# Patient Record
Sex: FEMALE | Race: WHITE | NOT HISPANIC OR LATINO | Employment: UNEMPLOYED
[De-identification: names, ages, dates, MRNs, and addresses within clinical notes are randomized per-mention and may not be internally consistent; named-entity substitution may affect disease eponyms.]

---

## 2017-07-01 ENCOUNTER — HEALTH MAINTENANCE LETTER (OUTPATIENT)
Age: 57
End: 2017-07-01

## 2017-10-06 DIAGNOSIS — J45.909 ASTHMA: Primary | ICD-10-CM

## 2017-12-01 NOTE — TELEPHONE ENCOUNTER
APPT INFO    Date /Time: 12/04/17   Reason for Appt: Asthma   Ref Provider/Clinic: Self   Are there internal records? If yes, list: No   Patient Contact (Y/N) & Call Details: Yes  New issue; hasn't been seen anywhere for this before.  Hasn't been seen by any doctor for over a year.   Action: New Issue; no related records to be gathered.  No chest imaging or PFT has been done before; never been seen by pulm specialist.     OUTSIDE RECORDS CHECKLIST     CLINIC NAME COMMENTS REC (x) IMG (x)

## 2017-12-04 ENCOUNTER — HOSPITAL ENCOUNTER (OUTPATIENT)
Dept: CARDIOLOGY | Facility: CLINIC | Age: 57
Discharge: HOME | End: 2017-12-04
Attending: INTERNAL MEDICINE | Admitting: INTERNAL MEDICINE
Payer: MEDICARE

## 2017-12-04 ENCOUNTER — PRE VISIT (OUTPATIENT)
Dept: PULMONOLOGY | Facility: CLINIC | Age: 57
End: 2017-12-04

## 2017-12-04 ENCOUNTER — OFFICE VISIT (OUTPATIENT)
Dept: PULMONOLOGY | Facility: CLINIC | Age: 57
End: 2017-12-04
Attending: INTERNAL MEDICINE
Payer: MEDICARE

## 2017-12-04 VITALS
DIASTOLIC BLOOD PRESSURE: 81 MMHG | SYSTOLIC BLOOD PRESSURE: 111 MMHG | BODY MASS INDEX: 27.31 KG/M2 | OXYGEN SATURATION: 94 % | HEIGHT: 64 IN | RESPIRATION RATE: 18 BRPM | WEIGHT: 160 LBS | HEART RATE: 97 BPM

## 2017-12-04 DIAGNOSIS — J45.909 ASTHMA: Primary | ICD-10-CM

## 2017-12-04 DIAGNOSIS — R06.09 DYSPNEA ON EXERTION: ICD-10-CM

## 2017-12-04 DIAGNOSIS — R06.09 DYSPNEA ON EXERTION: Primary | ICD-10-CM

## 2017-12-04 LAB
BASOPHILS # BLD AUTO: 0.1 10E9/L (ref 0–0.2)
BASOPHILS NFR BLD AUTO: 0.7 %
DIFFERENTIAL METHOD BLD: NORMAL
EOSINOPHIL # BLD AUTO: 0.1 10E9/L (ref 0–0.7)
EOSINOPHIL NFR BLD AUTO: 0.7 %
ERYTHROCYTE [DISTWIDTH] IN BLOOD BY AUTOMATED COUNT: 13.1 % (ref 10–15)
HCT VFR BLD AUTO: 42.5 % (ref 35–47)
HGB BLD-MCNC: 13.8 G/DL (ref 11.7–15.7)
IMM GRANULOCYTES # BLD: 0 10E9/L (ref 0–0.4)
IMM GRANULOCYTES NFR BLD: 0.2 %
LYMPHOCYTES # BLD AUTO: 3.8 10E9/L (ref 0.8–5.3)
LYMPHOCYTES NFR BLD AUTO: 43.8 %
MCH RBC QN AUTO: 32.2 PG (ref 26.5–33)
MCHC RBC AUTO-ENTMCNC: 32.5 G/DL (ref 31.5–36.5)
MCV RBC AUTO: 99 FL (ref 78–100)
MONOCYTES # BLD AUTO: 0.6 10E9/L (ref 0–1.3)
MONOCYTES NFR BLD AUTO: 6.5 %
NEUTROPHILS # BLD AUTO: 4.1 10E9/L (ref 1.6–8.3)
NEUTROPHILS NFR BLD AUTO: 48.1 %
NRBC # BLD AUTO: 0 10*3/UL
NRBC BLD AUTO-RTO: 0 /100
PLATELET # BLD AUTO: 267 10E9/L (ref 150–450)
RBC # BLD AUTO: 4.28 10E12/L (ref 3.8–5.2)
WBC # BLD AUTO: 8.6 10E9/L (ref 4–11)

## 2017-12-04 PROCEDURE — 99212 OFFICE O/P EST SF 10 MIN: CPT | Mod: ZF

## 2017-12-04 PROCEDURE — 93005 ELECTROCARDIOGRAM TRACING: CPT

## 2017-12-04 PROCEDURE — 93306 TTE W/DOPPLER COMPLETE: CPT

## 2017-12-04 PROCEDURE — 82785 ASSAY OF IGE: CPT | Performed by: INTERNAL MEDICINE

## 2017-12-04 PROCEDURE — 36415 COLL VENOUS BLD VENIPUNCTURE: CPT | Performed by: INTERNAL MEDICINE

## 2017-12-04 PROCEDURE — 85025 COMPLETE CBC W/AUTO DIFF WBC: CPT | Performed by: INTERNAL MEDICINE

## 2017-12-04 PROCEDURE — 93306 TTE W/DOPPLER COMPLETE: CPT | Mod: 26 | Performed by: INTERNAL MEDICINE

## 2017-12-04 RX ORDER — DESONIDE 0.5 MG/G
CREAM TOPICAL 2 TIMES DAILY
COMMUNITY

## 2017-12-04 ASSESSMENT — PAIN SCALES - GENERAL: PAINLEVEL: NO PAIN (0)

## 2017-12-04 NOTE — LETTER
12/4/2017       RE: Dalton Kumar  [address]     [place] [address]     Dear Colleague,    Thank you for referring your patient, Dalton Kumar, to the [facility] at [facility]. Please see a copy of my visit note below.    CHIEF COMPLAINT:  Shortness of breath.      HISTORY OF PRESENT ILLNESS:  The patient is a 57-year-old woman here for evaluation of shortness of breath.  She says it has been present or worse for about the last 2 years.  She says that she notices it most prominently when she picks up her cat or when she is going to the [facility].  She says that her shortness of breath is noted as a sensation of breathing hard.  She says that symptoms occur occasionally, but very consistently with the above-mentioned activities.  She says that she feels tired, and that her breathing is somewhat difficult, but she would not describe it as wheezing or tight.  She says that whenever she has the sensation, she tries to stay calm and relax, and the sensation goes away after a few minutes.  She says that she is not having any problems waking up at night short of breath.  She does have heartburn, but does not necessarily have chest pain.  She does not have any problems with dry eyes or dry mouth.  She does say that her eyes have been watering more frequently, especially in the winter and she attributes the watering to allergies.  She does not have any problems with joint issues. She has noted ankle swelling in her left ankle.  She says she tripped a few years ago, and has had increased in size in that left ankle since then.  She has also noticed a lump on the medial portion of her right gastroc.  She says that this has been present for about 2 months.  It is somewhat tender when she presses on it.  She has not had any injury to that leg.  She has not had any problem with cough, although she does have problems with a dry  nose.  No muscle weakness.  She has no chest pain or palpitations.  She has never had a heart evaluation.  She recently changed her primary care to the [facility] with [facility].  She had an evaluation last approximately 1 year ago.  She says that although she has been complaining of shortness of breath, this is the first time she has seen a specialist for it.  She does have a previous diagnosis of asthma from a previous primary care provider.  She has albuterol inhaler that she uses occasionally.  She has never tried using her albuterol inhaler for these current episodes of shortness of breath. She says that she used to use the albuterol inhaler before going for a 1 mile walk around her block, but hasn't done that type of walking for several years. She did walk a half a mile to [employer] this weekend.      PAST MEDICAL HISTORY:   1.  Hypothyroidism from previous lithium.   2.  Retinitis pigmentosa causing blindness.   3.  Bipolar disorder.   4.  Chronic kidney disease III from lithium toxicity.   5.  Hyperlipidemia.   6.  Hypertension.   7.  History of subdural hematoma.      FAMILY HISTORY:  Paternal aunt had emphysema, but she was a smoker.  Father  of an MI at age 41.      SOCIAL HISTORY:  She is  and has 4 daughters.  She has approximately a 15-pack-year smoking history, quit in .  She reports significant life stressors including a daughter who is going through a divorce, her ex- is in [employer], and a grandson with behavioral issues.      REVIEW OF SYSTEMS:  A full 14-point review of systems was done and is negative, except as noted above and in the HPI.      PHYSICAL EXAMINATION:   VITAL SIGNS:  Blood pressure 111/81, pulse is 97, respiratory rate is 18, SpO2 is 94% on room air.  BMI is 27.46.   GENERAL APPEARANCE:  Appears stated age, no distress.   EYES:  No conjunctival injection.  Left upward gaze deviation.   HEENT:  Nasal mucosa is mildly erythematous, otherwise within normal  limits.   MOUTH:  Oral mucosa is moist.  No posterior oropharyngeal erythema or exudate.   NECK:  Supple with no masses.   LYMPHATICS:  No cervical or supraclavicular lymphadenopathy.   RESPIRATORY:  Good air movement bilaterally.  No wheezes, rales or rhonchi.   CARDIOVASCULAR:  Regular rate and rhythm, normal S1, S2, no murmurs, rubs or gallops.  JVP not appreciated with the patient sitting upright at 90 degrees, and no lower extremity edema is noted.   MUSCULOSKELETAL:  No clubbing or cyanosis.   SKIN:  No rash on limited exam.   NEUROLOGIC:  Mentation appears intact, and speech is fluent.   PSYCHIATRIC:  Affect appears appropriate.      RESULTS:  Pulmonary function testing from today shows normal spirometry, lung volumes and DLCO.      Chest x-ray per my evaluation shows no acute cardiopulmonary abnormality.      LABORATORY:  Ordered today include CBC showing a hemoglobin of 13.      ASSESSMENT AND PLAN:  The patient is a 57-year-old woman here for intermittent shortness of breath.     1.  Intermittent shortness of breath.  The patient reports an intermittent history of shortness of breath.  She does carry a diagnosis of asthma; however, her symptoms are not clearly consistent with asthma or wheeze.  Symptoms are most prominent when bending over to  her cat, although they do occur sporadically.  At this point, I do not see any abnormalities on her pulmonary function testing that would clearly explain her shortness of breath.  Her chest x-ray is normal by my review, and her pulmonary function testing is normal.  I would consider cardiac causes of shortness of breath, and to that end I did request an EKG and an echocardiogram.  I would be looking for any structural heart disease or issues with elevated right-sided pressures that could contribute to shortness of breath.  We talked a little bit about alternative etiologies for shortness of breath, including neuromuscular disease.  She does not have anything  on my review of systems that is suggestive of neuromuscular disease or muscular weakness.  She does have a history of hypothyroidism, but has been well controlled as of late, and I think this is unlikely to be contributing.  She has had anemia in the past, however CBC today shows a normal hemoglobin.  Therefore, I think this is less likely to be contributing to her symptoms.  We did discuss her current workup, as well as what has been done in the past.  I think that she can be reasonably reassured that if no acute abnormalities are found, that there is no concerning cause for her shortness of breath.  I would, therefore, consider obesity and deconditioning as a potential cause for her issues with shortness of breath, and I would recommend a daily regimen of walking 5-10 minutes to start with a goal of increasing length/distance of walking.   I encouraged her to get back into a more active lifestyle as a potential way to improve her breathing.  I will have her come back and see me on an as-needed basis in the future.         JOSÉ NEGRON MD             D: 2017 16:01   T: 2017 19:13   MT: SILVIA      Name:     JUAN ALBERTO ALEJANDRA   MRN:      [id number]        Account:      [id number]   :      1960           Service Date: 2017      Document: [id number]

## 2017-12-04 NOTE — MR AVS SNAPSHOT
After Visit Summary   12/4/2017    Dalton Kumar    MRN: [id number]           Patient Information     Date Of Birth          1960        Visit Information        Provider Department      12/4/2017 2:00 PM Ame Mendoza MD [facility]        Today's Diagnoses     Dyspnea on exertion    -  1      Care Instructions    It was nice to meet you today.    Your lung function testing is normal today. If you have an inhaler, you can consider trying to use it with shortness of breath to see if it helps.     I would like to have an evaluation of your heart (EKG and Echo) to make sure your heart is working well.    I think it would be reasonable to check a couple of labs today as well.    If all of your testing is normal, then I would suspect that your breathing issues are related to deconditioning (also known as lack of exercise).    Ame Mendoza           Follow-ups after your visit        Follow-up notes from your care team     Return if symptoms worsen or fail to improve.      Your next 10 appointments already scheduled     Dec 04, 2017  3:15 PM CST   Lab with [facility] ([facility])    [address] [address] [phone]            Dec 04, 2017  3:30 PM CST   Ech Complete with KAYE   [facility] [place],  [facility] ([facility] [place])    [address] [address] [phone]           1. Please bring or wear a comfortable two-piece outfit. 2. You may eat, drink and take your normal medicines. 3. For any questions that cannot be answered, please contact the ordering physician              Future tests that were ordered for you today     Open Future Orders        Priority Expected Expires Ordered    CBC with platelets differential Routine 12/4/2017 1/3/2018 12/4/2017    Echocardiogram Complete Routine  12/4/2018  "12/4/2017    EKG 12-lead complete with read (future) Routine  12/4/2018 12/4/2017    IgE Routine 12/4/2017 12/4/2018 12/4/2017            Who to contact     If you have questions or need follow up information about today's clinic visit or your schedule please contact [facility] directly at [phone].  Normal or non-critical lab and imaging results will be communicated to you by [employer], letter or phone within 4 business days after the clinic has received the results. If you do not hear from us within 7 days, please contact the clinic through [employer] or phone. If you have a critical or abnormal lab result, we will notify you by phone as soon as possible.  Submit refill requests through [employer] or call your pharmacy and they will forward the refill request to us. Please allow 3 business days for your refill to be completed.          Additional Information About Your Visit        [employer] Information     [employer] gives you secure access to your electronic health record. If you see a primary care provider, you can also send messages to your care team and make appointments. If you have questions, please call your primary care clinic.  If you do not have a primary care provider, please call [phone] and they will assist you.        Care EveryWhere ID     This is your Care EveryWhere ID. This could be used by other organizations to access your [place] medical records  [id number]        Your Vitals Were     Pulse Respirations Height Pulse Oximetry BMI (Body Mass Index)       97 18 1.626 m (5' 4\") 94% 27.46 kg/m2        Blood Pressure from Last 3 Encounters:   12/04/17 111/81   09/21/15 111/76   03/20/08 122/80    Weight from Last 3 Encounters:   12/04/17 72.6 kg (160 lb)   09/21/15 79.3 kg (174 lb 12.8 oz)   03/20/08 66.2 kg (146 lb)                 Today's Medication Changes          These changes are accurate as of: 12/4/17  3:01 PM.  If you have any questions, ask your nurse or " doctor.               Stop taking these medicines if you haven't already. Please contact your care team if you have questions.     oxybutynin 5 MG tablet   Commonly known as:  DITROPAN   Stopped by:  Ame Mendoza MD                    Primary Care Provider Office Phone # Fax #    Roya Holland [phone]       [facility] [address] [address]        Equal Access to Services     [facility] : Hadii aad ku hadasho Soomaali, waaxda luqadaha, qaybta kaalmada adeegyada, waxay idiin hayaan adeeg kharash la'marcelo . So [facility] [phone].    ATENCIÓN: Si mary espjanina, tiene a villela disposición servicios gratuitos de asistencia lingüística. [facility] [phone].    We comply with applicable federal civil rights laws and [place] laws. We do not discriminate on the basis of race, color, national origin, age, disability, sex, sexual orientation, or gender identity.            Thank you!     Thank you for choosing [facility]  for your care. Our goal is always to provide you with excellent care. Hearing back from our patients is one way we can continue to improve our services. Please take a few minutes to complete the written survey that you may receive in the mail after your visit with us. Thank you!             Your Updated Medication List - Protect others around you: Learn how to safely use, store and throw away your medicines at www.disposemymeds.org.          This list is accurate as of: 12/4/17  3:01 PM.  Always use your most recent med list.                   Brand Name Dispense Instructions for use Diagnosis    acetaminophen 500 MG tablet    TYLENOL     Take 1,000 mg by mouth        aspirin 81 MG tablet      Take 81 mg by mouth        atorvastatin 20 MG tablet    LIPITOR     Take 20 mg by mouth        BENADRYL PO      Take 25 mg by mouth daily as needed        buPROPion 150 MG 24 hr tablet    WELLBUTRIN XL     Take 150 mg by  mouth        cholecalciferol 1000 UNIT tablet    vitamin D3     TAKE 1 TABLET BY MOUTH DAILY        DEPAKOTE PO           desonide 0.05 % cream    DESOWEN     Apply topically 2 times daily        eucerin cream           furosemide 20 MG tablet    LASIX     Take 20 mg by mouth        INVEGA 6 MG 24 hr tablet   Generic drug:  paliperidone      Take 6 mg by mouth        levothyroxine 100 MCG tablet    SYNTHROID/LEVOTHROID     1 TABLET DAILY        pimecrolimus 1 % cream    ELIDEL          PROAIR  (90 BASE) MCG/ACT Inhaler   Generic drug:  albuterol      INHALE 2 PUFFS BY MOUTH EVERY 6 HOURS AS NEEDED FOR WHEEZING        topiramate 100 MG tablet    TOPAMAX     Take 100 mg by mouth        zolpidem 5 MG tablet    AMBIEN     Take 5 mg by mouth

## 2017-12-04 NOTE — PATIENT INSTRUCTIONS
It was nice to meet you today.    Your lung function testing is normal today. If you have an inhaler, you can consider trying to use it with shortness of breath to see if it helps.     I would like to have an evaluation of your heart (EKG and Echo) to make sure your heart is working well.    I think it would be reasonable to check a couple of labs today as well.    If all of your testing is normal, then I would suspect that your breathing issues are related to deconditioning (also known as lack of exercise).    Ame Mendoza

## 2017-12-04 NOTE — NURSING NOTE
Chief Complaint   Patient presents with     Consult     New consult on Dalton and her Asthma     Ihsan Staton CMA at 2:00 PM on 12/4/2017

## 2017-12-05 LAB — INTERPRETATION ECG - MUSE: NORMAL

## 2017-12-05 NOTE — PROGRESS NOTES
CHIEF COMPLAINT:  Shortness of breath.      HISTORY OF PRESENT ILLNESS:  The patient is a 57-year-old woman here for evaluation of shortness of breath.  She says it has been present or worse for about the last 2 years.  She says that she notices it most prominently when she picks up her cat or when she is going to the [employer] [place].  She says that her shortness of breath is noted as a sensation of breathing hard.  She says that symptoms occur occasionally, but very consistently with the above-mentioned activities.  She says that she feels tired, and that her breathing is somewhat difficult, but she would not describe it as wheezing or tight.  She says that whenever she has the sensation, she tries to stay calm and relax, and the sensation goes away after a few minutes.  She says that she is not having any problems waking up at night short of breath.  She does have heartburn, but does not necessarily have chest pain.  She does not have any problems with dry eyes or dry mouth.  She does say that her eyes have been watering more frequently, especially in the winter and she attributes the watering to allergies.  She does not have any problems with joint issues. She has noted ankle swelling in her left ankle.  She says she tripped a few years ago, and has had increased in size in that left ankle since then.  She has also noticed a lump on the medial portion of her right gastroc.  She says that this has been present for about 2 months.  It is somewhat tender when she presses on it.  She has not had any injury to that leg.  She has not had any problem with cough, although she does have problems with a dry nose.  No muscle weakness.  She has no chest pain or palpitations.  She has never had a heart evaluation.  She recently changed her primary care to the [facility] with [facility].  She had an evaluation last approximately 1 year ago.  She says that although she has been complaining of shortness of breath, this  is the first time she has seen a specialist for it.  She does have a previous diagnosis of asthma from a previous primary care provider.  She has albuterol inhaler that she uses occasionally.  She has never tried using her albuterol inhaler for these current episodes of shortness of breath. She says that she used to use the albuterol inhaler before going for a 1 mile walk around her block, but hasn't done that type of walking for several years. She did walk a half a mile to [employer] this weekend.      PAST MEDICAL HISTORY:   1.  Hypothyroidism from previous lithium.   2.  Retinitis pigmentosa causing blindness.   3.  Bipolar disorder.   4.  Chronic kidney disease III from lithium toxicity.   5.  Hyperlipidemia.   6.  Hypertension.   7.  History of subdural hematoma.      FAMILY HISTORY:  Paternal aunt had emphysema, but she was a smoker.  Father  of an MI at age 41.      SOCIAL HISTORY:  She is  and has 4 daughters.  She has approximately a 15-pack-year smoking history, quit in .  She reports significant life stressors including a daughter who is going through a divorce, her ex- is in [employer], and a grandson with behavioral issues.      REVIEW OF SYSTEMS:  A full 14-point review of systems was done and is negative, except as noted above and in the HPI.      PHYSICAL EXAMINATION:   VITAL SIGNS:  Blood pressure 111/81, pulse is 97, respiratory rate is 18, SpO2 is 94% on room air.  BMI is 27.46.   GENERAL APPEARANCE:  Appears stated age, no distress.   EYES:  No conjunctival injection.  Left upward gaze deviation.   HEENT:  Nasal mucosa is mildly erythematous, otherwise within normal limits.   MOUTH:  Oral mucosa is moist.  No posterior oropharyngeal erythema or exudate.   NECK:  Supple with no masses.   LYMPHATICS:  No cervical or supraclavicular lymphadenopathy.   RESPIRATORY:  Good air movement bilaterally.  No wheezes, rales or rhonchi.   CARDIOVASCULAR:  Regular rate and rhythm, normal S1,  S2, no murmurs, rubs or gallops.  JVP not appreciated with the patient sitting upright at 90 degrees, and no lower extremity edema is noted.   MUSCULOSKELETAL:  No clubbing or cyanosis.   SKIN:  No rash on limited exam.   NEUROLOGIC:  Mentation appears intact, and speech is fluent.   PSYCHIATRIC:  Affect appears appropriate.      RESULTS:  Pulmonary function testing from today shows normal spirometry, lung volumes and DLCO.      Chest x-ray per my evaluation shows no acute cardiopulmonary abnormality.      LABORATORY:  Ordered today include CBC showing a hemoglobin of 13.      ASSESSMENT AND PLAN:  The patient is a 57-year-old woman here for intermittent shortness of breath.     1.  Intermittent shortness of breath.  The patient reports an intermittent history of shortness of breath.  She does carry a diagnosis of asthma; however, her symptoms are not clearly consistent with asthma or wheeze.  Symptoms are most prominent when bending over to  her cat, although they do occur sporadically.  At this point, I do not see any abnormalities on her pulmonary function testing that would clearly explain her shortness of breath.  Her chest x-ray is normal by my review, and her pulmonary function testing is normal.  I would consider cardiac causes of shortness of breath, and to that end I did request an EKG and an echocardiogram.  I would be looking for any structural heart disease or issues with elevated right-sided pressures that could contribute to shortness of breath.  We talked a little bit about alternative etiologies for shortness of breath, including neuromuscular disease.  She does not have anything on my review of systems that is suggestive of neuromuscular disease or muscular weakness.  She does have a history of hypothyroidism, but has been well controlled as of late, and I think this is unlikely to be contributing.  She has had anemia in the past, however CBC today shows a normal hemoglobin.  Therefore, I  think this is less likely to be contributing to her symptoms.  We did discuss her current workup, as well as what has been done in the past.  I think that she can be reasonably reassured that if no acute abnormalities are found, that there is no concerning cause for her shortness of breath.  I would, therefore, consider obesity and deconditioning as a potential cause for her issues with shortness of breath, and I would recommend a daily regimen of walking 5-10 minutes to start with a goal of increasing length/distance of walking.   I encouraged her to get back into a more active lifestyle as a potential way to improve her breathing.  I will have her come back and see me on an as-needed basis in the future.         JOSÉ NEGRON MD             D: 2017 16:01   T: 2017 19:13   MT: SILVIA      Name:     JUAN ALBERTO ALEJANDRA   MRN:      [id number]        Account:      [id number]   :      1960           Service Date: 2017      Document: [id number]

## 2017-12-06 LAB — IGE SERPL-ACNC: 72 KIU/L (ref 0–114)

## 2017-12-12 LAB
DLCOUNC-%PRED-PRE: 80 %
DLCOUNC-PRE: 17.92 ML/MIN/MMHG
DLCOUNC-PRED: 22.16 ML/MIN/MMHG
ERV-%PRED-PRE: 29 %
ERV-PRE: 0.23 L
ERV-PRED: 0.78 L
EXPTIME-PRE: 6.74 SEC
FEF2575-%PRED-PRE: 79 %
FEF2575-PRE: 1.97 L/SEC
FEF2575-PRED: 2.49 L/SEC
FEFMAX-%PRED-PRE: 65 %
FEFMAX-PRE: 4.25 L/SEC
FEFMAX-PRED: 6.53 L/SEC
FEV1-%PRED-PRE: 78 %
FEV1-PRE: 2.07 L
FEV1FEV6-PRE: 79 %
FEV1FEV6-PRED: 81 %
FEV1FVC-PRE: 79 %
FEV1FVC-PRED: 80 %
FEV1SVC-PRE: 79 %
FEV1SVC-PRED: 78 %
FIFMAX-PRE: 4.6 L/SEC
FRCPLETH-%PRED-PRE: 94 %
FRCPLETH-PRE: 2.54 L
FRCPLETH-PRED: 2.7 L
FVC-%PRED-PRE: 79 %
FVC-PRE: 2.62 L
FVC-PRED: 3.31 L
IC-%PRED-PRE: 86 %
IC-PRE: 2.24 L
IC-PRED: 2.59 L
RVPLETH-%PRED-PRE: 117 %
RVPLETH-PRE: 2.16 L
RVPLETH-PRED: 1.83 L
TLCPLETH-%PRED-PRE: 96 %
TLCPLETH-PRE: 4.78 L
TLCPLETH-PRED: 4.94 L
VA-%PRED-PRE: 76 %
VA-PRE: 3.87 L
VC-%PRED-PRE: 77 %
VC-PRE: 2.63 L
VC-PRED: 3.37 L

## 2017-12-27 ENCOUNTER — TRANSFERRED RECORDS (OUTPATIENT)
Dept: HEALTH INFORMATION MANAGEMENT | Facility: CLINIC | Age: 57
End: 2017-12-27

## 2017-12-27 LAB
HPV ABSTRACT: NORMAL
PAP-ABSTRACT: NORMAL

## 2018-02-26 ENCOUNTER — OFFICE VISIT (OUTPATIENT)
Dept: OPHTHALMOLOGY | Facility: CLINIC | Age: 58
End: 2018-02-26
Attending: OPHTHALMOLOGY
Payer: MEDICARE

## 2018-02-26 DIAGNOSIS — H35.52 PIGMENTARY RETINAL DYSTROPHY: Primary | ICD-10-CM

## 2018-02-26 DIAGNOSIS — H54.3 BLIND IN BOTH EYES: ICD-10-CM

## 2018-02-26 PROCEDURE — G0463 HOSPITAL OUTPT CLINIC VISIT: HCPCS | Performed by: OPHTHALMOLOGY

## 2018-02-26 PROCEDURE — 92134 CPTRZ OPH DX IMG PST SGM RTA: CPT | Mod: ZF | Performed by: OPHTHALMOLOGY

## 2018-02-26 PROCEDURE — 92250 FUNDUS PHOTOGRAPHY W/I&R: CPT | Mod: ZF | Performed by: OPHTHALMOLOGY

## 2018-02-26 ASSESSMENT — CONF VISUAL FIELD
OS_SUPERIOR_NASAL_RESTRICTION: 1
OD_INFERIOR_NASAL_RESTRICTION: 3
OS_INFERIOR_NASAL_RESTRICTION: 3
OD_SUPERIOR_NASAL_RESTRICTION: 1
OS_SUPERIOR_TEMPORAL_RESTRICTION: 1
OD_SUPERIOR_TEMPORAL_RESTRICTION: 1
OS_INFERIOR_TEMPORAL_RESTRICTION: 3
OD_INFERIOR_TEMPORAL_RESTRICTION: 3

## 2018-02-26 ASSESSMENT — SLIT LAMP EXAM - LIDS
COMMENTS: NORMAL
COMMENTS: NORMAL

## 2018-02-26 ASSESSMENT — VISUAL ACUITY
OD_SC: HM
METHOD: SNELLEN - LINEAR
OS_SC: HM

## 2018-02-26 ASSESSMENT — TONOMETRY
IOP_METHOD: ICARE
OD_IOP_MMHG: 11
OS_IOP_MMHG: 13

## 2018-02-26 ASSESSMENT — EXTERNAL EXAM - LEFT EYE: OS_EXAM: NORMAL

## 2018-02-26 ASSESSMENT — CUP TO DISC RATIO
OD_RATIO: 0.3
OS_RATIO: 0.3

## 2018-02-26 ASSESSMENT — EXTERNAL EXAM - RIGHT EYE: OD_EXAM: NORMAL

## 2018-02-26 NOTE — NURSING NOTE
Chief Complaints and History of Present Illnesses   Patient presents with     Retinal Evaluation     possible Stargardt's disease     HPI    Affected eye(s):  Both   Symptoms:     Tearing (Comment: BE)         Do you have eye pain now?:  No      Comments:  Pt states vision has gotten worse from last visit since 2010  [place] get a [place] in [place] more when tired  Kira Mcdermott February 26, 2018 1:48 PM  I have reviewed all information that was taken. Brenda MARIA 2:02 PM February 26, 2018

## 2018-02-26 NOTE — PROGRESS NOTES
I have confirmed the patient's and reviewed Past Medical History, Past Surgical History, Social History, Family History, Problem List, Medication List and agree with Tech note.      CC: Enrollment in My Ret[facility] Tr[facility]    HPI  Patient is a 57 year old female last seen in 2010 by Dr. Levin. 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 Reports that she had genetic testing through [facility] through [facility] which was negative. Reports that over the past 4 year has had worsening vision in both eyes. Denies eye pain, redness, new flashes/floaters.      Assessment and Plan:    1. Retinal dystrophy, bilateral:   - Previously diagnosed with Stargardt's and appears similar to early onset Stargardt's   - Negative genetic testing in the past with [facility]   - Patient would like to be enrolled in My Retina Tracker for further genetic testing   - Enroll in My Retina Tracker through [facility] [employer]   - Referral to genetics clinic for genetic testing    Return to clinic in 6 months for results review.  patient gave permission to share photos with colleagues abroad.      Donald Sandra MD  Ophthalmology, PGY-3    ATTESTATION:  I have seen and examined the patient with Dr. Sandra and agree with the findings in this note, as well as the interpretations of the diagnostic tests.     Navjot Bowman MD PhD.  Professor & Chair

## 2018-02-26 NOTE — MR AVS SNAPSHOT
After Visit Summary   2/26/2018    Dalton Kumar    MRN: [id number]           Patient Information     Date Of Birth          1960        Visit Information        Provider Department      2/26/2018 1:30 PM Navjot [facility]        Today's Diagnoses     Pigmentary retinal dystrophy    -  1    Blind in both eyes           Follow-ups after your visit        Follow-up notes from your care team     Return in about 6 months (around 8/26/2018) for retinal dystrophy.      Who to contact     Please call your clinic at [phone] to:    Ask questions about your health    Make or cancel appointments    Discuss your medicines    Learn about your test results    Speak to your doctor            Additional Information About Your Visit        Elo[employer] Information     MEDEM gives you secure access to your electronic health record. If you see a primary care provider, you can also send messages to your care team and make appointments. If you have questions, please call your primary care clinic.  If you do not have a primary care provider, please call [phone] and they will assist you.      MEDEM is an electronic gateway that provides easy, online access to your medical records. With MEDEM, you can request a clinic appointment, read your test results, renew a prescription or communicate with your care team.     To access your existing account, please contact your [facility] or call [phone] for assistance.        Care EveryWhere ID     This is your Care EveryWhere ID. This could be used by other organizations to access your [place] medical records  [id number]         Blood Pressure from Last 3 Encounters:   12/04/17 111/81   09/21/15 111/76   03/20/08 122/80    Weight from Last 3 Encounters:   12/04/17 72.6 kg (160 lb)   09/21/15 79.3 kg (174 lb 12.8 oz)   03/20/08 66.2 kg (146 lb)              We Performed the Following     Fundus  Autofluorescence Image (FAF) OU (both eyes)     Fundus Photos OU (both eyes)     OCT Retina Spectralis OU (both eyes)        Primary Care Provider Office Phone # Fax #    Roya Holland [phone]       [facility] [address] [facility] [address]        Equal Access to Services     JARVIS YOUNG : Hadii aad ku hadasho Soomaali, waaxda luqadaha, qaybta kaalmada adeegyada, neeru zuniga haykelvinn adenancy foleyshaynajosemanuel osorio. So [facility] [phone].    ATENCIÓN: Si habla español, tiene a villela disposición servicios gratuitos de asistencia lingüística. [facility] [phone].    We comply with applicable federal civil rights laws and [place] laws. We do not discriminate on the basis of race, color, national origin, age, disability, sex, sexual orientation, or gender identity.            Thank you!     Thank you for choosing [facility]  for your care. Our goal is always to provide you with excellent care. Hearing back from our patients is one way we can continue to improve our services. Please take a few minutes to complete the written survey that you may receive in the mail after your visit with us. Thank you!             Your Updated Medication List - Protect others around you: Learn how to safely use, store and throw away your medicines at www.disposemymeds.org.          This list is accurate as of 2/26/18  4:56 PM.  Always use your most recent med list.                   Brand Name Dispense Instructions for use Diagnosis    acetaminophen 500 MG tablet    TYLENOL     Take 1,000 mg by mouth        aspirin 81 MG tablet      Take 81 mg by mouth        atorvastatin 20 MG tablet    LIPITOR     Take 20 mg by mouth        BENADRYL PO      Take 25 mg by mouth daily as needed        buPROPion 150 MG 24 hr tablet    WELLBUTRIN XL     Take 150 mg by mouth        cholecalciferol 1000 UNIT tablet    vitamin D3     TAKE 1 TABLET BY MOUTH DAILY        DEPAKOTE PO           desonide 0.05 % cream    DESOWEN      Apply topically 2 times daily        eucerin cream           furosemide 20 MG tablet    LASIX     Take 20 mg by mouth        INVEGA 6 MG 24 hr tablet   Generic drug:  paliperidone      Take 6 mg by mouth        levothyroxine 100 MCG tablet    SYNTHROID/LEVOTHROID     1 TABLET DAILY        pimecrolimus 1 % cream    ELIDEL          PROAIR  (90 BASE) MCG/ACT Inhaler   Generic drug:  albuterol      INHALE 2 PUFFS BY MOUTH EVERY 6 HOURS AS NEEDED FOR WHEEZING        topiramate 100 MG tablet    TOPAMAX     Take 100 mg by mouth        zolpidem 5 MG tablet    AMBIEN     Take 5 mg by mouth

## 2018-06-03 ENCOUNTER — OFFICE VISIT (OUTPATIENT)
Dept: URGENT CARE | Facility: URGENT CARE | Age: 58
End: 2018-06-03
Payer: MEDICARE

## 2018-06-03 VITALS
HEART RATE: 97 BPM | OXYGEN SATURATION: 96 % | DIASTOLIC BLOOD PRESSURE: 88 MMHG | TEMPERATURE: 97.1 F | WEIGHT: 171 LBS | BODY MASS INDEX: 29.35 KG/M2 | SYSTOLIC BLOOD PRESSURE: 128 MMHG

## 2018-06-03 DIAGNOSIS — R19.7 DIARRHEA, UNSPECIFIED TYPE: Primary | ICD-10-CM

## 2018-06-03 PROCEDURE — 99213 OFFICE O/P EST LOW 20 MIN: CPT | Performed by: PHYSICIAN ASSISTANT

## 2018-06-03 NOTE — PROGRESS NOTES
"SUBJECTIVE:   Dalton Kumar is a 57 year old female presenting for evaluation of   Chief Complaint   Patient presents with     Urgent Care     Pt in clinic to have eval for diarrhea for 6-8 weeks.     Diarrhea     She has had 3 episodes of nonbloody diarrhea daily for the last 6-8 weeks. She describes diarrhea as \"solid kind of.\" It is loose, however. Her episodes occur in the morning. No black stools. No abdominal pain, nausea, vomiting. She is eating well. She has tried several different diets, probiotics, stopping diary, and nothing seems to help. She does drink a little bit of caffeine in the morning. She has not been on antibiotics recently. No recent travel. No known contacts with diarrheal illness. She had been taking Zantac in the beginning of the course of the diarrhea for heartburn. But, then the diarrhea started and she has since stopped. The heartburn is gone.  No fever. No dysuria, hematuria. No cough, runny nose. No rashes.    [facility]    ROS  See HPI otherwise negative    PMH:  Past Medical History:   Diagnosis Date     Bipolar 1 disorder (H)      Hyperlipidemia      Hypertension      Hypothyroidism (acquired)      Lithium toxicity      Retinitis pigmentosa      Subdural hematoma (H)      Patient Active Problem List    Diagnosis Date Noted     Fatigue 08/14/2014     Priority: Medium     Shortness of breath 08/14/2014     Priority: Medium     Adenomatous polyp of colon 06/12/2014     Priority: Medium     Adjustment disorder with anxiety 02/13/2014     Priority: Medium     Blind in both eyes 07/22/2013     Priority: Medium     Eczema 10/11/2012     Priority: Medium     Weight gain 09/24/2012     Priority: Medium     Overview:   Weight gain due to Depakote and risperidone       Overactive bladder 07/24/2012     Priority: Medium     SDH (subdural hematoma) (H) 06/15/2012     Priority: Medium     Hyperlipidemia with target LDL less than " 100 08/15/2011     Priority: Medium     Overview:   Formatting of this note may be different from the original.  Pt tolerating 20 mg Atorvastatin  LDL, CALC. (mg/dl)   Date Value   7/8/2013 186*     ICD 10       Left leg pain 05/31/2011     Priority: Medium     Visual hallucinations 05/20/2011     Priority: Medium     Overview:   Visual hallucinations, Selvin Bonnet syndrome       Abnormal Pap smear of cervix 04/01/2010     Priority: Medium     Overview:   LSIL 11/12/09 >colposcopy>3/23/10 no dysplasia  2011 ASCUS HPV negative  2013 NILM Negative HPV  2016 LSIL, hpv negative 56 y.o.  PLAN: .cotest 12/2017    ; Pap test history       Hypothyroidism 11/12/2009     Priority: Medium     Overview:   Hypothyroidism On Replacement       Benign hypertensive kidney disease with chronic kidney disease stage I through stage IV, or unspecified(403.10) 12/19/2008     Priority: Medium     Bipolar affective disorder, depressed, in full remission (H) 12/19/2008     Priority: Medium     Overview:    is Rick Horan [phone]       Chronic kidney disease, stage III (moderate) 12/19/2008     Priority: Medium     Overview:   Chronic Kidney Disease, Stage III (Moderate) from Lithium Nephropathy and HTN Renal Disease       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     Priority: Medium     Bipolar I disorder (H) 07/30/2008     Priority: Medium     Overview:   Bipolar I Dis  NOS       Pigmentary retinal dystrophy 07/30/2008     Priority: Medium     Overview:   LW Modifier:  blind  ; Retinitis Pigmentosa           Current medications:  Current Outpatient Prescriptions   Medication Sig Dispense Refill     acetaminophen (TYLENOL) 500 MG tablet Take 1,000 mg by mouth       albuterol (ALBUTEROL) 108 (90 BASE) MCG/ACT inhaler INHALE 2 PUFFS BY MOUTH EVERY 6 HOURS AS NEEDED FOR WHEEZING       aspirin 81 MG tablet Take 81 mg by mouth       atorvastatin (LIPITOR) 20 MG tablet Take 20 mg by mouth       buPROPion (WELLBUTRIN XL) 150 MG 24 hr  tablet Take 150 mg by mouth       cholecalciferol (VITAMIN D3) 1000 UNIT tablet TAKE 1 TABLET BY MOUTH DAILY       desonide (DESOWEN) 0.05 % cream Apply topically 2 times daily       DiphenhydrAMINE HCl (BENADRYL PO) Take 25 mg by mouth daily as needed       Divalproex Sodium (DEPAKOTE PO)        furosemide (LASIX) 20 MG tablet Take 20 mg by mouth       LEVOTHYROXINE SODIUM 100 MCG OR TABS 1 TABLET DAILY       paliperidone (INVEGA) 6 MG 24 hr tablet Take 6 mg by mouth       pimecrolimus (ELIDEL) 1 % cream        Skin Protectants, Misc. (EUCERIN) cream        topiramate (TOPAMAX) 100 MG tablet Take 100 mg by mouth       zolpidem (AMBIEN) 5 MG tablet Take 5 mg by mouth         Surgical History:  No past surgical history on file.    Family history:  Family History   Problem Relation Age of Onset     Coronary Artery Disease Early Onset Father      Emphysema Paternal Aunt      Glaucoma No family hx of      Macular Degeneration No family hx of          Social History:  Social History   Substance Use Topics     Smoking status: Former Smoker     Packs/day: 0.50     Years: 7.00     Quit date: 12/4/2001     Smokeless tobacco: Never Used     Alcohol use Not on file         OBJECTIVE  /88  Pulse 97  Temp 97.1  F (36.2  C) (Tympanic)  Wt 171 lb (77.6 kg)  SpO2 96%  BMI 29.35 kg/m2    Physical Exam  General: alert, appears well, NAD. Afebrile.  Skin: no jaundice  Respiratory: No distress. Equal inspiration to bilateral bases. No crackles wheeze, rhonchi, rales.   Cardiovascular: RRR. No murmurs, clicks, gallups, or rub.   Gastrointestinal: Abdomen soft, nontender, BS present throughout. No masses, organomegaly.  Neurologic: Follows commands. Gait: walks with cane  Psychiatric: mentation appears normal and affect normal/bright           Labs:  No results found for this or any previous visit (from the past 24 hour(s)).          ASSESSMENT:      ICD-10-CM    1. Diarrhea, unspecified type R19.7 Enteric Bacteria and Virus  Panel by ANTHONY Stool        Medical Decision Making:    Patient presents to  with chronic diarrhea x 6-8 weeks. Nonbloody, no reports of abdominal pain. Appears well hydrated and is taking plenty of PO intake. Nothing to suggest acute cause today with this duration, lack of progression/worsening.    Differential Diagnosis:  -infectious- stool cultures pending  -parasitic- less likely, did not test for O&P today but could be done in future if no other clear etiology  -microscopic colitis or more overt colitis- less likely without acute ill appearance, bloody stools, or other red flags on history.  -thyroid dysfunction- possible. Defer further testing to patient's PCP.    Serious Comorbid Conditions: CKD stage III        PLAN:  Await stool cultures  Continue probiotic  Push electrolyte-containing fluids    Follow up with your Primary Care Provider within 1-2 weeks for further evaluation.    Return precautions provided below in patient instructions.  Patient understood and agreed to plan. Patient was appropriate for discharge.      There are no Patient Instructions on file for this visit.          Phoebe Ryan PA-C  06/03/18 4:26 PM

## 2018-06-03 NOTE — MR AVS SNAPSHOT
After Visit Summary   6/3/2018    Dalton Kumar    MRN: [id number]           Patient Information     Date Of Birth          1960        Visit Information        Provider Department      6/3/2018 3:10 PM Phoebe Ryan PA-C [facility]        Today's Diagnoses     Diarrhea, unspecified type    -  1       Follow-ups after your visit        Follow-up notes from your care team     Return in about 1 week (around 6/10/2018).      Future tests that were ordered for you today     Open Future Orders        Priority Expected Expires Ordered    Enteric Bacteria and Virus Panel by ANTHONY Stool Routine  7/3/2018 6/3/2018            Who to contact     If you have questions or need follow up information about today's clinic visit or your schedule please contact [facility] directly at [phone].  Normal or non-critical lab and imaging results will be communicated to you by MyChart, letter or phone within 4 business days after the clinic has received the results. If you do not hear from us within 7 days, please contact the clinic through [employer] or phone. If you have a critical or abnormal lab result, we will notify you by phone as soon as possible.  Submit refill requests through [employer] or call your pharmacy and they will forward the refill request to us. Please allow 3 business days for your refill to be completed.          Additional Information About Your Visit        MyChart Information     [employer] gives you secure access to your electronic health record. If you see a primary care provider, you can also send messages to your care team and make appointments. If you have questions, please call your primary care clinic.  If you do not have a primary care provider, please call [phone] and they will assist you.        Care EveryWhere ID     This is your Care EveryWhere ID. This could be used by other organizations to access your [facility]  records  [id number]        Your Vitals Were     Pulse Temperature Pulse Oximetry BMI (Body Mass Index)          97 97.1  F (36.2  C) (Tympanic) 96% 29.35 kg/m2         Blood Pressure from Last 3 Encounters:   06/03/18 128/88   12/04/17 111/81   09/21/15 111/76    Weight from Last 3 Encounters:   06/03/18 171 lb (77.6 kg)   12/04/17 160 lb (72.6 kg)   09/21/15 174 lb 12.8 oz (79.3 kg)               Primary Care Provider Office Phone # Fax #    Roya Holland [phone]       [place] [id number] [facility] [address]        Equal Access to Services     JARVIS YOUNG : Sheri martin Sokennedy, waaxda luqadaha, qaybta kaalmada adenancyyakarson, neeru osorio. So [facility] [phone].    ATENCIÓN: Si habla español, tiene a villela disposición servicios gratuitos de asistencia lingüística. [facility] [phone].    We comply with applicable federal civil rights laws and [place] laws. We do not discriminate on the basis of race, color, national origin, age, disability, sex, sexual orientation, or gender identity.            Thank you!     Thank you for choosing [facility]  for your care. Our goal is always to provide you with excellent care. Hearing back from our patients is one way we can continue to improve our services. Please take a few minutes to complete the written survey that you may receive in the mail after your visit with us. Thank you!             Your Updated Medication List - Protect others around you: Learn how to safely use, store and throw away your medicines at www.disposemymeds.org.          This list is accurate as of 6/3/18  4:27 PM.  Always use your most recent med list.                   Brand Name Dispense Instructions for use Diagnosis    acetaminophen 500 MG tablet    TYLENOL     Take 1,000 mg by mouth        aspirin 81 MG tablet      Take 81 mg by mouth        atorvastatin 20 MG tablet    LIPITOR     Take 20 mg  by mouth        BENADRYL PO      Take 25 mg by mouth daily as needed        buPROPion 150 MG 24 hr tablet    WELLBUTRIN XL     Take 150 mg by mouth        cholecalciferol 1000 UNIT tablet    vitamin D3     TAKE 1 TABLET BY MOUTH DAILY        DEPAKOTE PO           desonide 0.05 % cream    DESOWEN     Apply topically 2 times daily        eucerin cream           furosemide 20 MG tablet    LASIX     Take 20 mg by mouth        INVEGA 6 MG 24 hr tablet   Generic drug:  paliperidone      Take 6 mg by mouth        levothyroxine 100 MCG tablet    SYNTHROID/LEVOTHROID     1 TABLET DAILY        pimecrolimus 1 % cream    ELIDEL          PROAIR  (90 Base) MCG/ACT Inhaler   Generic drug:  albuterol      INHALE 2 PUFFS BY MOUTH EVERY 6 HOURS AS NEEDED FOR WHEEZING        topiramate 100 MG tablet    TOPAMAX     Take 100 mg by mouth        zolpidem 5 MG tablet    AMBIEN     Take 5 mg by mouth

## 2018-06-05 DIAGNOSIS — R19.7 DIARRHEA, UNSPECIFIED TYPE: ICD-10-CM

## 2018-06-05 PROCEDURE — 87506 IADNA-DNA/RNA PROBE TQ 6-11: CPT | Performed by: PHYSICIAN ASSISTANT

## 2018-06-07 ENCOUNTER — TELEPHONE (OUTPATIENT)
Dept: GASTROENTEROLOGY | Facility: CLINIC | Age: 58
End: 2018-06-07

## 2018-06-07 NOTE — TELEPHONE ENCOUNTER
[facility] Call Center    Phone Message    May a detailed message be left on voicemail: yes    Reason for Call: Symptoms or Concerns     If patient has red-flag symptoms, warm transfer to triage line    Current symptom or concern: Diarrhea - Per pt, getting worse would like to be seen in GI as self referral. Stated all records are within the [facility] system, has not gone anywhere else. Please review records and follow up with patient. Informed patient self-referral process can be lengthy.    Symptoms have been present for:  A few weeks    Has patient previously been seen for this? Yes    By PCP    Are there any new or worsening symptoms? Yes: Diarrhea      Action Taken: Message routed to:  [facility]

## 2018-06-08 NOTE — TELEPHONE ENCOUNTER
Returned patient call. Reviewed chart- patient seen in urgent care on 6/3/18 for diarrhea. Stools studies completed and recommended patient follow up with PCP in 1-2 weeks.     Reviewed with patient and states she is scheduled to see PCP already.     No further GI review needed at this time.

## 2018-10-01 NOTE — TELEPHONE ENCOUNTER
FUTURE VISIT INFORMATION      FUTURE VISIT INFORMATION:    Date: 10-9-18    Time:     Location:   REFERRAL INFORMATION:    Referring provider:  self    Referring providers clinic:      Reason for visit/diagnosis  hearing loss     RECORDS REQUESTED FROM:       Clinic name Comments Records Status Imaging Status   All records internal                                       RECORDS STATUS

## 2018-10-02 DIAGNOSIS — H91.90 HEARING LOSS: Primary | ICD-10-CM

## 2018-10-09 ENCOUNTER — PRE VISIT (OUTPATIENT)
Dept: OTOLARYNGOLOGY | Facility: CLINIC | Age: 58
End: 2018-10-09

## 2019-05-22 ENCOUNTER — TELEPHONE (OUTPATIENT)
Dept: OPHTHALMOLOGY | Facility: CLINIC | Age: 59
End: 2019-05-22

## 2019-05-22 NOTE — TELEPHONE ENCOUNTER
Note to facilitator to assist in review of retina tracker inquiries  Ihsan Quiroz RN 3:23 PM 05/22/19        [facility]    Phone Message    May a detailed message be left on voicemail: yes    Reason for Call: Other: per pts friend krishna, has questions for care team about my retina tracker program that Dr.Van brito is enrolled in, please call pts friend krishna thanks!!     Action Taken: Message routed to:  [facility] eye

## 2019-06-10 ENCOUNTER — OFFICE VISIT (OUTPATIENT)
Dept: OPHTHALMOLOGY | Facility: CLINIC | Age: 59
End: 2019-06-10
Attending: OPHTHALMOLOGY
Payer: MEDICARE

## 2019-06-10 ENCOUNTER — TRANSFERRED RECORDS (OUTPATIENT)
Dept: HEALTH INFORMATION MANAGEMENT | Facility: CLINIC | Age: 59
End: 2019-06-10

## 2019-06-10 ENCOUNTER — MEDICAL CORRESPONDENCE (OUTPATIENT)
Dept: HEALTH INFORMATION MANAGEMENT | Facility: CLINIC | Age: 59
End: 2019-06-10

## 2019-06-10 DIAGNOSIS — H35.53 STARGARDT'S DISEASE: Primary | ICD-10-CM

## 2019-06-10 PROCEDURE — G0463 HOSPITAL OUTPT CLINIC VISIT: HCPCS | Mod: ZF

## 2019-06-10 ASSESSMENT — CONF VISUAL FIELD
OD_SUPERIOR_TEMPORAL_RESTRICTION: 1
OS_INFERIOR_NASAL_RESTRICTION: 1
OD_SUPERIOR_NASAL_RESTRICTION: 1
OS_SUPERIOR_TEMPORAL_RESTRICTION: 1
OD_INFERIOR_NASAL_RESTRICTION: 1
OS_SUPERIOR_NASAL_RESTRICTION: 1
OS_INFERIOR_TEMPORAL_RESTRICTION: 1
OD_INFERIOR_TEMPORAL_RESTRICTION: 1

## 2019-06-10 ASSESSMENT — VISUAL ACUITY
METHOD: SNELLEN - LINEAR
OS_SC: HM
OD_SC: HM

## 2019-06-10 ASSESSMENT — TONOMETRY
OS_IOP_MMHG: 15
OD_IOP_MMHG: 17
IOP_METHOD: TONOPEN

## 2019-06-10 NOTE — NURSING NOTE
Chief Complaints and History of Present Illnesses   Patient presents with     Retinal Dystrophy Hereditary Follow Up     Chief Complaint(s) and History of Present Illness(es)     Retinal Dystrophy Hereditary Follow Up     Laterality: both eyes    Associated symptoms: Negative for eye pain    Onset: 1 year ago    Associated symptoms: Negative for eye pain, flashes, floaters and photophobia    Pain scale: 0/10              Comments     Pt here for f/u retinal dystrophy - pt reports the vision and comfort of eyes about the same    Hedy VALENCIA 1:12 PM Franci 10, 2019

## 2019-06-10 NOTE — PROGRESS NOTES
I have confirmed the patient's and reviewed Past Medical History, Past Surgical History, Social History, Family History, Problem List, Medication List and agree with Tech note.    CC: blurry vision both eyes     HPI: pt last seen 2 years ago, wants to proceed with genetic testing.    Assessment/plan:   1.  Patient is registered with [facility].  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  Proceed with B retinal tracker       RTC as needed     Navjot Bowman MD PhD.  Professor & Chair

## 2019-09-20 ENCOUNTER — TRANSFERRED RECORDS (OUTPATIENT)
Dept: HEALTH INFORMATION MANAGEMENT | Facility: CLINIC | Age: 59
End: 2019-09-20

## 2019-09-30 ENCOUNTER — TRANSFERRED RECORDS (OUTPATIENT)
Dept: HEALTH INFORMATION MANAGEMENT | Facility: CLINIC | Age: 59
End: 2019-09-30

## 2019-10-03 ENCOUNTER — HEALTH MAINTENANCE LETTER (OUTPATIENT)
Age: 59
End: 2019-10-03

## 2020-02-08 ENCOUNTER — HEALTH MAINTENANCE LETTER (OUTPATIENT)
Age: 60
End: 2020-02-08

## 2020-11-07 ENCOUNTER — HEALTH MAINTENANCE LETTER (OUTPATIENT)
Age: 60
End: 2020-11-07

## 2021-03-21 ENCOUNTER — HEALTH MAINTENANCE LETTER (OUTPATIENT)
Age: 61
End: 2021-03-21

## 2021-05-07 ENCOUNTER — OFFICE VISIT (OUTPATIENT)
Dept: URGENT CARE | Facility: URGENT CARE | Age: 61
End: 2021-05-07
Payer: MEDICARE

## 2021-05-07 VITALS
DIASTOLIC BLOOD PRESSURE: 81 MMHG | HEART RATE: 90 BPM | OXYGEN SATURATION: 99 % | SYSTOLIC BLOOD PRESSURE: 137 MMHG | BODY MASS INDEX: 29.35 KG/M2 | WEIGHT: 171 LBS | TEMPERATURE: 97.9 F

## 2021-05-07 DIAGNOSIS — R60.0 BILATERAL LOWER EXTREMITY EDEMA: Primary | ICD-10-CM

## 2021-05-07 LAB
BASOPHILS # BLD AUTO: 0 10E9/L (ref 0–0.2)
BASOPHILS NFR BLD AUTO: 0.4 %
DIFFERENTIAL METHOD BLD: NORMAL
EOSINOPHIL # BLD AUTO: 0.1 10E9/L (ref 0–0.7)
EOSINOPHIL NFR BLD AUTO: 0.6 %
ERYTHROCYTE [DISTWIDTH] IN BLOOD BY AUTOMATED COUNT: 13.3 % (ref 10–15)
HCT VFR BLD AUTO: 40.6 % (ref 35–47)
HGB BLD-MCNC: 13.3 G/DL (ref 11.7–15.7)
LYMPHOCYTES # BLD AUTO: 4.7 10E9/L (ref 0.8–5.3)
LYMPHOCYTES NFR BLD AUTO: 48.2 %
MCH RBC QN AUTO: 31.2 PG (ref 26.5–33)
MCHC RBC AUTO-ENTMCNC: 32.8 G/DL (ref 31.5–36.5)
MCV RBC AUTO: 95 FL (ref 78–100)
MONOCYTES # BLD AUTO: 0.6 10E9/L (ref 0–1.3)
MONOCYTES NFR BLD AUTO: 6 %
NEUTROPHILS # BLD AUTO: 4.4 10E9/L (ref 1.6–8.3)
NEUTROPHILS NFR BLD AUTO: 44.8 %
PLATELET # BLD AUTO: 283 10E9/L (ref 150–450)
RBC # BLD AUTO: 4.26 10E12/L (ref 3.8–5.2)
WBC # BLD AUTO: 9.7 10E9/L (ref 4–11)

## 2021-05-07 PROCEDURE — 99203 OFFICE O/P NEW LOW 30 MIN: CPT | Performed by: NURSE PRACTITIONER

## 2021-05-07 PROCEDURE — 36415 COLL VENOUS BLD VENIPUNCTURE: CPT | Performed by: NURSE PRACTITIONER

## 2021-05-07 PROCEDURE — 80053 COMPREHEN METABOLIC PANEL: CPT | Performed by: NURSE PRACTITIONER

## 2021-05-07 PROCEDURE — 85025 COMPLETE CBC W/AUTO DIFF WBC: CPT | Performed by: NURSE PRACTITIONER

## 2021-05-07 RX ORDER — DIPHENHYDRAMINE HCL 25 MG
50 CAPSULE ORAL
COMMUNITY
Start: 2021-03-30 | End: 2024-07-09

## 2021-05-07 RX ORDER — HYDROCHLOROTHIAZIDE 12.5 MG/1
12.5 CAPSULE ORAL
COMMUNITY
End: 2021-08-06

## 2021-05-07 RX ORDER — ATORVASTATIN CALCIUM 80 MG/1
80 TABLET, FILM COATED ORAL DAILY
COMMUNITY
Start: 2021-03-08 | End: 2021-08-06

## 2021-05-07 RX ORDER — BUPROPION HYDROCHLORIDE 150 MG/1
150 TABLET ORAL
COMMUNITY
Start: 2021-03-30

## 2021-05-07 RX ORDER — LEVOTHYROXINE SODIUM 125 UG/1
TABLET ORAL
COMMUNITY
Start: 2021-04-27 | End: 2022-03-24

## 2021-05-07 RX ORDER — DIVALPROEX SODIUM 500 MG/1
500 TABLET, DELAYED RELEASE ORAL 2 TIMES DAILY
COMMUNITY
Start: 2021-03-30

## 2021-05-07 ASSESSMENT — ENCOUNTER SYMPTOMS
FEVER: 0
PARESTHESIAS: 0
APPETITE CHANGE: 0
PALPITATIONS: 0
FATIGUE: 0
ABDOMINAL PAIN: 0
NAUSEA: 0
CHEST TIGHTNESS: 0
SHORTNESS OF BREATH: 0
COLOR CHANGE: 0
WHEEZING: 0
DIAPHORESIS: 0
CHILLS: 0
NUMBNESS: 0
SLEEP DISTURBANCE: 0
COUGH: 0
VOMITING: 0
ACTIVITY CHANGE: 0
ARTHRALGIAS: 0
MYALGIAS: 0
WEAKNESS: 0

## 2021-05-07 NOTE — PATIENT INSTRUCTIONS
Discussed multiple etiologies of lower extremity edema including CKD, salt, sedentary lifestyle, CHF  CBC and CMP pending, will call for abnormals in 1-2 days  Rest, walk, elevate, compression stalkings  Low likelihood of DVTs based off well's criteria  Needs primary care follow-up / establish for chronic condition management  ER if severe calf pain, shortness of breath, bloody sputum    Patient Education     Leg Swelling in Both Legs    Swelling of the feet, ankles, and legs is called edema. It is caused by excess fluid that has collected in the tissues. Extra fluid in the body settles in the lowest part because of gravity. This is why the legs and feet are most affected.  Some of the causes for edema include:    Disease of the heart such as congestive heart failure    Standing or sitting for long periods of time    Infection of the feet or legs    Blood pooling in the veins of your legs (venous insufficiency) when the veins have less elasticity    Dilated veins in your lower leg (varicose veins)    Stockings or other clothing that is tight on your legs. This will cause blood to pool in your legs because the clothing limits blood flow.    Some medicines. These include some hormones such as birth control pills, some blood pressure medicines such as calcium channel blockers, steroids, and some antidepressants such as MAO inhibitors and tricyclics.    Menstrual periods that cause you to retain fluids    Many types of kidney disease    Liver failure or cirrhosis    Pregnancy. Some swelling is normal, but a sudden increase in leg swelling or weight gain can be a sign of a dangerous complication of pregnancy called eclampsia.    Poor nutrition    Thyroid disease  Treatment will depend on what is causing the swelling in your legs. Your healthcare provider may prescribe water pills (diuretics) to get rid of the extra fluid.  Home care  Follow these guidelines when caring for yourself at home:    Don't wear clothing such as  stockings that are tight on your legs.    Keep your legs up while lying or sitting.    If infection, injury, or recent surgery is causing the swelling, stay off your legs as much as possible until symptoms get better.    If your healthcare provider says that your leg swelling is caused by venous insufficiency or varicose veins, don't sit or  one place for long periods of time. Take breaks and walk about every few hours. Brisk walking is a good exercise. It helps circulate the blood that has collected in your leg. Talk with your provider about using support stockings to stop daytime leg swelling.    If your provider says that heart disease is causing your leg swelling, follow a low-salt diet to stop extra fluid from staying in your body. You may also need medicine.  Follow-up care  Follow up with your healthcare provider, or as advised.  When to seek medical advice  Call your healthcare provider right away if any of these occur:    Swelling in both legs or ankles that gets worse    Swelling of the abdomen    Redness, warmth, or swelling in one leg    Fever of 100.4 F (38 C) or higher , or as directed by your healthcare provider    Yellow color to your skin or eyes    Rapid, unexplained weight gain    Having to sleep upright or use an increased number of pillow     Call 911  This is the fastest and safest way to get to the emergency department. The paramedics can also start treatment on the way to the hospital, if needed.  Call 911, or seekmedical attention right away if    You have new shortness of breath or chest pain    Worsening shortness of breath or chest pain?  [employer] last reviewed this educational content on 11/1/2019 2000-2021 [employer]. All rights reserved. This information is not intended as a substitute for professional medical care. Always follow your healthcare professional's instructions.

## 2021-05-07 NOTE — PROGRESS NOTES
Chief Complaint   Patient presents with     Urgent Care     Ankle Problem     c/o ankle swollen , no injury      SUBJECTIVE:  Dalton Kumar is a 60 year old female who presents to the clinic today with her daughter for bilateral lower extremity edema for weeks. She has had this before and was on lasix years ago. Has known CKD. Needs a new primary care provider since it is hard for her to get into hers. Got the [employer] COVID-19 vaccine last month. Denies recent medication changes, shortness of breath, chest pain, palpitations, coughing, redness, warmth, prior DVT.    Past Medical History:   Diagnosis Date     Bipolar 1 disorder (H)      Hyperlipidemia      Hypertension      Hypothyroidism (acquired)      Lithium toxicity      Retinitis pigmentosa      Subdural hematoma (H)      atorvastatin (LIPITOR) 80 MG tablet, Take 80 mg by mouth daily  buPROPion (WELLBUTRIN XL) 150 MG 24 hr tablet, Take 150 mg by mouth  diphenhydrAMINE (BENADRYL) 25 MG capsule, Take 50 mg by mouth  divalproex sodium delayed-release (DEPAKOTE) 500 MG DR tablet, Take 500 mg by mouth 2 times daily  hydrochlorothiazide (MICROZIDE) 12.5 MG capsule, Take 12.5 mg by mouth  levothyroxine (SYNTHROID/LEVOTHROID) 125 MCG tablet,   acetaminophen (TYLENOL) 500 MG tablet, Take 1,000 mg by mouth  albuterol (ALBUTEROL) 108 (90 BASE) MCG/ACT inhaler, INHALE 2 PUFFS BY MOUTH EVERY 6 HOURS AS NEEDED FOR WHEEZING  aspirin 81 MG tablet, Take 81 mg by mouth  atorvastatin (LIPITOR) 20 MG tablet, Take 20 mg by mouth  buPROPion (WELLBUTRIN XL) 150 MG 24 hr tablet, Take 150 mg by mouth  cholecalciferol (VITAMIN D3) 1000 UNIT tablet, TAKE 1 TABLET BY MOUTH DAILY  desonide (DESOWEN) 0.05 % cream, Apply topically 2 times daily  DiphenhydrAMINE HCl (BENADRYL PO), Take 25 mg by mouth daily as needed  Divalproex Sodium (DEPAKOTE PO),   furosemide (LASIX) 20 MG tablet, Take 20 mg by mouth  LEVOTHYROXINE SODIUM 100 MCG OR TABS, 1 TABLET DAILY  paliperidone  (INVEGA) 6 MG 24 hr tablet, Take 6 mg by mouth  pimecrolimus (ELIDEL) 1 % cream,   Skin Protectants, Misc. (EUCERIN) cream,   topiramate (TOPAMAX) 100 MG tablet, Take 100 mg by mouth  zolpidem (AMBIEN) 5 MG tablet, Take 5 mg by mouth    No current facility-administered medications on file prior to visit.     Social History     Tobacco Use     Smoking status: Former Smoker     Packs/day: 0.50     Years: 7.00     Pack years: 3.50     Quit date: 2001     Years since quittin.4     Smokeless tobacco: Never Used   Substance Use Topics     Alcohol use: Not on file     Allergies   Allergen Reactions     Fumaric Acid Itching     Haloperidol Swelling     dystonia possibly     Hydromorphone Unknown     Morphine Unknown     No Clinical Screening - See Comments Itching     Oxycodone-Acetaminophen Unknown     Penicillins Other (See Comments) and Unknown     Strawberry Hives and Unknown     Review of Systems   Constitutional: Negative for activity change, appetite change, chills, diaphoresis, fatigue and fever.   Respiratory: Negative for cough, chest tightness, shortness of breath and wheezing.    Cardiovascular: Negative for chest pain, palpitations and peripheral edema.   Gastrointestinal: Negative for abdominal pain, nausea and vomiting.   Musculoskeletal: Negative for arthralgias and myalgias.        Lower extremity edema.   Skin: Negative for color change and rash.   Neurological: Negative for weakness, numbness and paresthesias.   Psychiatric/Behavioral: Negative for sleep disturbance.     EXAM:   /81   Pulse 90   Temp 97.9  F (36.6  C) (Tympanic)   Wt 77.6 kg (171 lb)   SpO2 99%   BMI 29.35 kg/m      Physical Exam  Vitals signs reviewed.   Constitutional:       General: She is not in acute distress.     Appearance: Normal appearance. She is not ill-appearing, toxic-appearing or diaphoretic.   HENT:      Head: Normocephalic and atraumatic.   Cardiovascular:      Rate and Rhythm: Normal rate.    Pulmonary:      Effort: Pulmonary effort is normal.   Musculoskeletal: Normal range of motion.         General: Swelling present. No tenderness or signs of injury.      Right lower leg: Edema present.      Left lower leg: Edema present.      Comments: Mild lower extremity nonpitting nontender edema, no varicosities, erythema, warmth.   Skin:     General: Skin is warm and dry.      Coloration: Skin is not pale.      Findings: No bruising, erythema or rash.   Neurological:      General: No focal deficit present.      Mental Status: She is alert and oriented to person, place, and time.   Psychiatric:         Mood and Affect: Mood normal.         Behavior: Behavior normal.       ASSESSMENT:    ICD-10-CM    1. Bilateral lower extremity edema  R60.0 CBC with platelets and differential     Comprehensive metabolic panel (BMP + Alb, Alk Phos, ALT, AST, Total. Bili, TP)     PLAN:  Patient Instructions   Discussed multiple etiologies of lower extremity edema including CKD, salt, sedentary lifestyle, CHF  CBC and CMP pending, will call for abnormals in 1-2 days  Rest, walk, elevate, compression stalkings  Low likelihood of DVTs based off well's criteria  Needs primary care follow-up / establish for chronic condition management  ER if severe calf pain, shortness of breath, bloody sputum    Patient Education     Leg Swelling in Both Legs    Swelling of the feet, ankles, and legs is called edema. It is caused by excess fluid that has collected in the tissues. Extra fluid in the body settles in the lowest part because of gravity. This is why the legs and feet are most affected.  Some of the causes for edema include:    Disease of the heart such as congestive heart failure    Standing or sitting for long periods of time    Infection of the feet or legs    Blood pooling in the veins of your legs (venous insufficiency) when the veins have less elasticity    Dilated veins in your lower leg (varicose veins)    Stockings or other  clothing that is tight on your legs. This will cause blood to pool in your legs because the clothing limits blood flow.    Some medicines. These include some hormones such as birth control pills, some blood pressure medicines such as calcium channel blockers, steroids, and some antidepressants such as MAO inhibitors and tricyclics.    Menstrual periods that cause you to retain fluids    Many types of kidney disease    Liver failure or cirrhosis    Pregnancy. Some swelling is normal, but a sudden increase in leg swelling or weight gain can be a sign of a dangerous complication of pregnancy called eclampsia.    Poor nutrition    Thyroid disease  Treatment will depend on what is causing the swelling in your legs. Your healthcare provider may prescribe water pills (diuretics) to get rid of the extra fluid.  Home care  Follow these guidelines when caring for yourself at home:    Don't wear clothing such as stockings that are tight on your legs.    Keep your legs up while lying or sitting.    If infection, injury, or recent surgery is causing the swelling, stay off your legs as much as possible until symptoms get better.    If your healthcare provider says that your leg swelling is caused by venous insufficiency or varicose veins, don't sit or  one place for long periods of time. Take breaks and walk about every few hours. Brisk walking is a good exercise. It helps circulate the blood that has collected in your leg. Talk with your provider about using support stockings to stop daytime leg swelling.    If your provider says that heart disease is causing your leg swelling, follow a low-salt diet to stop extra fluid from staying in your body. You may also need medicine.  Follow-up care  Follow up with your healthcare provider, or as advised.  When to seek medical advice  Call your healthcare provider right away if any of these occur:    Swelling in both legs or ankles that gets worse    Swelling of the  abdomen    Redness, warmth, or swelling in one leg    Fever of 100.4 F (38 C) or higher , or as directed by your healthcare provider    Yellow color to your skin or eyes    Rapid, unexplained weight gain    Having to sleep upright or use an increased number of pillow     Call 911  This is the fastest and safest way to get to the emergency department. The paramedics can also start treatment on the way to the hospital, if needed.  Call 911, or seekmedical attention right away if    You have new shortness of breath or chest pain    Worsening shortness of breath or chest pain?  [employer] last reviewed this educational content on 11/1/2019 2000-2021 [employer]. All rights reserved. This information is not intended as a substitute for professional medical care. Always follow your healthcare professional's instructions.             Follow up with primary care provider with any problems, questions or concerns or if symptoms worsen or fail to improve. Patient agreed to plan and verbalized understanding.    Lata Snyder, CHRIS-BC  [facility]

## 2021-05-08 LAB
ALBUMIN SERPL-MCNC: 4.3 G/DL (ref 3.4–5)
ALP SERPL-CCNC: 65 U/L (ref 40–150)
ALT SERPL W P-5'-P-CCNC: 38 U/L (ref 0–50)
ANION GAP SERPL CALCULATED.3IONS-SCNC: 11 MMOL/L (ref 3–14)
AST SERPL W P-5'-P-CCNC: 23 U/L (ref 0–45)
BILIRUB SERPL-MCNC: 0.2 MG/DL (ref 0.2–1.3)
BUN SERPL-MCNC: 27 MG/DL (ref 7–30)
CALCIUM SERPL-MCNC: 9.6 MG/DL (ref 8.5–10.1)
CHLORIDE SERPL-SCNC: 107 MMOL/L (ref 94–109)
CO2 SERPL-SCNC: 17 MMOL/L (ref 20–32)
CREAT SERPL-MCNC: 0.98 MG/DL (ref 0.52–1.04)
GFR SERPL CREATININE-BSD FRML MDRD: 63 ML/MIN/{1.73_M2}
GLUCOSE SERPL-MCNC: 106 MG/DL (ref 70–99)
POTASSIUM SERPL-SCNC: 4.2 MMOL/L (ref 3.4–5.3)
PROT SERPL-MCNC: 8.1 G/DL (ref 6.8–8.8)
SODIUM SERPL-SCNC: 136 MMOL/L (ref 133–144)

## 2021-05-10 ENCOUNTER — TELEPHONE (OUTPATIENT)
Dept: FAMILY MEDICINE | Facility: CLINIC | Age: 61
End: 2021-05-10

## 2021-05-10 NOTE — TELEPHONE ENCOUNTER
Providers-Please review and advise.    Call received from patient:  1. What are lab results from 5/7/21?  2. Is Urgent Care provider going to prescribe Lasix?    Pharmacy confirmed with patient.    5/7/21 lab results reviewed with patient.    Thank you!  ALIN CarreraN, RN  [facility]

## 2021-05-11 NOTE — TELEPHONE ENCOUNTER
Pt was called and message was left requesting return call to clinic for lab results.  Anita Hinson MA        Pt returned call and lab results were given.  Pt was advised to f/u with her PCP for Lasix request.  Pt understands and will call clinic with any questions or concerns.  Anita Hinson MA

## 2021-05-21 ENCOUNTER — OFFICE VISIT (OUTPATIENT)
Dept: FAMILY MEDICINE | Facility: CLINIC | Age: 61
End: 2021-05-21
Payer: MEDICARE

## 2021-05-21 VITALS
BODY MASS INDEX: 29.52 KG/M2 | HEART RATE: 73 BPM | WEIGHT: 172 LBS | OXYGEN SATURATION: 98 % | RESPIRATION RATE: 16 BRPM | TEMPERATURE: 97.2 F | DIASTOLIC BLOOD PRESSURE: 74 MMHG | SYSTOLIC BLOOD PRESSURE: 126 MMHG

## 2021-05-21 DIAGNOSIS — E78.5 HYPERLIPIDEMIA WITH TARGET LDL LESS THAN 100: ICD-10-CM

## 2021-05-21 DIAGNOSIS — M25.472 ANKLE EDEMA, BILATERAL: ICD-10-CM

## 2021-05-21 DIAGNOSIS — M25.471 ANKLE EDEMA, BILATERAL: ICD-10-CM

## 2021-05-21 DIAGNOSIS — F31.9 BIPOLAR I DISORDER (H): ICD-10-CM

## 2021-05-21 DIAGNOSIS — I12.9 BENIGN HYPERTENSIVE KIDNEY DISEASE WITH CHRONIC KIDNEY DISEASE STAGE I THROUGH STAGE IV, OR UNSPECIFIED(403.10): ICD-10-CM

## 2021-05-21 DIAGNOSIS — R63.5 WEIGHT GAIN: ICD-10-CM

## 2021-05-21 DIAGNOSIS — H35.53 STARGARDT'S DISEASE: ICD-10-CM

## 2021-05-21 DIAGNOSIS — E03.9 HYPOTHYROIDISM, UNSPECIFIED TYPE: Primary | ICD-10-CM

## 2021-05-21 DIAGNOSIS — Z79.899 OTHER LONG TERM (CURRENT) DRUG THERAPY: ICD-10-CM

## 2021-05-21 PROCEDURE — 99214 OFFICE O/P EST MOD 30 MIN: CPT | Performed by: STUDENT IN AN ORGANIZED HEALTH CARE EDUCATION/TRAINING PROGRAM

## 2021-05-21 NOTE — PATIENT INSTRUCTIONS
Schedule lab only.    Return for physical exam.    Work on weight loss -- walk more, eat healthier (fat free yogurt), fruits and vegetables, cut back on carbohydrates.     Monitor leg swelling. Recheck at physical. Use compression socks throughout the day. Work on weight loss. Return right away for any new cough, shortness of breath, chest pain.

## 2021-05-21 NOTE — PROGRESS NOTES
Assessment & Plan     Hypothyroidism, unspecified type  Due for labs. She will return for lab only visit while fasting (needs to come in on a Saturday so she can have a ride from her daughter) and subsequently a physical. It is possible that her peripheral edema could be myxedema from thyroid disease.   - TSH with free T4 reflex    Hyperlipidemia with target LDL less than 100  Due for lipids. Last check showed elevated TGs >600. Return for fasting. She may require TG lowering medication if still persistent. Advised increasing physical activity. She is taking Lipitor.  - Lipid panel reflex to direct LDL Fasting    Weight gain  Due for A1c. Advised activity, monitoring diet. Watch sodium and processed foods.   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     Bipolar I disorder (H)  Due for Depakote levels. Plan to forward on to her psychiatrist for review. Mental health is well controlled.   - Valproic acid    Ankle edema  DDx includes cardiac disease HTN, CHF, hepatic disease, malnutrition, malabsorption, EVERARDO , renal disease , chronic venous insufficiency (obesity), venous obstruction (tumor, lymphatic obstruction), lipedema, lymphedema. CBC and CMP were unremarkable. She does not have symptoms consistent with cardiac disease, sleep apnea. No history of pelvic surgery or malignancy symptoms. Exam findings not consistent with DVT given symmetric distribution, Wells score 0. Of note, she has previously been prescribed hydrochlorothiazide for edema. Will check TSH and monitor symptoms with weight loss. Advised increased walking, use of compression socks in the meantime.     35 minutes spent on the date of the encounter doing chart review,  history and exam, documentation and further activities per the note     Michelle Carpenter MD  [facility]    Karen Hoffman is a 60 year old who presents for the following health issues  accompanied by her daughter:    HPI     Here to establish care. She was previously being seen at [facility].     Recently seen in  for bilateral ankle edema. Tells me she isn't sure if they are actually swollen or just puffy from gaining weight. Noted this about a week ago. Denies calf or ankle pain. Notes 10 pound weight gain in past couple of months she attributes to eating more due to COVID pandemic. Work up in  included normal CBC, CMP. Advised follow up with her PCP. Per chart review, she was placed on hydrochlorothiazide in the past for ankle swelling. Appears this is not a recent medication. She has not had chest pain, palpitations, orthopnea, exercise intolerance, significant shortness of breath, headaches, snoring, daytime fatigue, night time awakening, history of pelvic surgeries. She has chronic mild shortness of breath with activities for past 5 years due to deconditioning, per patient. No recent medication changes. She bought compression socks in early May and has been wearing these about 6 hours a day. Hasn't really noted a difference. She walks a bit but not regularly.     Chronic issues:  Intermittent asthma - well controlled. Uses albuterol as needed. Triggers include exercise, dust, hot weather.   She isn't sure why she takes a baby aspirin but was told to do so in the past.   HLD -  Taking Lipitor. Also taking fish oil. Last labs June 2020 and high. Watches what she eats. Trying to minimize salt but working on this. Doesn't cook. She gets meals delivered. Her daughter (who is present today) also helps out with food prep. Eating low fat dairy. Minimizing sugar.   Bipolar disorder - goes to psychiatry at [facility]. She has bipolar and it is well controlled.  Seeing her psychiatrist twice per year. She doesn't have a therapist and denies needing one. On wellbutrin, Depakote, palpieridone, topiramate. She has labs checked once per year through her PCP.   Insomnia - She takes Ambien and benadryl for sleep. These are prescribed by her psychiatrist.   Hypothyroidism - On Synthroid. Last TSH was at goal June 2020.  Stargadts disease - She is legally blind. Lives independently with assistance from her daughter.         Objective    /74 (BP Location: Left arm, Patient Position: Sitting, Cuff Size: Adult Regular)   Pulse 73   Temp 97.2  F (36.2  C) (Tympanic)   Resp 16   Wt 78 kg (172 lb)   SpO2 98%   BMI 29.52 kg/m    Body mass index is 29.52 kg/m .  Physical Exam   GENERAL: healthy, alert and no distress  RESP: lungs clear to auscultation - no rales, rhonchi or wheezes  CV: regular rate and rhythm, normal S1 S2, no S3 or S4, no murmur, click or rub, no peripheral edema and peripheral pulses strong  ABDOMEN: soft, nontender, no hepatosplenomegaly, no masses and bowel sounds normal  MS: Trace non pitting edema bilateral ankles, non tender, no asymmetry  SKIN: no suspicious lesions or rashes  NEURO: Normal strength and tone, mentation intact and speech normal  PSYCH: mentation appears normal, affect normal/bright    Recent normal CMP, CBC.  Last TSH normal 6/2020.   Lipids also done at that time and noted for TGs 683.   5/2020: A1c 5.6. Depakote was low.

## 2021-05-29 ENCOUNTER — RECORDS - HEALTHEAST (OUTPATIENT)
Dept: ADMINISTRATIVE | Facility: CLINIC | Age: 61
End: 2021-05-29

## 2021-05-29 DIAGNOSIS — Z79.899 OTHER LONG TERM (CURRENT) DRUG THERAPY: ICD-10-CM

## 2021-05-29 DIAGNOSIS — E78.5 HYPERLIPIDEMIA WITH TARGET LDL LESS THAN 100: ICD-10-CM

## 2021-05-29 DIAGNOSIS — R63.5 WEIGHT GAIN: ICD-10-CM

## 2021-05-29 DIAGNOSIS — F31.9 BIPOLAR I DISORDER (H): ICD-10-CM

## 2021-05-29 DIAGNOSIS — E03.9 HYPOTHYROIDISM, UNSPECIFIED TYPE: ICD-10-CM

## 2021-05-29 LAB
CHOLEST SERPL-MCNC: 271 MG/DL
HBA1C MFR BLD: 6 % (ref 0–5.6)
HDLC SERPL-MCNC: 18 MG/DL
LDLC SERPL CALC-MCNC: ABNORMAL MG/DL
NONHDLC SERPL-MCNC: 253 MG/DL
TRIGL SERPL-MCNC: 691 MG/DL
TSH SERPL DL<=0.005 MIU/L-ACNC: 9.52 MU/L (ref 0.4–4)
VALPROATE SERPL-MCNC: 38 MG/L (ref 50–100)

## 2021-05-29 PROCEDURE — 80061 LIPID PANEL: CPT | Performed by: STUDENT IN AN ORGANIZED HEALTH CARE EDUCATION/TRAINING PROGRAM

## 2021-05-29 PROCEDURE — 84439 ASSAY OF FREE THYROXINE: CPT | Performed by: STUDENT IN AN ORGANIZED HEALTH CARE EDUCATION/TRAINING PROGRAM

## 2021-05-29 PROCEDURE — 83721 ASSAY OF BLOOD LIPOPROTEIN: CPT | Mod: 59 | Performed by: STUDENT IN AN ORGANIZED HEALTH CARE EDUCATION/TRAINING PROGRAM

## 2021-05-29 PROCEDURE — 83036 HEMOGLOBIN GLYCOSYLATED A1C: CPT | Performed by: STUDENT IN AN ORGANIZED HEALTH CARE EDUCATION/TRAINING PROGRAM

## 2021-05-29 PROCEDURE — 36415 COLL VENOUS BLD VENIPUNCTURE: CPT | Performed by: STUDENT IN AN ORGANIZED HEALTH CARE EDUCATION/TRAINING PROGRAM

## 2021-05-29 PROCEDURE — 84443 ASSAY THYROID STIM HORMONE: CPT | Performed by: STUDENT IN AN ORGANIZED HEALTH CARE EDUCATION/TRAINING PROGRAM

## 2021-05-29 PROCEDURE — 80164 ASSAY DIPROPYLACETIC ACD TOT: CPT | Performed by: STUDENT IN AN ORGANIZED HEALTH CARE EDUCATION/TRAINING PROGRAM

## 2021-05-29 NOTE — RESULT ENCOUNTER NOTE
Felipe Hoffman,    Your diabetes test is elevated--you have prediabetes. Please work on nutrition, exercise, maintaining healthy weight. Please schedule phone visit if you have questions.    Michelle Carpenter MD  [facility]  [phone]

## 2021-05-30 LAB
LDLC SERPL DIRECT ASSAY-MCNC: 144 MG/DL
T4 FREE SERPL-MCNC: 1.01 NG/DL (ref 0.76–1.46)

## 2021-05-31 NOTE — RESULT ENCOUNTER NOTE
Please confirm patient received my message and help her schedule follow up.    Felipe Hoffman,    Your cholesterol levels are very high. Please schedule a virtual visit with me to discuss your labs further and recommended treatment. We can do this over video, phone, or in person.    Michelle Carpenter MD  [facility]  [phone]

## 2021-06-01 ENCOUNTER — TELEPHONE (OUTPATIENT)
Dept: FAMILY MEDICINE | Facility: CLINIC | Age: 61
End: 2021-06-01

## 2021-06-01 ENCOUNTER — RECORDS - HEALTHEAST (OUTPATIENT)
Dept: ADMINISTRATIVE | Facility: CLINIC | Age: 61
End: 2021-06-01

## 2021-06-01 NOTE — TELEPHONE ENCOUNTER
----- Message from Monica Kam RN sent at 6/1/2021  7:29 AM CDT -----  This was accidentally sent to the care coordination [facility]   Monica Kam RN, Care Coordinator   [facility]   E-mail [email or web address]   [phone]      ----- Message -----  From: Michelle Carpenter MD  Sent: 5/31/2021   3:13 PM CDT  To:  Care Coordinator [place]    Please confirm patient received my message and help her schedule follow up.    Felipe Hoffman,    Your cholesterol levels are very high. Please schedule a virtual visit with me to discuss your labs further and recommended treatment. We can do this over video, phone, or in person.    Michelle Carpenter MD  [facility]  [phone]

## 2021-06-01 NOTE — TELEPHONE ENCOUNTER
Left message to call back and ask to speak with an available triage nurse.  ALIN CarreraN, RN  [facility]

## 2021-06-01 NOTE — TELEPHONE ENCOUNTER
Patient informed and telephone appointment scheduled for 6/10/21.  Patient also has an appointment for her physical 6/25/21.  Rahel Vigil RN  [facility]

## 2021-06-02 ENCOUNTER — RECORDS - HEALTHEAST (OUTPATIENT)
Dept: ADMINISTRATIVE | Facility: CLINIC | Age: 61
End: 2021-06-02

## 2021-06-10 ENCOUNTER — VIRTUAL VISIT (OUTPATIENT)
Dept: FAMILY MEDICINE | Facility: CLINIC | Age: 61
End: 2021-06-10
Payer: MEDICARE

## 2021-06-10 DIAGNOSIS — R63.5 WEIGHT GAIN: ICD-10-CM

## 2021-06-10 DIAGNOSIS — E78.1 HYPERTRIGLYCERIDEMIA: ICD-10-CM

## 2021-06-10 DIAGNOSIS — M54.50 ACUTE LEFT-SIDED LOW BACK PAIN WITHOUT SCIATICA: ICD-10-CM

## 2021-06-10 DIAGNOSIS — E03.9 HYPOTHYROIDISM, UNSPECIFIED TYPE: Primary | ICD-10-CM

## 2021-06-10 DIAGNOSIS — R73.03 PREDIABETES: ICD-10-CM

## 2021-06-10 DIAGNOSIS — E03.9 HYPOTHYROIDISM, UNSPECIFIED TYPE: ICD-10-CM

## 2021-06-10 PROCEDURE — 99442 PR PHYSICIAN TELEPHONE EVALUATION 11-20 MIN: CPT | Mod: 95 | Performed by: STUDENT IN AN ORGANIZED HEALTH CARE EDUCATION/TRAINING PROGRAM

## 2021-06-10 RX ORDER — LEVOTHYROXINE SODIUM 137 UG/1
137 TABLET ORAL DAILY
Qty: 60 TABLET | Refills: 0 | Status: SHIPPED | OUTPATIENT
Start: 2021-06-10 | End: 2022-03-24

## 2021-06-10 RX ORDER — FENOFIBRATE 48 MG/1
48 TABLET, COATED ORAL DAILY
Qty: 60 TABLET | Refills: 0 | Status: SHIPPED | OUTPATIENT
Start: 2021-06-10 | End: 2021-08-06

## 2021-06-10 NOTE — PATIENT INSTRUCTIONS
Switch to the new dose of levothyroxine, which is a slightly higher dose. We will recheck your thyroid studies in 6-8 weeks.     Start fenofibrate. Continue your statin medication. We will recheck your cholesterol fasting in 6-8 weeks. We will also check your liver function and blood count.     Stop fenofibrate if you develop any new abdominal, nausea and/or vomiting.     We can do an annual exam at the same time as your labs.    Please let us know if you have questions.    For your back pain, try Chantell exercises at home. Please be seen immediately for any new fever, leg weakness, loss of bowel or bladder control, or severe pain. Schedule a clinic appointment if you are failing to improve with heat, stretches, warm baths.

## 2021-06-10 NOTE — PROGRESS NOTES
Dalton is a 60 year old who is being evaluated via a billable telephone visit.      What phone number would you like to be contacted at? [phone]  How would you like to obtain your AVS? Mail a copy    Assessment & Plan     Hypothyroidism, unspecified type  Will increase dose of Synthroid slightly due to cholesterol elevation and plan to recheck TSH in 6 weeks.   - levothyroxine (SYNTHROID/LEVOTHROID) 137 MCG tablet  Dispense: 60 tablet; Refill: 0  - TSH with free T4 reflex    Hypertriglyceridemia  Already on Lipitor 80 mg daily, fish oil 1 gram daily. Her TGs are >500. She is working on lifestyle changes. Declines dietician referral at this time. Will start fenofibrate and check labs in 6 weeks--CMP, lipids, CBC, thyroid studies. If she cannot tolerate Tricor,  We could increase dose of fish oil to 4 g daily.   - fenofibrate (TRICOR) 48 MG tablet  Dispense: 60 tablet; Refill: 0  - Comprehensive metabolic panel (BMP + Alb, Alk Phos, ALT, AST, Total. Bili, TP)  - Lipid panel reflex to direct LDL Fasting  - CBC with platelets    Acute left-sided low back pain wit,hout sciatica  Symptoms are consistent with musclar strain. No red flag symptoms. Conservative cares discussed and reasons to be seen were reviewed.     Prediabetes  A1c 6% Plan to recheck in 3-6 months.     Weight gain  Contributing to above issues.       Michelle Carpenter MD  [facility]    Patient Instructions   Switch to the new dose of levothyroxine, which is a slightly higher dose. We will recheck your thyroid studies in 6-8 weeks.     Start fenofibrate. Continue your statin medication. We will recheck your cholesterol fasting in 6-8 weeks. We will also check your liver function and blood count.     Stop fenofibrate if you develop any new abdominal, nausea and/or vomiting.     We can do an annual exam at the same time as your labs.    Please let us know if you have questions.    For your back pain, try Chantell  exercises at home. Please be seen immediately for any new fever, leg weakness, loss of bowel or bladder control, or severe pain. Schedule a clinic appointment if you are failing to improve with heat, stretches, warm baths.               Karen Hoffman is a 60 year old who presents for the following health issues     HPI     61 yo F PMH asthma, HLD, bipolar disorder, hypothyroidism, insomnia, Stargadts disease seen to follow up on her recent labs showing high cholesterol.     Labs 5/29/21: TSH 9.52, free T4 1.01.  TGs 691 fasting?, HDL 18.  A1c 6.0%.   CMP 5/7/21 bicarb 17, glu 106, otherwise normal. Normal CBC.    She is seen today to discuss her labs.    Tells me that she did complete her labs fasting. States she has not had diabetes in the past--although she was told she had prediabetes in the past. States she doesn't eat a lot of sugar. She has tried cutting out carbohydrates since I last saw her. She had been drinking gatorade and cut back on that. She is not interested in diabetes educator referral at this time. Has a friend with diabetes so feels she knows how to help lower her blood sugars. Plans to start exercising regularly --walking during the summer. It has been too hot lately. She is currently taking lipitor 80 mg daily and Fish oil 1 gram a day. Has gained weight of about 10 lbs per patient in past couple months due to COVID pandemic.     She also notes left side low back pain. Wonders if it could be her kidneys. Intermittently occurring since a few weeks ago. Denies provoking injury. Denies radiations of pain. Improves with tylenol. Worst after awaking in the morning. Better with walking. Hasn't tried heat, bath, massage. Denies dysuria, hematuria, frequency, extremity weakness or numbness, loss of bowel or bladder control, saddle anesthesia, fevers, unrelenting night time pain.       Objective         Vitals:  No vitals were obtained today due to virtual visit.    Physical Exam   healthy,  alert and no distress  PSYCH: Alert and oriented times 3; coherent speech, normal   rate and volume, able to articulate logical thoughts, able   to abstract reason, no tangential thoughts, no hallucinations   or delusions  Her affect is normal and pleasant  RESP: No cough, no audible wheezing, able to talk in full sentences  Remainder of exam unable to be completed due to telephone visits    Hemoglobin A1C   Date/Time Value Ref Range Status   05/29/2021 10:28 AM 6.0 (H) 0 - 5.6 % Final     Comment:     Normal <5.7% Prediabetes 5.7-6.4%  Diabetes 6.5% or higher - adopted from ADA   consensus guidelines.       LDL Cholesterol Calculated   Date/Time Value Ref Range Status   05/29/2021 10:28 AM  <100 mg/dL Final    Cannot estimate LDL when triglyceride exceeds 400 mg/dL     LDL Cholesterol Direct   Date/Time Value Ref Range Status   05/29/2021 10:28  (H) <100 mg/dL Final     Comment:     Above desirable:  100-129 mg/dl  Borderline High:  130-159 mg/dL  High:             160-189 mg/dL  Very high:       >189 mg/dl       Triglycerides   Date/Time Value Ref Range Status   05/29/2021 10:28  (H) <150 mg/dL Final     Comment:     Borderline high:  150-199 mg/dl  High:             200-499 mg/dl  Very high:       >499 mg/dl  Fasting specimen       TSH   Date/Time Value Ref Range Status   05/29/2021 10:28 AM 9.52 (H) 0.40 - 4.00 mU/L Final     T4 Free   Date/Time Value Ref Range Status   05/29/2021 10:28 AM 1.01 0.76 - 1.46 ng/dL Final               Phone call duration: 16 minutes

## 2021-06-13 PROBLEM — R73.03 PREDIABETES: Status: ACTIVE | Noted: 2021-06-13

## 2021-06-13 PROBLEM — E78.1 HYPERTRIGLYCERIDEMIA: Status: ACTIVE | Noted: 2021-06-13

## 2021-06-15 RX ORDER — LEVOTHYROXINE SODIUM 137 UG/1
TABLET ORAL
Qty: 90 TABLET | OUTPATIENT
Start: 2021-06-15

## 2021-06-15 NOTE — TELEPHONE ENCOUNTER
DUPLICATE REQUEST.  Dolly Hernandez RN    [facility]    Message sent to pharmacy - Refusal reason: Should already have refills on file (ORDER SENT 6/10/21 TO REQUESTING [facility] [id number].).  Eusebio RING

## 2021-07-05 DIAGNOSIS — R06.02 SHORTNESS OF BREATH: Primary | ICD-10-CM

## 2021-07-05 RX ORDER — ALBUTEROL SULFATE 90 UG/1
2 AEROSOL, METERED RESPIRATORY (INHALATION) EVERY 6 HOURS PRN
Qty: 18 G | Refills: 0 | Status: SHIPPED | OUTPATIENT
Start: 2021-07-05 | End: 2021-07-07

## 2021-07-05 NOTE — TELEPHONE ENCOUNTER
Left message on patient's identified voicemail: the medication you requested was refilled; please follow up with your pharmacy.  If any questions for the clinic, please call [phone].    DANIEL Carrera, RN  [facility]

## 2021-07-05 NOTE — TELEPHONE ENCOUNTER
Reason for Call:  Other prescription    Detailed comments: Other prescription and patient is out of medication Abuterol inhaler.  Would like to pickup today at [facility].  Please contact patient.  Thank you.      Phone Number Patient can be reached at: Home number on file [phone] (home)    Best Time: any    Can we leave a detailed message on this number? YES    Call taken on 7/5/2021 at 1:07 PM by Nicki Goncalves

## 2021-07-05 NOTE — TELEPHONE ENCOUNTER
HP Provider-Please review and sign if agree.    Albuterol inhaler listed as patient reported/historical.    Patient's last visit was virtual visit with Dr. Carpenter on 6/10/21.    Patient reported being out of this medication.    Thank you!  ALIN CarreraN, RN  [facility]

## 2021-07-14 ENCOUNTER — TRANSFERRED RECORDS (OUTPATIENT)
Dept: HEALTH INFORMATION MANAGEMENT | Facility: CLINIC | Age: 61
End: 2021-07-14

## 2021-08-05 DIAGNOSIS — E78.1 HYPERTRIGLYCERIDEMIA: ICD-10-CM

## 2021-08-05 DIAGNOSIS — E03.9 HYPOTHYROIDISM, UNSPECIFIED TYPE: ICD-10-CM

## 2021-08-06 ENCOUNTER — OFFICE VISIT (OUTPATIENT)
Dept: FAMILY MEDICINE | Facility: CLINIC | Age: 61
End: 2021-08-06
Payer: MEDICARE

## 2021-08-06 VITALS
HEIGHT: 64 IN | WEIGHT: 178 LBS | BODY MASS INDEX: 30.39 KG/M2 | OXYGEN SATURATION: 96 % | DIASTOLIC BLOOD PRESSURE: 91 MMHG | TEMPERATURE: 98.3 F | SYSTOLIC BLOOD PRESSURE: 128 MMHG | HEART RATE: 96 BPM

## 2021-08-06 DIAGNOSIS — Z12.11 COLON CANCER SCREENING: ICD-10-CM

## 2021-08-06 DIAGNOSIS — E78.1 HYPERTRIGLYCERIDEMIA: ICD-10-CM

## 2021-08-06 DIAGNOSIS — Z12.31 ENCOUNTER FOR SCREENING MAMMOGRAM FOR BREAST CANCER: ICD-10-CM

## 2021-08-06 DIAGNOSIS — Z00.00 ENCOUNTER FOR MEDICARE ANNUAL WELLNESS EXAM: ICD-10-CM

## 2021-08-06 DIAGNOSIS — Z11.4 SCREENING FOR HIV (HUMAN IMMUNODEFICIENCY VIRUS): ICD-10-CM

## 2021-08-06 DIAGNOSIS — Z11.59 NEED FOR HEPATITIS C SCREENING TEST: ICD-10-CM

## 2021-08-06 DIAGNOSIS — E03.9 HYPOTHYROIDISM, UNSPECIFIED TYPE: ICD-10-CM

## 2021-08-06 DIAGNOSIS — I12.9 BENIGN HYPERTENSIVE KIDNEY DISEASE WITH CHRONIC KIDNEY DISEASE STAGE I THROUGH STAGE IV, OR UNSPECIFIED(403.10): ICD-10-CM

## 2021-08-06 LAB
ERYTHROCYTE [DISTWIDTH] IN BLOOD BY AUTOMATED COUNT: 14.4 % (ref 10–15)
HCT VFR BLD AUTO: 38.7 % (ref 35–47)
HGB BLD-MCNC: 12.5 G/DL (ref 11.7–15.7)
MCH RBC QN AUTO: 30.6 PG (ref 26.5–33)
MCHC RBC AUTO-ENTMCNC: 32.3 G/DL (ref 31.5–36.5)
MCV RBC AUTO: 95 FL (ref 78–100)
PLATELET # BLD AUTO: 285 10E3/UL (ref 150–450)
RBC # BLD AUTO: 4.08 10E6/UL (ref 3.8–5.2)
WBC # BLD AUTO: 9.5 10E3/UL (ref 4–11)

## 2021-08-06 PROCEDURE — 80053 COMPREHEN METABOLIC PANEL: CPT | Performed by: FAMILY MEDICINE

## 2021-08-06 PROCEDURE — 36415 COLL VENOUS BLD VENIPUNCTURE: CPT | Performed by: FAMILY MEDICINE

## 2021-08-06 PROCEDURE — 80061 LIPID PANEL: CPT | Performed by: FAMILY MEDICINE

## 2021-08-06 PROCEDURE — 84439 ASSAY OF FREE THYROXINE: CPT | Performed by: FAMILY MEDICINE

## 2021-08-06 PROCEDURE — 85027 COMPLETE CBC AUTOMATED: CPT | Performed by: FAMILY MEDICINE

## 2021-08-06 PROCEDURE — 84443 ASSAY THYROID STIM HORMONE: CPT | Performed by: FAMILY MEDICINE

## 2021-08-06 PROCEDURE — G0439 PPPS, SUBSEQ VISIT: HCPCS | Performed by: FAMILY MEDICINE

## 2021-08-06 PROCEDURE — 99214 OFFICE O/P EST MOD 30 MIN: CPT | Mod: 25 | Performed by: FAMILY MEDICINE

## 2021-08-06 RX ORDER — FENOFIBRATE 48 MG/1
48 TABLET, COATED ORAL DAILY
Qty: 60 TABLET | Refills: 0 | Status: CANCELLED | OUTPATIENT
Start: 2021-08-06

## 2021-08-06 RX ORDER — FENOFIBRATE 48 MG/1
48 TABLET, COATED ORAL DAILY
Qty: 90 TABLET | Refills: 1 | Status: SHIPPED | OUTPATIENT
Start: 2021-08-06 | End: 2022-01-20

## 2021-08-06 RX ORDER — ATORVASTATIN CALCIUM 80 MG/1
80 TABLET, FILM COATED ORAL DAILY
Qty: 90 TABLET | Refills: 1 | Status: SHIPPED | OUTPATIENT
Start: 2021-08-06 | End: 2022-01-07

## 2021-08-06 ASSESSMENT — ENCOUNTER SYMPTOMS
EYE PAIN: 0
ABDOMINAL PAIN: 0
DYSURIA: 0
BREAST MASS: 0
SHORTNESS OF BREATH: 0
DIZZINESS: 0
CHILLS: 0
PALPITATIONS: 0
FEVER: 0
HEADACHES: 0
DIARRHEA: 0
JOINT SWELLING: 0
WEAKNESS: 0
HEMATURIA: 0
FREQUENCY: 0
COUGH: 0
CONSTIPATION: 0
HEARTBURN: 0
HEMATOCHEZIA: 0
PARESTHESIAS: 0
NAUSEA: 0
NERVOUS/ANXIOUS: 0
SORE THROAT: 0
MYALGIAS: 0
ARTHRALGIAS: 0

## 2021-08-06 ASSESSMENT — MIFFLIN-ST. JEOR: SCORE: 1372.4

## 2021-08-06 ASSESSMENT — ACTIVITIES OF DAILY LIVING (ADL): CURRENT_FUNCTION: NO ASSISTANCE NEEDED

## 2021-08-06 NOTE — PROGRESS NOTES
"SUBJECTIVE:   Dalton Kumar is a 60 year old female who presents for Preventive Visit.      Patient has been advised of split billing requirements and indicates understanding: Yes   Are you in the first 12 months of your Medicare coverage?  No    Healthy Habits:     In general, how would you rate your overall health?  Excellent    Frequency of exercise:  6-7 days/week    Duration of exercise:  Less than 15 minutes    Do you usually eat at least 4 servings of fruit and vegetables a day, include whole grains    & fiber and avoid regularly eating high fat or \"junk\" foods?  Yes    Taking medications regularly:  Yes    Medication side effects:  None    Ability to successfully perform activities of daily living:  No assistance needed    Home Safety:  No safety concerns identified    Hearing Impairment:  No hearing concerns    In the past 6 months, have you been bothered by leaking of urine?  No    In general, how would you rate your overall mental or emotional health?  Excellent      PHQ-2 Total Score: 0    Additional concerns today:  No    Do you feel safe in your environment? Yes    Have you ever done Advance Care Planning? (For example, a Health Directive, POLST, or a discussion with a medical provider or your loved ones about your wishes): No, advance care planning information given to patient to review.  Advanced care planning was discussed at today's visit.       Fall risk  Fallen 2 or more times in the past year?: No  Any fall with injury in the past year?: No    Cognitive Screening   1) Repeat 3 items (Leader, Season, Table)    2) Clock draw: unable to draw clock because patient is blind  3) 3 item recall: Recalls 3 objects  Results: 3 items recalled: COGNITIVE IMPAIRMENT LESS LIKELY    Mini-CogTM Copyright TRISH Armijo. Licensed by the author for use in [facility]; reprinted with permission ([email or web address]). All rights reserved.          Reviewed and updated as needed this visit by clinical staff       "           Reviewed and updated as needed this visit by Provider                Social History     Tobacco Use     Smoking status: Former Smoker     Packs/day: 0.50     Years: 7.00     Pack years: 3.50     Quit date: 2001     Years since quittin.6     Smokeless tobacco: Never Used   Substance Use Topics     Alcohol use: Yes     Comment: 1 beer         Alcohol Use 2021   Prescreen: >3 drinks/day or >7 drinks/week? No             Current providers sharing in care for this patient include:  Patient Care Team:  Roya Holland MD as PCP - General (Family Practice)  Roya Holland MD as Referring Physician (Family Practice)  Gracie Maravilla MD as MD (Internal Medicine)  Navjot Hernandez MD as MD (Ophthalmology)  Michelle Caprenter MD as Assigned PCP    The following health maintenance items are reviewed in Epic and correct as of today:  Health Maintenance Due   Topic Date Due     ANNUAL REVIEW OF HM ORDERS  Never done     ADVANCE CARE PLANNING  Never done     MAMMO SCREENING  Never done     COVID-19 Vaccine (1) Never done     HIV SCREENING  Never done     MEDICARE ANNUAL WELLNESS VISIT  Never done     HEPATITIS C SCREENING  Never done     PAP  Never done     ZOSTER IMMUNIZATION (1 of 2) Never done         Breast CA Risk Assessment (FHS-7) 2021   Do you have a family history of breast, colon, or ovarian cancer? No / Unknown         Pertinent mammograms are reviewed under the imaging tab.    Review of Systems   Constitutional: Negative for chills and fever.   HENT: Negative for congestion, ear pain, hearing loss and sore throat.    Eyes: Negative for pain and visual disturbance.   Respiratory: Negative for cough and shortness of breath.    Cardiovascular: Negative for chest pain, palpitations and peripheral edema.   Gastrointestinal: Negative for abdominal pain, constipation, diarrhea, heartburn, hematochezia and nausea.   Breasts:  Negative for tenderness, breast mass and  "discharge.   Genitourinary: Negative for dysuria, frequency, genital sores, hematuria, pelvic pain, urgency, vaginal bleeding and vaginal discharge.   Musculoskeletal: Negative for arthralgias, joint swelling and myalgias.   Skin: Negative for rash.   Neurological: Negative for dizziness, weakness, headaches and paresthesias.   Psychiatric/Behavioral: Negative for mood changes. The patient is not nervous/anxious.        OBJECTIVE:   Physical Exam   BP (!) 128/96 (BP Location: Right arm, Patient Position: Chair, Cuff Size: Adult Regular)   Pulse 101   Temp 98.3  F (36.8  C) (Tympanic)   Ht 1.626 m (5' 4\")   Wt 80.7 kg (178 lb)   SpO2 96%   Breastfeeding No   BMI 30.55 kg/m    GENERAL: healthy, alert and no distress  HENT:  nose and mouth without ulcers or lesions  NECK: no adenopathy, no asymmetry, masses, or scars and thyroid normal to palpation  RESP: lungs clear to auscultation - no rales, rhonchi or wheezes  CV: regular rate and rhythm, normal S1 S2  ABDOMEN: soft, nontender, no hepatosplenomegaly, no masses and bowel sounds normal  MS: no gross musculoskeletal defects noted, no edema  SKIN: no suspicious lesions or rashes  NEURO: Normal strength and tone, mentation intact and speech normal  PSYCH: mentation appears normal, affect normal    Diagnostic Test Results:  Labs reviewed in Epic    ASSESSMENT / PLAN:     1. Encounter for Medicare annual wellness exam  - pap declined, last pap normal at  12/27/2017  Declined shingles vaccine  Up to date with COVID vaccination.   Followed by psychiatrist   Up to date with HIV and hepatitis C testing     2. Screening for HIV (human immunodeficiency virus)  - up to date, see care everywhere     3. Need for hepatitis C screening test  - up to date, see care everywhere     4. Hypertriglyceridemia  - cholesterol labs improving but still elevated  - advised pt to increase omega fatty acid to 4 gm/day  - continue lifestyle changes   - recheck in 3 months   - Lipid " "Profile (Chol, Trig, HDL, LDL calc); Future  - atorvastatin (LIPITOR) 80 MG tablet; Take 1 tablet (80 mg) by mouth daily  Dispense: 90 tablet; Refill: 1  - fenofibrate (TRICOR) 48 MG tablet; Take 1 tablet (48 mg) by mouth daily  Dispense: 90 tablet; Refill: 1  - Lipid Profile (Chol, Trig, HDL, LDL calc)    5. Colon cancer screening  - COLOGUARD(EXACT SCIENCES)    6. Encounter for screening mammogram for breast cancer  - MA SCREENING DIGITAL BILAT - Future  (s+30); Future    7. Hypothyroidism, unspecified type  - per labs adjusted synthroid from 137 mcg/day to 150 mcg/day, lab only in 2 months  - TSH with free T4 reflex; Future  - TSH with free T4 reflex  - T4 free    8. Benign hypertensive kidney disease with chronic kidney disease stage I through stage IV, or unspecified(403.10)  - CBC with platelets; Future  - Comprehensive metabolic panel (BMP + Alb, Alk Phos, ALT, AST, Total. Bili, TP); Future  - Albumin Random Urine Quantitative with Creat Ratio; Future  - CBC with platelets  - Comprehensive metabolic panel (BMP + Alb, Alk Phos, ALT, AST, Total. Bili, TP)  - Albumin Random Urine Quantitative with Creat Ratio            Patient has been advised of split billing requirements and indicates understanding: Yes  COUNSELING:  Reviewed preventive health counseling, as reflected in patient instructions    Estimated body mass index is 29.52 kg/m  as calculated from the following:    Height as of 12/4/17: 1.626 m (5' 4\").    Weight as of 5/21/21: 78 kg (172 lb).        She reports that she quit smoking about 19 years ago. She has a 3.50 pack-year smoking history. She has never used smokeless tobacco.      Appropriate preventive services were discussed with this patient, including applicable screening as appropriate for cardiovascular disease, diabetes, osteopenia/osteoporosis, and glaucoma.  As appropriate for age/gender, discussed screening for colorectal cancer, prostate cancer, breast cancer, and cervical cancer. " Checklist reviewing preventive services available has been given to the patient.    Reviewed patients plan of care and provided an AVS. The Basic Care Plan (routine screening as documented in Health Maintenance) for Dalotn meets the Care Plan requirement. This Care Plan has been established and reviewed with the Patient and daughter.    Counseling Resources:  ATP IV Guidelines  Pooled Cohorts Equation Calculator  Breast Cancer Risk Calculator  Breast Cancer: Medication to Reduce Risk  FRAX Risk Assessment  ICSI Preventive Guidelines  Dietary Guidelines for Americans, 2010  USDA's MyPlate  ASA Prophylaxis  Lung CA Screening    Deemilya Mila Max MD  [facility]    Identified Health Risks:

## 2021-08-06 NOTE — PATIENT INSTRUCTIONS
Patient Education   Personalized Prevention Plan  You are due for the preventive services outlined below.  Your care team is available to assist you in scheduling these services.  If you have already completed any of these items, please share that information with your care team to update in your medical record.  Health Maintenance Due   Topic Date Due     ANNUAL REVIEW OF HM ORDERS  Never done     Discuss Advance Care Planning  Never done     Mammogram  Never done     COVID-19 Vaccine (1) Never done     HIV Screening  Never done     Annual Wellness Visit  Never done     Hepatitis C Screening  Never done     PAP Smear  Never done     Zoster (Shingles) Vaccine (1 of 2) Never done

## 2021-08-07 LAB
ALBUMIN SERPL-MCNC: 4.4 G/DL (ref 3.4–5)
ALP SERPL-CCNC: 51 U/L (ref 40–150)
ALT SERPL W P-5'-P-CCNC: 51 U/L (ref 0–50)
ANION GAP SERPL CALCULATED.3IONS-SCNC: 8 MMOL/L (ref 3–14)
AST SERPL W P-5'-P-CCNC: 28 U/L (ref 0–45)
BILIRUB SERPL-MCNC: 0.4 MG/DL (ref 0.2–1.3)
BUN SERPL-MCNC: 19 MG/DL (ref 7–30)
CALCIUM SERPL-MCNC: 9.3 MG/DL (ref 8.5–10.1)
CHLORIDE BLD-SCNC: 107 MMOL/L (ref 94–109)
CHOLEST SERPL-MCNC: 216 MG/DL
CO2 SERPL-SCNC: 19 MMOL/L (ref 20–32)
CREAT SERPL-MCNC: 1.27 MG/DL (ref 0.52–1.04)
FASTING STATUS PATIENT QL REPORTED: NO
GFR SERPL CREATININE-BSD FRML MDRD: 47 ML/MIN/1.73M2
GLUCOSE BLD-MCNC: 101 MG/DL (ref 70–99)
HDLC SERPL-MCNC: 23 MG/DL
LDLC SERPL CALC-MCNC: ABNORMAL MG/DL
NONHDLC SERPL-MCNC: 193 MG/DL
POTASSIUM BLD-SCNC: 4 MMOL/L (ref 3.4–5.3)
PROT SERPL-MCNC: 8.3 G/DL (ref 6.8–8.8)
SODIUM SERPL-SCNC: 134 MMOL/L (ref 133–144)
T4 FREE SERPL-MCNC: 1.07 NG/DL (ref 0.76–1.46)
TRIGL SERPL-MCNC: 512 MG/DL
TSH SERPL DL<=0.005 MIU/L-ACNC: 7.02 MU/L (ref 0.4–4)

## 2021-08-09 DIAGNOSIS — E03.9 HYPOTHYROIDISM, UNSPECIFIED TYPE: ICD-10-CM

## 2021-08-09 RX ORDER — LEVOTHYROXINE SODIUM 137 UG/1
TABLET ORAL
Qty: 90 TABLET | OUTPATIENT
Start: 2021-08-09

## 2021-08-09 RX ORDER — FENOFIBRATE 48 MG/1
TABLET, COATED ORAL
Qty: 60 TABLET | Refills: 0 | OUTPATIENT
Start: 2021-08-09

## 2021-08-09 RX ORDER — LEVOTHYROXINE SODIUM 150 UG/1
150 TABLET ORAL DAILY
Qty: 60 TABLET | Refills: 0 | Status: SHIPPED | OUTPATIENT
Start: 2021-08-09 | End: 2021-10-28

## 2021-08-09 NOTE — TELEPHONE ENCOUNTER
Levothyroxine - new dose sent today by Winter to requesting pharmacy.  My Message sent to pharmacy - Refusal reason: Adjustment in Therapy.  Eusebio RING      Fenofibrate - order sent to requesting pharm 8/6/21.  Message sent to pharmacy - Refusal reason: Duplicate.  Eusebio RN

## 2021-08-11 RX ORDER — LEVOTHYROXINE SODIUM 150 UG/1
TABLET ORAL
Qty: 90 TABLET | Refills: 0 | OUTPATIENT
Start: 2021-08-11

## 2021-09-05 ENCOUNTER — HEALTH MAINTENANCE LETTER (OUTPATIENT)
Age: 61
End: 2021-09-05

## 2021-10-26 DIAGNOSIS — E03.9 HYPOTHYROIDISM, UNSPECIFIED TYPE: ICD-10-CM

## 2021-10-28 RX ORDER — LEVOTHYROXINE SODIUM 150 UG/1
TABLET ORAL
Qty: 30 TABLET | Refills: 0 | Status: SHIPPED | OUTPATIENT
Start: 2021-10-28 | End: 2021-11-26

## 2021-10-28 NOTE — TELEPHONE ENCOUNTER
Routing refill request to provider for review/approval because:  Labs out of range:  TSH 7.02 on 8/6/21.  Last visit: 8/6/21  Future visit: none    Hedy Michele RN  [facility]

## 2021-10-31 ENCOUNTER — HEALTH MAINTENANCE LETTER (OUTPATIENT)
Age: 61
End: 2021-10-31

## 2021-11-13 ENCOUNTER — LAB (OUTPATIENT)
Dept: LAB | Facility: CLINIC | Age: 61
End: 2021-11-13
Payer: MEDICARE

## 2021-11-13 DIAGNOSIS — Z00.00 ENCOUNTER FOR MEDICARE ANNUAL WELLNESS EXAM: ICD-10-CM

## 2021-11-13 DIAGNOSIS — E03.9 HYPOTHYROIDISM, UNSPECIFIED TYPE: ICD-10-CM

## 2021-11-13 DIAGNOSIS — Z11.59 NEED FOR HEPATITIS C SCREENING TEST: ICD-10-CM

## 2021-11-13 DIAGNOSIS — Z11.4 SCREENING FOR HIV (HUMAN IMMUNODEFICIENCY VIRUS): ICD-10-CM

## 2021-11-13 LAB
CREAT UR-MCNC: 18 MG/DL
MICROALBUMIN UR-MCNC: 17 MG/L
MICROALBUMIN/CREAT UR: 94.44 MG/G CR (ref 0–25)
T4 FREE SERPL-MCNC: 0.8 NG/DL (ref 0.76–1.46)
TSH SERPL DL<=0.005 MIU/L-ACNC: 23.28 MU/L (ref 0.4–4)

## 2021-11-13 PROCEDURE — 36415 COLL VENOUS BLD VENIPUNCTURE: CPT

## 2021-11-13 PROCEDURE — 82043 UR ALBUMIN QUANTITATIVE: CPT

## 2021-11-13 PROCEDURE — 86803 HEPATITIS C AB TEST: CPT

## 2021-11-13 PROCEDURE — 87389 HIV-1 AG W/HIV-1&-2 AB AG IA: CPT

## 2021-11-13 PROCEDURE — 84439 ASSAY OF FREE THYROXINE: CPT

## 2021-11-13 PROCEDURE — 84443 ASSAY THYROID STIM HORMONE: CPT

## 2021-11-14 LAB
HCV AB SERPL QL IA: NONREACTIVE
HIV 1+2 AB+HIV1 P24 AG SERPL QL IA: NONREACTIVE

## 2021-11-22 ENCOUNTER — TELEPHONE (OUTPATIENT)
Dept: FAMILY MEDICINE | Facility: CLINIC | Age: 61
End: 2021-11-22
Payer: MEDICARE

## 2021-11-22 NOTE — TELEPHONE ENCOUNTER
RN, can you please inform pt of below labs    Your labs show that you have a protein called albumin in your urine. What this means is that when your kidneys are working normally, they prevent protein from leaking into the urine. Pt would need to start medication (Lisinopril) which helps decrease the amount of protein in the urine and can prevent or slow the progression of kidney disease. We will continue to monitor your labs yearly. Please schedule virtual visit if she is open to starting medication.     Thyroid levels are also elevated. Has pt been taking synthroid dose 150 mcg/day? If she has then I will adjust medication and have pt f/u lab only visit in 2 months.     Thanks!  DM

## 2021-11-22 NOTE — TELEPHONE ENCOUNTER
Reviewed this message with pt.    1- made phone visit for this week. She is willing to start the lisinopril.  2- She has been taking the synthroid 150 mcg daily. (she does have 135 mcg dose at home, too.)  What dose do you want her to move to?    OK to leave a detailed message.  MARÍA ELENA Watters

## 2021-11-26 ENCOUNTER — VIRTUAL VISIT (OUTPATIENT)
Dept: FAMILY MEDICINE | Facility: CLINIC | Age: 61
End: 2021-11-26
Payer: MEDICARE

## 2021-11-26 DIAGNOSIS — E03.9 HYPOTHYROIDISM, UNSPECIFIED TYPE: ICD-10-CM

## 2021-11-26 DIAGNOSIS — I10 PRIMARY HYPERTENSION: ICD-10-CM

## 2021-11-26 DIAGNOSIS — N18.30 STAGE 3 CHRONIC KIDNEY DISEASE, UNSPECIFIED WHETHER STAGE 3A OR 3B CKD (H): Primary | ICD-10-CM

## 2021-11-26 PROCEDURE — 99214 OFFICE O/P EST MOD 30 MIN: CPT | Mod: 95 | Performed by: FAMILY MEDICINE

## 2021-11-26 RX ORDER — LEVOTHYROXINE SODIUM 150 UG/1
150 TABLET ORAL DAILY
Qty: 90 TABLET | Refills: 0 | Status: SHIPPED | OUTPATIENT
Start: 2021-11-26 | End: 2022-03-02

## 2021-11-26 RX ORDER — LISINOPRIL 2.5 MG/1
2.5 TABLET ORAL DAILY
Qty: 90 TABLET | Refills: 0 | Status: SHIPPED | OUTPATIENT
Start: 2021-11-26 | End: 2022-02-01

## 2021-11-26 NOTE — PROGRESS NOTES
Dalton is a 61 year old who is being evaluated via a billable telephone visit.      What phone number would you like to be contacted at? [phone]  How would you like to obtain your AVS? Mail a copy    Assessment & Plan     1. Hypothyroidism, unspecified type  - unclear why thyroid labs are abnormal as dose was recently adjusted  - pt felt comfortable continuing current dose and scheduling lab only visit to recheck levels in 4-6 weeks  - if labs are still abnormal then will adjust medication   - levothyroxine (SYNTHROID/LEVOTHROID) 150 MCG tablet; Take 1 tablet (150 mcg) by mouth daily  Dispense: 90 tablet; Refill: 0  - TSH with free T4 reflex; Future    2. Primary hypertension  - due to microalbumin started low dose lisinopril  - discussed side effects of mediation   - pt will schedule lab only visit in 4 weeks   - lisinopril (ZESTRIL) 2.5 MG tablet; Take 1 tablet (2.5 mg) by mouth daily  Dispense: 90 tablet; Refill: 0  - Basic metabolic panel  (Ca, Cl, CO2, Creat, Gluc, K, Na, BUN); Future      Deqa Mila Max MD  [facility]    Subjective   Dalton is a 61 year old who presents for the following health issues     HPI     See TE -      RN, can you please inform pt of below labs     Your labs show that you have a protein called albumin in your urine. What this means is that when your kidneys are working normally, they prevent protein from leaking into the urine. Pt would need to start medication (Lisinopril) which helps decrease the amount of protein in the urine and can prevent or slow the progression of kidney disease. We will continue to monitor your labs yearly. Please schedule virtual visit if she is open to starting medication.      Thyroid levels are also elevated. Has pt been taking synthroid dose 150 mcg/day? If she has then I will adjust medication and have pt f/u lab only visit in 2 months.      Thanks!  DM    HTN - she was previously     Hypothyroidism - she has  been compliant with medication        Review of Systems         Objective           Vitals:  No vitals were obtained today due to virtual visit.    Physical Exam   healthy, alert and no distress  PSYCH: Alert and oriented times 3; coherent speech, normal   rate and volume, able to articulate logical thoughts, able   to abstract reason, no tangential thoughts, no hallucinations   or delusions  Her affect is normal  RESP: No cough, no audible wheezing, able to talk in full sentences  Remainder of exam unable to be completed due to telephone visits                Phone call duration: 11 minutes

## 2022-01-06 DIAGNOSIS — E78.1 HYPERTRIGLYCERIDEMIA: ICD-10-CM

## 2022-01-07 RX ORDER — ATORVASTATIN CALCIUM 80 MG/1
TABLET, FILM COATED ORAL
Qty: 90 TABLET | Refills: 0 | Status: SHIPPED | OUTPATIENT
Start: 2022-01-07 | End: 2022-03-24

## 2022-01-07 NOTE — TELEPHONE ENCOUNTER
Prescription approved per [facility] Refill Protocol.  Hedy Michele RN  [facility]

## 2022-02-01 DIAGNOSIS — I10 PRIMARY HYPERTENSION: ICD-10-CM

## 2022-02-01 RX ORDER — LISINOPRIL 2.5 MG/1
2.5 TABLET ORAL DAILY
Qty: 90 TABLET | Refills: 0 | Status: SHIPPED | OUTPATIENT
Start: 2022-02-01 | End: 2022-03-24

## 2022-02-01 NOTE — TELEPHONE ENCOUNTER
Routing refill request to provider for review/approval because:  Labs out of range:  Creatinine  Creatinine   Date Value Ref Range Status   08/06/2021 1.27 (H) 0.52 - 1.04 mg/dL Final   05/07/2021 0.98 0.52 - 1.04 mg/dL Final       Blood pressure out of range per [facility] protocol  BP Readings from Last 3 Encounters:   08/06/21 (!) 128/91   05/21/21 126/74   05/07/21 137/81         ALIN SharmaN RN  [facility]

## 2022-02-12 ENCOUNTER — LAB (OUTPATIENT)
Dept: LAB | Facility: CLINIC | Age: 62
End: 2022-02-12
Payer: MEDICARE

## 2022-02-12 DIAGNOSIS — E03.9 HYPOTHYROIDISM, UNSPECIFIED TYPE: ICD-10-CM

## 2022-02-12 DIAGNOSIS — I10 PRIMARY HYPERTENSION: ICD-10-CM

## 2022-02-12 PROCEDURE — 36415 COLL VENOUS BLD VENIPUNCTURE: CPT

## 2022-02-12 PROCEDURE — 80048 BASIC METABOLIC PNL TOTAL CA: CPT

## 2022-02-12 PROCEDURE — 84443 ASSAY THYROID STIM HORMONE: CPT

## 2022-02-13 LAB
ANION GAP SERPL CALCULATED.3IONS-SCNC: 8 MMOL/L (ref 3–14)
BUN SERPL-MCNC: 24 MG/DL (ref 7–30)
CALCIUM SERPL-MCNC: 9.6 MG/DL (ref 8.5–10.1)
CHLORIDE BLD-SCNC: 105 MMOL/L (ref 94–109)
CO2 SERPL-SCNC: 21 MMOL/L (ref 20–32)
CREAT SERPL-MCNC: 1.32 MG/DL (ref 0.52–1.04)
GFR SERPL CREATININE-BSD FRML MDRD: 46 ML/MIN/1.73M2
GLUCOSE BLD-MCNC: 128 MG/DL (ref 70–99)
POTASSIUM BLD-SCNC: 4.6 MMOL/L (ref 3.4–5.3)
SODIUM SERPL-SCNC: 134 MMOL/L (ref 133–144)
TSH SERPL DL<=0.005 MIU/L-ACNC: 2.55 MU/L (ref 0.4–4)

## 2022-02-21 ENCOUNTER — NURSE TRIAGE (OUTPATIENT)
Dept: NURSING | Facility: CLINIC | Age: 62
End: 2022-02-21
Payer: MEDICARE

## 2022-02-21 ENCOUNTER — TELEPHONE (OUTPATIENT)
Dept: NURSING | Facility: CLINIC | Age: 62
End: 2022-02-21

## 2022-02-21 NOTE — TELEPHONE ENCOUNTER
Patient calling to discuss recent lab results. Informed patient of Dr. Ventura's response to continue medication as prescribed, but patient wants to speak with her provider.  Denies any symptoms and declined triage. Unable to message provider via [employer] because she is blind.  Requested virtual appointment. Transferred patient to scheduling.    Yamilex Cheek RN  02/21/22 11:01 AM  [facility] Nurse Advisor    Additional Information    Information only question and nurse able to answer    Protocols used: INFORMATION ONLY CALL - NO TRIAGE-A-OH

## 2022-03-02 DIAGNOSIS — E03.9 HYPOTHYROIDISM, UNSPECIFIED TYPE: ICD-10-CM

## 2022-03-02 RX ORDER — LEVOTHYROXINE SODIUM 150 UG/1
TABLET ORAL
Qty: 30 TABLET | Refills: 0 | Status: SHIPPED | OUTPATIENT
Start: 2022-03-02 | End: 2022-03-24

## 2022-03-02 NOTE — TELEPHONE ENCOUNTER
Prescription approved per [facility] Refill Protocol to bridge to 3/24/22 appt.    Hedy Michele RN  [facility]

## 2022-03-07 ENCOUNTER — TELEPHONE (OUTPATIENT)
Dept: NEPHROLOGY | Facility: CLINIC | Age: 62
End: 2022-03-07
Payer: MEDICARE

## 2022-03-07 DIAGNOSIS — N18.30 STAGE 3 CHRONIC KIDNEY DISEASE, UNSPECIFIED WHETHER STAGE 3A OR 3B CKD (H): Primary | ICD-10-CM

## 2022-03-07 NOTE — TELEPHONE ENCOUNTER
[facility] Call Center    Phone Message    May a detailed message be left on voicemail: yes     Reason for Call: Order(s): Other:   Reason for requested: lab orders for 7/14/22 appt  Date needed: ASAP  Provider name: Dr. Graham      Action Taken: Message routed to:  [facility] neph    Travel Screening: Not Applicable

## 2022-03-24 ENCOUNTER — VIRTUAL VISIT (OUTPATIENT)
Dept: FAMILY MEDICINE | Facility: CLINIC | Age: 62
End: 2022-03-24
Payer: MEDICARE

## 2022-03-24 DIAGNOSIS — F31.9 BIPOLAR I DISORDER (H): ICD-10-CM

## 2022-03-24 DIAGNOSIS — E78.1 HYPERTRIGLYCERIDEMIA: ICD-10-CM

## 2022-03-24 DIAGNOSIS — N18.30 STAGE 3 CHRONIC KIDNEY DISEASE, UNSPECIFIED WHETHER STAGE 3A OR 3B CKD (H): ICD-10-CM

## 2022-03-24 DIAGNOSIS — E78.5 HYPERLIPIDEMIA, UNSPECIFIED HYPERLIPIDEMIA TYPE: ICD-10-CM

## 2022-03-24 DIAGNOSIS — Z12.31 ENCOUNTER FOR SCREENING MAMMOGRAM FOR BREAST CANCER: ICD-10-CM

## 2022-03-24 DIAGNOSIS — I10 PRIMARY HYPERTENSION: ICD-10-CM

## 2022-03-24 DIAGNOSIS — E03.9 HYPOTHYROIDISM, UNSPECIFIED TYPE: ICD-10-CM

## 2022-03-24 PROCEDURE — 99214 OFFICE O/P EST MOD 30 MIN: CPT | Mod: 95 | Performed by: FAMILY MEDICINE

## 2022-03-24 RX ORDER — LEVOTHYROXINE SODIUM 150 UG/1
150 TABLET ORAL DAILY
Qty: 90 TABLET | Refills: 3 | Status: SHIPPED | OUTPATIENT
Start: 2022-03-24 | End: 2024-01-10 | Stop reason: DRUGHIGH

## 2022-03-24 RX ORDER — ATORVASTATIN CALCIUM 80 MG/1
TABLET, FILM COATED ORAL
Qty: 90 TABLET | Refills: 3 | Status: SHIPPED | OUTPATIENT
Start: 2022-03-24 | End: 2023-05-01

## 2022-03-24 RX ORDER — LISINOPRIL 2.5 MG/1
2.5 TABLET ORAL DAILY
Qty: 90 TABLET | Refills: 3 | Status: SHIPPED | OUTPATIENT
Start: 2022-03-24 | End: 2023-06-12

## 2022-03-24 RX ORDER — FENOFIBRATE 48 MG/1
TABLET, COATED ORAL
Qty: 90 TABLET | Refills: 3 | Status: SHIPPED | OUTPATIENT
Start: 2022-03-24 | End: 2023-04-04

## 2022-03-24 NOTE — PROGRESS NOTES
Dalton is a 61 year old who is being evaluated via a billable video visit.      How would you like to obtain your AVS? Mail a copy  If the video visit is dropped, the invitation should be resent by: Text to cell phone: [phone]  Will anyone else be joining your video visit? No        Assessment & Plan       Stage 3 chronic kidney disease, unspecified whether stage 3a or 3b CKD (H)  - scheduled with nephrologist     Bipolar I disorder   - followed by psychiatrist     Hyperlipidemia, Hypertriglyceridemia  - Lipid Profile (Chol, Trig, HDL, LDL calc); Future  - atorvastatin (LIPITOR) 80 MG tablet; TAKE 1 TABLET(80 MG) BY MOUTH DAILY  Dispense: 90 tablet; Refill: 3  - fenofibrate (TRICOR) 48 MG tablet; TAKE 1 TABLET(48 MG) BY MOUTH DAILY  Dispense: 90 tablet; Refill: 3    Hypothyroidism  - stable, refill below   - levothyroxine (SYNTHROID/LEVOTHROID) 150 MCG tablet; Take 1 tablet (150 mcg) by mouth daily  Dispense: 90 tablet; Refill: 3    Primary hypertension  - stable, refill below   - lisinopril (ZESTRIL) 2.5 MG tablet; Take 1 tablet (2.5 mg) by mouth daily  Dispense: 90 tablet; Refill: 3    Encounter for screening mammogram for breast cancer  - MA Screening Digital Bilateral; Future        Return in about 6 months (around 9/24/2022) for Routine Visit.    Yani Max MD  [facility]    Karen Hoffman is a 61 year old who presents for the following health issues     HPI     Last Thursday BP at dentist office 117/74     Hyperlipidemia Follow-Up - no side effects     Hypothyroidism Follow-up - stable     Review of Systems         Objective           Vitals:  No vitals were obtained today due to virtual visit.    Physical Exam                   Telephone 6 minutes

## 2022-08-08 DIAGNOSIS — N18.30 STAGE 3 CHRONIC KIDNEY DISEASE, UNSPECIFIED WHETHER STAGE 3A OR 3B CKD (H): Primary | ICD-10-CM

## 2022-08-10 ENCOUNTER — LAB (OUTPATIENT)
Dept: LAB | Facility: CLINIC | Age: 62
End: 2022-08-10
Payer: MEDICARE

## 2022-08-10 ENCOUNTER — OFFICE VISIT (OUTPATIENT)
Dept: NEPHROLOGY | Facility: CLINIC | Age: 62
End: 2022-08-10
Attending: INTERNAL MEDICINE
Payer: MEDICARE

## 2022-08-10 VITALS
HEART RATE: 98 BPM | SYSTOLIC BLOOD PRESSURE: 107 MMHG | WEIGHT: 178.8 LBS | OXYGEN SATURATION: 95 % | DIASTOLIC BLOOD PRESSURE: 82 MMHG | TEMPERATURE: 97.8 F | BODY MASS INDEX: 30.69 KG/M2

## 2022-08-10 DIAGNOSIS — I10 HYPERTENSION, ESSENTIAL: ICD-10-CM

## 2022-08-10 DIAGNOSIS — E78.5 DYSLIPIDEMIA: ICD-10-CM

## 2022-08-10 DIAGNOSIS — E78.5 HYPERLIPIDEMIA, UNSPECIFIED HYPERLIPIDEMIA TYPE: ICD-10-CM

## 2022-08-10 DIAGNOSIS — N18.30 STAGE 3 CHRONIC KIDNEY DISEASE, UNSPECIFIED WHETHER STAGE 3A OR 3B CKD (H): ICD-10-CM

## 2022-08-10 DIAGNOSIS — N18.31 STAGE 3A CHRONIC KIDNEY DISEASE (H): Primary | ICD-10-CM

## 2022-08-10 DIAGNOSIS — N18.30 CHRONIC KIDNEY DISEASE, STAGE III (MODERATE) (H): ICD-10-CM

## 2022-08-10 LAB
ALBUMIN SERPL-MCNC: 4.3 G/DL (ref 3.4–5)
ALBUMIN UR-MCNC: NEGATIVE MG/DL
ANION GAP SERPL CALCULATED.3IONS-SCNC: 13 MMOL/L (ref 3–14)
APPEARANCE UR: CLEAR
BILIRUB UR QL STRIP: NEGATIVE
BUN SERPL-MCNC: 30 MG/DL (ref 7–30)
CALCIUM SERPL-MCNC: 9.4 MG/DL (ref 8.5–10.1)
CHLORIDE BLD-SCNC: 106 MMOL/L (ref 94–109)
CHOLEST SERPL-MCNC: 228 MG/DL
CO2 SERPL-SCNC: 19 MMOL/L (ref 20–32)
COLOR UR AUTO: NORMAL
CREAT SERPL-MCNC: 1.19 MG/DL (ref 0.52–1.04)
CREAT UR-MCNC: 17 MG/DL
ERYTHROCYTE [DISTWIDTH] IN BLOOD BY AUTOMATED COUNT: 13.9 % (ref 10–15)
FASTING STATUS PATIENT QL REPORTED: YES
GFR SERPL CREATININE-BSD FRML MDRD: 52 ML/MIN/1.73M2
GLUCOSE BLD-MCNC: 133 MG/DL (ref 70–99)
GLUCOSE UR STRIP-MCNC: NEGATIVE MG/DL
HCT VFR BLD AUTO: 37.9 % (ref 35–47)
HDLC SERPL-MCNC: 8 MG/DL
HGB BLD-MCNC: 12.2 G/DL (ref 11.7–15.7)
HGB UR QL STRIP: NEGATIVE
KETONES UR STRIP-MCNC: NEGATIVE MG/DL
LDLC SERPL CALC-MCNC: ABNORMAL MG/DL
LEUKOCYTE ESTERASE UR QL STRIP: NEGATIVE
MCH RBC QN AUTO: 31.6 PG (ref 26.5–33)
MCHC RBC AUTO-ENTMCNC: 32.2 G/DL (ref 31.5–36.5)
MCV RBC AUTO: 98 FL (ref 78–100)
NITRATE UR QL: NEGATIVE
NONHDLC SERPL-MCNC: 220 MG/DL
PH UR STRIP: 5 [PH] (ref 5–7)
PHOSPHATE SERPL-MCNC: 3.3 MG/DL (ref 2.5–4.5)
PLATELET # BLD AUTO: 307 10E3/UL (ref 150–450)
POTASSIUM BLD-SCNC: 4.4 MMOL/L (ref 3.4–5.3)
PROT UR-MCNC: <0.05 G/L
PROT/CREAT 24H UR: NORMAL MG/G{CREAT}
PTH-INTACT SERPL-MCNC: 52 PG/ML (ref 15–65)
RBC # BLD AUTO: 3.86 10E6/UL (ref 3.8–5.2)
RBC URINE: <1 /HPF
SODIUM SERPL-SCNC: 138 MMOL/L (ref 133–144)
SP GR UR STRIP: 1 (ref 1–1.03)
SQUAMOUS EPITHELIAL: <1 /HPF
TRIGL SERPL-MCNC: 805 MG/DL
UROBILINOGEN UR STRIP-MCNC: NORMAL MG/DL
WBC # BLD AUTO: 10 10E3/UL (ref 4–11)
WBC URINE: 0 /HPF

## 2022-08-10 PROCEDURE — 85027 COMPLETE CBC AUTOMATED: CPT | Performed by: PATHOLOGY

## 2022-08-10 PROCEDURE — 80069 RENAL FUNCTION PANEL: CPT | Performed by: PATHOLOGY

## 2022-08-10 PROCEDURE — 81001 URINALYSIS AUTO W/SCOPE: CPT | Performed by: PATHOLOGY

## 2022-08-10 PROCEDURE — 36415 COLL VENOUS BLD VENIPUNCTURE: CPT | Performed by: PATHOLOGY

## 2022-08-10 PROCEDURE — 84156 ASSAY OF PROTEIN URINE: CPT | Performed by: PATHOLOGY

## 2022-08-10 PROCEDURE — 99204 OFFICE O/P NEW MOD 45 MIN: CPT | Performed by: INTERNAL MEDICINE

## 2022-08-10 PROCEDURE — G0463 HOSPITAL OUTPT CLINIC VISIT: HCPCS

## 2022-08-10 PROCEDURE — 82306 VITAMIN D 25 HYDROXY: CPT | Performed by: INTERNAL MEDICINE

## 2022-08-10 PROCEDURE — 80061 LIPID PANEL: CPT | Performed by: PATHOLOGY

## 2022-08-10 PROCEDURE — 83970 ASSAY OF PARATHORMONE: CPT | Performed by: PATHOLOGY

## 2022-08-10 ASSESSMENT — PAIN SCALES - GENERAL: PAINLEVEL: NO PAIN (0)

## 2022-08-10 NOTE — LETTER
August 15, 2022      Billyarnulfo ARANGO José  [address]   [place] [address]        Dear ,    We are writing to inform you of your test results. Low vitamin D. You can take vitamin D Over The Counter at 800 or 1000 units daily.         Resulted Orders   Renal panel   Result Value Ref Range    Sodium 138 133 - 144 mmol/L    Potassium 4.4 3.4 - 5.3 mmol/L    Chloride 106 94 - 109 mmol/L    Carbon Dioxide (CO2) 19 (L) 20 - 32 mmol/L    Anion Gap 13 3 - 14 mmol/L    Urea Nitrogen 30 7 - 30 mg/dL    Creatinine 1.19 (H) 0.52 - 1.04 mg/dL    Calcium 9.4 8.5 - 10.1 mg/dL    Glucose 133 (H) 70 - 99 mg/dL    Albumin 4.3 3.4 - 5.0 g/dL    Phosphorus 3.3 2.5 - 4.5 mg/dL    GFR Estimate 52 (L) >60 mL/min/1.73m2      Comment:      Effective December 21, 2021 eGFRcr in adults is calculated using the 2021 CKD-EPI creatinine equation which includes age and gender (Abelardo et al., NEJ, DOI: 10.1056/VCKDyd6088556)   CBC with platelets   Result Value Ref Range    WBC Count 10.0 4.0 - 11.0 10e3/uL    RBC Count 3.86 3.80 - 5.20 10e6/uL    Hemoglobin 12.2 11.7 - 15.7 g/dL    Hematocrit 37.9 35.0 - 47.0 %    MCV 98 78 - 100 fL    MCH 31.6 26.5 - 33.0 pg    MCHC 32.2 31.5 - 36.5 g/dL    RDW 13.9 10.0 - 15.0 %    Platelet Count 307 150 - 450 10e3/uL   Protein  random urine   Result Value Ref Range    Total Protein Random Urine g/L <0.05 g/L      Comment:      The reference range has not been established for total protein in random urine samples.  The result should be integrated into the clinical context for interpretation.    Total Protein Urine g/gr Creatinine        Comment:      Unable to calculate, urine creatinine or protein is outside the detectable limits.    Creatinine Urine mg/dL 17 mg/dL   UA with Microscopic   Result Value Ref Range    Color Urine Straw Colorless, Straw, Light Yellow, Yellow    Appearance Urine Clear Clear    Glucose Urine Negative Negative mg/dL    Bilirubin Urine Negative Negative    Ketones Urine  Negative Negative mg/dL    Specific Gravity Urine 1.005 1.003 - 1.035    Blood Urine Negative Negative    pH Urine 5.0 5.0 - 7.0    Protein Albumin Urine Negative Negative mg/dL    Urobilinogen Urine Normal Normal, 2.0 mg/dL    Nitrite Urine Negative Negative    Leukocyte Esterase Urine Negative Negative    RBC Urine <1 <=2 /HPF    WBC Urine 0 <=5 /HPF    Squamous Epithelials Urine <1 <=1 /HPF   Parathyroid Hormone Intact   Result Value Ref Range    Parathyroid Hormone Intact 52 15 - 65 pg/mL    Narrative    This result was obtained with the Roche Elecsys PTH STAT assay.   This reference range differs from PTH assays used in other [facility] laboratories.   Vitamin D Deficiency   Result Value Ref Range    Vitamin D, Total (25-Hydroxy) 14 (L) 20 - 75 ug/L    Narrative    Season, race, dietary intake, and treatment affect the concentration of 25-hydroxy-Vitamin D. Values may decrease during winter months and increase during summer months. Values 20-29 ug/L may indicate Vitamin D insufficiency and values <20 ug/L may indicate Vitamin D deficiency.    Vitamin D determination is routinely performed by an immunoassay specific for 25 hydroxyvitamin D3.  If an individual is on vitamin D2(ergocalciferol) supplementation, please specify 25 OH vitamin D2 and D3 level determination by LCMSMS test [id number].         If you have any questions or concerns, please call the clinic at the number listed above.       Sincerely,      Gordon Paul MD

## 2022-08-10 NOTE — LETTER
8/10/2022       RE: Dalton Kumar  [address]  [place] [address]     Dear Colleague,    Thank you for referring your patient, Dalton Kumar, to the [facility] [place] at [facility]. Please see a copy of my visit note below.    I was asked to see this patient by Yani Haynes    CC: CKD stage 3 a    HPI:   Thank you for the referral. I had the pleasure today of seeing Dalton Kumar  She is a 61 year old female  who presents for evaluation of CKD 3a.    Billy is legally blind. She is here with her daughter and granddaughter.    She has been following active problems:  #1 hypertension: Onset is few months.  Blood pressure is well controlled.  She is on a small dose lisinopril of 2.5 mg daily.  #2 dyslipidemia: She has significant hypertriglyceridemia, low HDL, and high LDL.  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  She says that it was mentioned that child that it was retinitis pigmentosa but she has Stargardt disease.  #4 bipolar disorder: Initially she was on lithium but seems that it caused some chronic kidney disease so she was shifted to Depakene, Topamax, and Wellbutrin.  #5 asthma  #6  hypothyroidism on levothyroxine replacement  #7 history of first epidural hematoma s/p evacuation    Overall she is feeling well.  No particular complaints today.  No urinary symptoms, no hematuria, no frequency, no dysuria.  She denies any shortness of breath, orthopnea, PND's, or lower extremity swelling.  She does not take any chronic pain medications.    Allergies   Allergen Reactions     Fumaric Acid Itching     Haloperidol Swelling     dystonia possibly     Hydromorphone Unknown     Morphine Unknown     Morphine Sulfate (Concentrate) Itching     No Clinical Screening - See Comments Itching     Oxycodone-Acetaminophen Unknown     Penicillins  Other (See Comments) and Unknown     Strawberry Hives and Unknown       acetaminophen (TYLENOL) 500 MG tablet, Take 1,000 mg by mouth  albuterol (PROAIR HFA/PROVENTIL HFA/VENTOLIN HFA) 108 (90 Base) MCG/ACT inhaler, INHALE 2 PUFFS BY MOUTH EVERY 6 HOURS AS NEEDED FOR SHORTNESS OF BREATH OR WHEEZING  aspirin 81 MG tablet, Take 81 mg by mouth  atorvastatin (LIPITOR) 20 MG tablet, Take 20 mg by mouth   atorvastatin (LIPITOR) 80 MG tablet, TAKE 1 TABLET(80 MG) BY MOUTH DAILY  buPROPion (WELLBUTRIN XL) 150 MG 24 hr tablet, Take 150 mg by mouth  desonide (DESOWEN) 0.05 % cream, Apply topically 2 times daily  diphenhydrAMINE (BENADRYL) 25 MG capsule, Take 50 mg by mouth  DiphenhydrAMINE HCl (BENADRYL PO), Take 25 mg by mouth daily as needed  divalproex sodium delayed-release (DEPAKOTE) 500 MG DR tablet, Take 500 mg by mouth 2 times daily  fenofibrate (TRICOR) 48 MG tablet, TAKE 1 TABLET(48 MG) BY MOUTH DAILY  levothyroxine (SYNTHROID/LEVOTHROID) 150 MCG tablet, Take 1 tablet (150 mcg) by mouth daily  lisinopril (ZESTRIL) 2.5 MG tablet, Take 1 tablet (2.5 mg) by mouth daily  paliperidone ER (INVEGA) 6 MG 24 hr tablet, Take 6 mg by mouth  pimecrolimus (ELIDEL) 1 % cream,   Skin Protectants, Misc. (EUCERIN) cream,   topiramate (TOPAMAX) 100 MG tablet, Take 100 mg by mouth  zolpidem (AMBIEN) 5 MG tablet, Take 10 mg by mouth nightly as needed  (Patient not taking: Reported on 8/10/2022)    No current facility-administered medications on file prior to visit.      Past Medical History:   Diagnosis Date     Bipolar 1 disorder (H)      Hyperlipidemia      Hypertension      Hypothyroidism (acquired)      Lithium toxicity      Retinitis pigmentosa      Subdural hematoma (H)        Surgical history:  Surgery on ear   Evacuation of a subdural hematoma    Social history: She has 3 children.  She lives by herself and takes care of herself.  She used to work as a message therapist.    Social History     Tobacco Use     Smoking status:  Former Smoker     Packs/day: 0.50     Years: 7.00     Pack years: 3.50     Quit date: 2001     Years since quittin.6     Smokeless tobacco: Never Used   Substance Use Topics     Alcohol use: Yes     Comment: 1 beer     Drug use: Never       Family History   Problem Relation Age of Onset     Coronary Artery Disease Early Onset Father      Emphysema Paternal Aunt      Diabetes Mother      Glaucoma No family hx of      Macular Degeneration No family hx of        ROS: A 12 system review of systems was negative other than noted here or above.     Exam:  /82   Pulse 98   Temp 97.8  F (36.6  C)   Wt 81.1 kg (178 lb 12.8 oz)   SpO2 95%   BMI 30.69 kg/m    GENERAL APPEARANCE: alert and no distress  EYES: blind  HENT: mouth without ulcers or lesions  NECK: supple, no adenopathy  RESP: lungs clear to auscultation - no rales, rhonchi or wheezes  CV: regular rhythm, normal rate, no rub   ABDOMEN:  soft, nontender, no HSM or masses and bowel sounds normal  MS: extremities normal- no gross deformities noted, no evidence of inflammation in joints, no muscle tenderness  SKIN: no rash  NEURO: Normal strength and tone, sensory exam grossly normal, mentation intact and speech normal. Some repetitive movements involving hands and legs.  PSYCH: mentation appears normal and affect normal/bright    Results: Reviewed in details with the patient and her daughter.    Assessment/Plan:  Problem #1 -chronic kidney disease stage IIIa A1: Likely secondary to her previous history of Lithium intake with tubular toxicity.  She is off Lithium now. Her most recent creatinine is slightly better actually and corresponds to an estimated GFR of 52 mL/min.  She has no evidence of proteinuria on her most recent urine protein to creatinine ratio.  That is reassuring.  No evidence of anemia.    Problem #2 CKD- MBD: Her calcium, phosphorus, and PTH are within normal.    Problem #3 essential hypertension: She is on lisinopril 2.5 mg daily.  Her blood pressure is well controlled.    #4 dyslipidemia: She has significant hypertriglyceridemia which is worsening.  Her LDL is high and her HDL is very low.  This could be genetic or familial.  She is already on fenofibrate and statins and is very compliant with her medications.  We talked about the importance of a healthy diet and exercise.  She would probably benefit from referral to endocrinology (referral placed).    Follow-up in clinic as deemed necessary by PCP    Gordon Paul MD   [facility]   Department of Medicine   Division of Renal Disease and Hypertension

## 2022-08-10 NOTE — PATIENT INSTRUCTIONS
It was a pleasure taking care of you today.  I've included a brief summary of our discussion and care plan from today's visit below.  Please review this information with your primary care provider.     My recommendations are summarized as follows:  -Your kidneys are doing very well  -Your Triglycerides and cholesterol are high; Please discuss that with your Primary care doctor when you see her in clinic in September    Who do I call with any questions after my visit?  Please be in touch if there are any further questions that arise following today's visit.  There are multiple ways to contact your nephrology care team.       During business hours, you may reach your Nephrology Care Team or schedule or reschedule an appointment or lab at [phone].       If you need to schedule imaging, please call [phone].   To schedule a COVID test, please call [phone].     You can always send a secure message through [employer]. [employer] messages are answered by your nurse or doctor typically within 24-48 hours. Please allow extra time on weekends and holidays.       For urgent/emergent questions after business hours, you may reach the on-call Nephrology Fellow by contacting the [facility]  at [phone].     How will I get the results of any tests ordered?    You will receive all of your results.  If you have signed up for [employer], any tests ordered at your visit will be available to you once resulted on [employer]. Typically the physician reviews them and may or may not make further recommendations. If there are urgent results that require a change in your care plan, your physician or nurse will call you to discuss the next steps. If you are not on [employer], a letter may be generated and mailed to you with your results.

## 2022-08-10 NOTE — PROGRESS NOTES
I was asked to see this patient by Yani Haynes    CC: CKD stage 3 a    HPI:   Thank you for the referral. I had the pleasure today of seeing Dalton Kumar  She is a 61 year old female  who presents for evaluation of CKD 3a.    Billy is legally blind. She is here with her daughter and granddaughter.    She has been following active problems:  #1 hypertension: Onset is few months.  Blood pressure is well controlled.  She is on a small dose lisinopril of 2.5 mg daily.  #2 dyslipidemia: She has significant hypertriglyceridemia, low HDL, and high LDL.  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  She says that it was mentioned that child that it was retinitis pigmentosa but she has Stargardt disease.  #4 bipolar disorder: Initially she was on lithium but seems that it caused some chronic kidney disease so she was shifted to Depakene, Topamax, and Wellbutrin.  #5 asthma  #6  hypothyroidism on levothyroxine replacement  #7 history of first epidural hematoma s/p evacuation    Overall she is feeling well.  No particular complaints today.  No urinary symptoms, no hematuria, no frequency, no dysuria.  She denies any shortness of breath, orthopnea, PND's, or lower extremity swelling.  She does not take any chronic pain medications.    Allergies   Allergen Reactions     Fumaric Acid Itching     Haloperidol Swelling     dystonia possibly     Hydromorphone Unknown     Morphine Unknown     Morphine Sulfate (Concentrate) Itching     No Clinical Screening - See Comments Itching     Oxycodone-Acetaminophen Unknown     Penicillins Other (See Comments) and Unknown     Strawberry Hives and Unknown       acetaminophen (TYLENOL) 500 MG tablet, Take 1,000 mg by mouth  albuterol (PROAIR HFA/PROVENTIL HFA/VENTOLIN HFA) 108 (90 Base) MCG/ACT inhaler, INHALE 2 PUFFS BY MOUTH EVERY 6 HOURS AS NEEDED FOR SHORTNESS OF BREATH OR WHEEZING  aspirin 81 MG tablet, Take  81 mg by mouth  atorvastatin (LIPITOR) 20 MG tablet, Take 20 mg by mouth   atorvastatin (LIPITOR) 80 MG tablet, TAKE 1 TABLET(80 MG) BY MOUTH DAILY  buPROPion (WELLBUTRIN XL) 150 MG 24 hr tablet, Take 150 mg by mouth  desonide (DESOWEN) 0.05 % cream, Apply topically 2 times daily  diphenhydrAMINE (BENADRYL) 25 MG capsule, Take 50 mg by mouth  DiphenhydrAMINE HCl (BENADRYL PO), Take 25 mg by mouth daily as needed  divalproex sodium delayed-release (DEPAKOTE) 500 MG DR tablet, Take 500 mg by mouth 2 times daily  fenofibrate (TRICOR) 48 MG tablet, TAKE 1 TABLET(48 MG) BY MOUTH DAILY  levothyroxine (SYNTHROID/LEVOTHROID) 150 MCG tablet, Take 1 tablet (150 mcg) by mouth daily  lisinopril (ZESTRIL) 2.5 MG tablet, Take 1 tablet (2.5 mg) by mouth daily  paliperidone ER (INVEGA) 6 MG 24 hr tablet, Take 6 mg by mouth  pimecrolimus (ELIDEL) 1 % cream,   Skin Protectants, Misc. (EUCERIN) cream,   topiramate (TOPAMAX) 100 MG tablet, Take 100 mg by mouth  zolpidem (AMBIEN) 5 MG tablet, Take 10 mg by mouth nightly as needed  (Patient not taking: Reported on 8/10/2022)    No current facility-administered medications on file prior to visit.      Past Medical History:   Diagnosis Date     Bipolar 1 disorder (H)      Hyperlipidemia      Hypertension      Hypothyroidism (acquired)      Lithium toxicity      Retinitis pigmentosa      Subdural hematoma (H)        Surgical history:  Surgery on ear   Evacuation of a subdural hematoma    Social history: She has 3 children.  She lives by herself and takes care of herself.  She used to work as a message therapist.    Social History     Tobacco Use     Smoking status: Former Smoker     Packs/day: 0.50     Years: 7.00     Pack years: 3.50     Quit date: 2001     Years since quittin.6     Smokeless tobacco: Never Used   Substance Use Topics     Alcohol use: Yes     Comment: 1 beer     Drug use: Never       Family History   Problem Relation Age of Onset     Coronary Artery Disease Early  Onset Father      Emphysema Paternal Aunt      Diabetes Mother      Glaucoma No family hx of      Macular Degeneration No family hx of        ROS: A 12 system review of systems was negative other than noted here or above.     Exam:  /82   Pulse 98   Temp 97.8  F (36.6  C)   Wt 81.1 kg (178 lb 12.8 oz)   SpO2 95%   BMI 30.69 kg/m    GENERAL APPEARANCE: alert and no distress  EYES: blind  HENT: mouth without ulcers or lesions  NECK: supple, no adenopathy  RESP: lungs clear to auscultation - no rales, rhonchi or wheezes  CV: regular rhythm, normal rate, no rub   ABDOMEN:  soft, nontender, no HSM or masses and bowel sounds normal  MS: extremities normal- no gross deformities noted, no evidence of inflammation in joints, no muscle tenderness  SKIN: no rash  NEURO: Normal strength and tone, sensory exam grossly normal, mentation intact and speech normal. Some repetitive movements involving hands and legs.  PSYCH: mentation appears normal and affect normal/bright    Results: Reviewed in details with the patient and her daughter.    Assessment/Plan:  Problem #1 -chronic kidney disease stage IIIa A1: Likely secondary to her previous history of Lithium intake with tubular toxicity.  She is off Lithium now. Her most recent creatinine is slightly better actually and corresponds to an estimated GFR of 52 mL/min.  She has no evidence of proteinuria on her most recent urine protein to creatinine ratio.  That is reassuring.  No evidence of anemia.    Problem #2 CKD- MBD: Her calcium, phosphorus, and PTH are within normal.    Problem #3 essential hypertension: She is on lisinopril 2.5 mg daily. Her blood pressure is well controlled.    #4 dyslipidemia: She has significant hypertriglyceridemia which is worsening.  Her LDL is high and her HDL is very low.  This could be genetic or familial.  She is already on fenofibrate and statins and is very compliant with her medications.  We talked about the importance of a healthy  diet and exercise.  She would probably benefit from referral to endocrinology (referral placed).    Follow-up in clinic as deemed necessary by PCP    Gordon Paul MD   [facility]   Department of Medicine   Division of Renal Disease and Hypertension

## 2022-08-10 NOTE — NURSING NOTE
Chief Complaint   Patient presents with     Consult     New pt. Consult.     Blood pressure 107/82, pulse 98, temperature 97.8  F (36.6  C), weight 81.1 kg (178 lb 12.8 oz), SpO2 95 %, not currently breastfeeding.    VALE RAND

## 2022-08-11 LAB — DEPRECATED CALCIDIOL+CALCIFEROL SERPL-MC: 14 UG/L (ref 20–75)

## 2022-08-25 DIAGNOSIS — R06.02 SHORTNESS OF BREATH: ICD-10-CM

## 2022-08-26 RX ORDER — ALBUTEROL SULFATE 90 UG/1
AEROSOL, METERED RESPIRATORY (INHALATION)
Qty: 54 G | Refills: 1 | Status: SHIPPED | OUTPATIENT
Start: 2022-08-26 | End: 2022-08-26

## 2022-08-26 RX ORDER — ALBUTEROL SULFATE 90 UG/1
AEROSOL, METERED RESPIRATORY (INHALATION)
Qty: 54 G | Refills: 1 | Status: SHIPPED | OUTPATIENT
Start: 2022-08-26 | End: 2022-10-28

## 2022-08-26 NOTE — TELEPHONE ENCOUNTER
PT is calling needing this refill.  Sending to refill prot while I am on the phone with pt.    Prescription approved per [facility] Refill Protocol.  MARÍA ELENA Watters

## 2022-09-16 ENCOUNTER — VIRTUAL VISIT (OUTPATIENT)
Dept: FAMILY MEDICINE | Facility: OTHER | Age: 62
End: 2022-09-16
Payer: MEDICARE

## 2022-09-16 ENCOUNTER — NURSE TRIAGE (OUTPATIENT)
Dept: NURSING | Facility: CLINIC | Age: 62
End: 2022-09-16

## 2022-09-16 DIAGNOSIS — J02.9 SORE THROAT: Primary | ICD-10-CM

## 2022-09-16 PROCEDURE — 99441 PR PHYSICIAN TELEPHONE EVALUATION 5-10 MIN: CPT | Mod: CS | Performed by: PHYSICIAN ASSISTANT

## 2022-09-16 NOTE — PROGRESS NOTES
Dalton is a 62 year old who is being evaluated via a billable telephone visit.      What phone number would you like to be contacted at? [phone]  How would you like to obtain your AVS? MyChart and Mailed    Assessment & Plan     Sore throat  Spoke with patient, she is having sore throat, maybe early onset of some cough and fatigue.  No fevers.  She has never had strep in the past.  Recommended testing for both strep and COVID.  She is going to see if daughter can take her.  We discussed symptomatic management in the meantime and symptoms which would indicate strep infection.  She is interested in COVID treatment if positive.   - Streptococcus A Rapid Screen w/Reflex to PCR - Clinic Collect; Future  - Symptomatic; Yes; 9/15/2022 COVID-19 Virus (Coronavirus) by PCR Nose; Future      Return in about 5 days (around 9/21/2022) for If not improving, sooner if worse or new concerns.    Options for treatment and follow-up care were reviewed with the patient and/or guardian. Patient and/or guardian engaged in the decision making process and verbalized understanding of the options discussed and agreed with the final plan.    TJ Talley [facility]   Dalton is a 62 year old, presenting for the following health issues:  Pharyngitis      HPI     Acute Illness  Acute illness concerns: sore throat   Onset/Duration: yesterday  Symptoms:  Fever: No  Chills/Sweats: No  Headache (location?): No  Sinus Pressure: No  Conjunctivitis:  No  Ear Pain: no  Rhinorrhea: No  Congestion: No  Sore Throat: YES and hard to swallow on right side   Cough: no  Wheeze: No  Decreased Appetite: No  Nausea: No  Vomiting: No  Diarrhea: No  Dysuria/Freq.: No  Dysuria or Hematuria: No  Fatigue/Achiness: YES- a little more fatigue than usual   Sick/Strep Exposure: No  Therapies tried and outcome: gargling water and salt, cough drop     - Started during the day towards end of the day.   - Feels like  a slight cough might be coming on.       Review of Systems   Constitutional, HEENT, cardiovascular, pulmonary, gi and gu systems are negative, except as otherwise noted.      Objective           Vitals:  No vitals were obtained today due to virtual visit.    Physical Exam   healthy, alert and no distress  PSYCH: Alert and oriented times 3; coherent speech, normal   rate and volume, able to articulate logical thoughts, able   to abstract reason, no tangential thoughts, no hallucinations   or delusions  Her affect is normal  RESP: No cough, no audible wheezing, able to talk in full sentences  Remainder of exam unable to be completed due to telephone visits        Phone call duration: 8:40 minutes    Daughter name: Aj.     [phone]

## 2022-09-16 NOTE — TELEPHONE ENCOUNTER
Sore throat started yesterday.  7/10 pain scale  Tylenol   Gargling salt water.  No sinus symptoms  Denies fever.  Per the protocol be seen in office today.  Caller stated she doesn't have transportation.  I then suggested telephone virtual visit.  Caller stated understanding and agreement.  Transferred to scheduling.      Reason for Disposition    SEVERE sore throat pain    Additional Information    Negative: SEVERE difficulty breathing (e.g., struggling for each breath, speaks in single words)    Negative: Sounds like a life-threatening emergency to the triager    Negative: Drooling or spitting out saliva (because can't swallow)    Negative: Unable to open mouth completely    Negative: Drinking very little and has signs of dehydration (e.g., no urine > 12 hours, very dry mouth, very lightheaded)    Negative: Patient sounds very sick or weak to the triager    Negative: Difficulty breathing (per caller) but not severe    Negative: Fever > 103 F (39.4 C)    Negative: Refuses to drink anything for > 12 hours    Protocols used: SORE THROAT-A-OH    Pallavi ZAPATA RN [facility]

## 2022-09-18 ENCOUNTER — LAB (OUTPATIENT)
Dept: FAMILY MEDICINE | Facility: CLINIC | Age: 62
End: 2022-09-18
Attending: PHYSICIAN ASSISTANT
Payer: MEDICARE

## 2022-09-18 DIAGNOSIS — J02.9 SORE THROAT: ICD-10-CM

## 2022-09-18 LAB
DEPRECATED S PYO AG THROAT QL EIA: NEGATIVE
GROUP A STREP BY PCR: NOT DETECTED

## 2022-09-18 PROCEDURE — U0003 INFECTIOUS AGENT DETECTION BY NUCLEIC ACID (DNA OR RNA); SEVERE ACUTE RESPIRATORY SYNDROME CORONAVIRUS 2 (SARS-COV-2) (CORONAVIRUS DISEASE [COVID-19]), AMPLIFIED PROBE TECHNIQUE, MAKING USE OF HIGH THROUGHPUT TECHNOLOGIES AS DESCRIBED BY CMS-2020-01-R: HCPCS

## 2022-09-18 PROCEDURE — U0005 INFEC AGEN DETEC AMPLI PROBE: HCPCS

## 2022-09-18 PROCEDURE — 87651 STREP A DNA AMP PROBE: CPT

## 2022-09-19 ENCOUNTER — TELEPHONE (OUTPATIENT)
Dept: FAMILY MEDICINE | Facility: CLINIC | Age: 62
End: 2022-09-19

## 2022-09-19 LAB — SARS-COV-2 RNA RESP QL NAA+PROBE: NEGATIVE

## 2022-09-19 NOTE — TELEPHONE ENCOUNTER
Pt was notified that her Covid and Strep tests were negative.  No worsening symptoms with her today.

## 2022-10-23 ENCOUNTER — HEALTH MAINTENANCE LETTER (OUTPATIENT)
Age: 62
End: 2022-10-23

## 2022-10-28 ENCOUNTER — OFFICE VISIT (OUTPATIENT)
Dept: FAMILY MEDICINE | Facility: CLINIC | Age: 62
End: 2022-10-28
Payer: MEDICARE

## 2022-10-28 ENCOUNTER — TELEPHONE (OUTPATIENT)
Dept: FAMILY MEDICINE | Facility: CLINIC | Age: 62
End: 2022-10-28

## 2022-10-28 VITALS
DIASTOLIC BLOOD PRESSURE: 80 MMHG | OXYGEN SATURATION: 99 % | HEIGHT: 65 IN | BODY MASS INDEX: 28.32 KG/M2 | HEART RATE: 76 BPM | SYSTOLIC BLOOD PRESSURE: 104 MMHG | WEIGHT: 170 LBS | RESPIRATION RATE: 20 BRPM | TEMPERATURE: 97.4 F

## 2022-10-28 DIAGNOSIS — R73.03 PREDIABETES: ICD-10-CM

## 2022-10-28 DIAGNOSIS — J45.909 UNCOMPLICATED ASTHMA, UNSPECIFIED ASTHMA SEVERITY, UNSPECIFIED WHETHER PERSISTENT: ICD-10-CM

## 2022-10-28 DIAGNOSIS — E78.5 HYPERLIPIDEMIA, UNSPECIFIED HYPERLIPIDEMIA TYPE: ICD-10-CM

## 2022-10-28 DIAGNOSIS — K13.0 ANGULAR CHEILITIS: ICD-10-CM

## 2022-10-28 DIAGNOSIS — E55.9 AVITAMINOSIS D: ICD-10-CM

## 2022-10-28 DIAGNOSIS — I10 PRIMARY HYPERTENSION: ICD-10-CM

## 2022-10-28 DIAGNOSIS — Z00.00 ENCOUNTER FOR MEDICARE ANNUAL WELLNESS EXAM: ICD-10-CM

## 2022-10-28 DIAGNOSIS — Z12.11 COLON CANCER SCREENING: ICD-10-CM

## 2022-10-28 DIAGNOSIS — Z23 HIGH PRIORITY FOR 2019-NCOV VACCINE: ICD-10-CM

## 2022-10-28 DIAGNOSIS — Z12.4 CERVICAL CANCER SCREENING: ICD-10-CM

## 2022-10-28 DIAGNOSIS — E03.9 HYPOTHYROIDISM, UNSPECIFIED TYPE: ICD-10-CM

## 2022-10-28 LAB
HBA1C MFR BLD: 5.6 % (ref 0–5.6)
T4 FREE SERPL-MCNC: 1.31 NG/DL (ref 0.76–1.46)
TSH SERPL DL<=0.005 MIU/L-ACNC: 0.23 MU/L (ref 0.4–4)

## 2022-10-28 PROCEDURE — 36415 COLL VENOUS BLD VENIPUNCTURE: CPT | Performed by: FAMILY MEDICINE

## 2022-10-28 PROCEDURE — 99396 PREV VISIT EST AGE 40-64: CPT | Performed by: FAMILY MEDICINE

## 2022-10-28 PROCEDURE — 90682 RIV4 VACC RECOMBINANT DNA IM: CPT | Performed by: FAMILY MEDICINE

## 2022-10-28 PROCEDURE — G0008 ADMIN INFLUENZA VIRUS VAC: HCPCS | Performed by: FAMILY MEDICINE

## 2022-10-28 PROCEDURE — 83036 HEMOGLOBIN GLYCOSYLATED A1C: CPT | Performed by: FAMILY MEDICINE

## 2022-10-28 PROCEDURE — 99214 OFFICE O/P EST MOD 30 MIN: CPT | Mod: 25 | Performed by: FAMILY MEDICINE

## 2022-10-28 PROCEDURE — 84443 ASSAY THYROID STIM HORMONE: CPT | Performed by: FAMILY MEDICINE

## 2022-10-28 PROCEDURE — 91312 COVID-19,PF,PFIZER BOOSTER BIVALENT: CPT | Performed by: FAMILY MEDICINE

## 2022-10-28 PROCEDURE — 84439 ASSAY OF FREE THYROXINE: CPT | Performed by: FAMILY MEDICINE

## 2022-10-28 PROCEDURE — 0124A COVID-19,PF,PFIZER BOOSTER BIVALENT: CPT | Performed by: FAMILY MEDICINE

## 2022-10-28 RX ORDER — FLUTICASONE PROPIONATE 110 UG/1
1 AEROSOL, METERED RESPIRATORY (INHALATION) 2 TIMES DAILY
Qty: 12 G | Refills: 4 | Status: SHIPPED | OUTPATIENT
Start: 2022-10-28 | End: 2022-10-28

## 2022-10-28 RX ORDER — ALBUTEROL SULFATE 90 UG/1
AEROSOL, METERED RESPIRATORY (INHALATION)
Qty: 54 G | Refills: 1 | Status: SHIPPED | OUTPATIENT
Start: 2022-10-28 | End: 2023-10-11

## 2022-10-28 RX ORDER — ERGOCALCIFEROL 1.25 MG/1
50000 CAPSULE, LIQUID FILLED ORAL WEEKLY
Qty: 12 CAPSULE | Refills: 0 | Status: SHIPPED | OUTPATIENT
Start: 2022-10-28 | End: 2022-11-01

## 2022-10-28 RX ORDER — MUPIROCIN 20 MG/G
OINTMENT TOPICAL 2 TIMES DAILY
Qty: 22 G | Refills: 0 | Status: SHIPPED | OUTPATIENT
Start: 2022-10-28 | End: 2022-11-11

## 2022-10-28 RX ORDER — FLUTICASONE PROPIONATE 110 UG/1
1 AEROSOL, METERED RESPIRATORY (INHALATION) 2 TIMES DAILY
Qty: 12 G | Refills: 4 | Status: SHIPPED | OUTPATIENT
Start: 2022-10-28

## 2022-10-28 RX ORDER — BENZOCAINE/MENTHOL 6 MG-10 MG
LOZENGE MUCOUS MEMBRANE 2 TIMES DAILY
Qty: 28 G | Refills: 0 | Status: SHIPPED | OUTPATIENT
Start: 2022-10-28 | End: 2022-11-11

## 2022-10-28 ASSESSMENT — ENCOUNTER SYMPTOMS
HEADACHES: 0
SHORTNESS OF BREATH: 0
ARTHRALGIAS: 0
CONSTIPATION: 0
DYSURIA: 0
HEMATOCHEZIA: 0
FEVER: 0
NERVOUS/ANXIOUS: 0
HEMATURIA: 0
DIARRHEA: 0
PARESTHESIAS: 0
ABDOMINAL PAIN: 0
FREQUENCY: 0
BREAST MASS: 0
SORE THROAT: 0
EYE PAIN: 0
HEARTBURN: 0
JOINT SWELLING: 0
DIZZINESS: 0
WEAKNESS: 0
NAUSEA: 0
CHILLS: 0
COUGH: 0
MYALGIAS: 0
PALPITATIONS: 0

## 2022-10-28 ASSESSMENT — ACTIVITIES OF DAILY LIVING (ADL): CURRENT_FUNCTION: NO ASSISTANCE NEEDED

## 2022-10-28 NOTE — PATIENT INSTRUCTIONS
Asthma - start Flovent twice daily    Lips - Use aqaphor on lips every few hours. Mix the two creams and apply to the corner of your lips twice daily for 14 days.         Patient Education   Personalized Prevention Plan  You are due for the preventive services outlined below.  Your care team is available to assist you in scheduling these services.  If you have already completed any of these items, please share that information with your care team to update in your medical record.  Health Maintenance Due   Topic Date Due    Hepatitis B Vaccine (1 of 3 - 3-dose series) Never done    Mammogram  06/02/2008    Zoster (Shingles) Vaccine (1 of 2) Never done    COVID-19 Vaccine (3 - Booster for Rehan series) 01/17/2022    Annual Wellness Visit  08/06/2022    ANNUAL REVIEW OF HM ORDERS  08/06/2022    Flu Vaccine (1) 09/01/2022    HPV Screening  12/27/2022    PAP Smear  12/27/2022       Exercise for a Healthier Heart  You may wonder how you can improve the health of your heart. If you re thinking about exercise, you re on the right track. You don t need to become an athlete. But you do need a certain amount of brisk exercise to help strengthen your heart. If you have been diagnosed with a heart condition, your healthcare provider may advise exercise to help stabilize your condition. To help make exercise a habit, choose safe, fun activities.      Exercise with a friend. When activity is fun, you're more likely to stick with it.   Before you start  Check with your healthcare provider before starting an exercise program. This is especially important if you have not been active for a while. It's also important if you have a long-term (chronic) health problem such as heart disease, diabetes, or obesity. Or if you are at high risk for having these problems.   Why exercise?  Exercising regularly offers many healthy rewards. It can help you do all of the following:   Improve your blood cholesterol level to help prevent further  heart trouble  Lower your blood pressure to help prevent a stroke or heart attack  Control diabetes, or reduce your risk of getting this disease  Improve your heart and lung function  Reach and stay at a healthy weight  Make your muscles stronger so you can stay active  Prevent falls and fractures by slowing the loss of bone mass (osteoporosis)  Manage stress better  Reduce your blood pressure  Improve your sense of self and your body image  Exercise tips    Ease into your routine. Set small goals. Then build on them. If you are not sure what your activity level should be, talk with your healthcare provider first before starting an exercise routine.  Exercise on most days. Aim for a total of 150 minutes (2 hours and 30 minutes) or more of moderate-intensity aerobic activity each week. Or 75 minutes (1 hour and 15 minutes) or more of vigorous-intensity aerobic activity each week. Or try for a combination of both. Moderate activity means that you breathe heavier and your heart rate increases but you can still talk. Think about doing 40 minutes of moderate exercise, 3 to 4 times a week. For best results, activity should last for about 40 minutes to lower blood pressure and cholesterol. It's OK to work up to the 40-minute period over time. Examples of moderate-intensity activity are walking 1 mile in 15 minutes. Or doing 30 to 45 minutes of yard work.  Step up your daily activity level.  Along with your exercise program, try being more active the whole day. Walk instead of drive. Or park further away so that you take more steps each day. Do more household tasks or yard work. You may not be able to meet the advised mount of physical activity. But doing some moderate- or vigorous-intensity aerobic activity can help reduce your risk for heart disease. Your healthcare provider can help you figure out what is best for you.  Choose 1 or more activities you enjoy.  Walking is one of the easiest things you can do. You can also  try swimming, riding a bike, dancing, or taking an exercise class.    When to call your healthcare provider  Call your healthcare provider if you have any of these:   Chest pain or feel dizzy or lightheaded  Burning, tightness, pressure, or heaviness in your chest, neck, shoulders, back, or arms  Abnormal shortness of breath  More joint or muscle pain  A very fast or irregular heartbeat (palpitations)  Nicolas last reviewed this educational content on 7/1/2019 2000-2021 [employer]. All rights reserved. This information is not intended as a substitute for professional medical care. Always follow your healthcare professional's instructions.

## 2022-10-28 NOTE — TELEPHONE ENCOUNTER
RECORDS RECEIVED FROM: Dyslipidemia; referred by Dr. Paul   DATE RECEIVED: 1/19/2023   NOTES (FOR ALL VISITS) STATUS DETAILS   OFFICE NOTES from referring provider [facility]:  8/10/22 - NEPH OV with Dr. Paul   OFFICE NOTES from other specialist [facility]:  10/28/22, 3/24/22 - PCC OV with Dr. Max   ED NOTES N/A    OPERATIVE REPORT  (thyroid, pituitary, adrenal, parathyroid) N/A    MEDICATION LIST Internal    IMAGING  N/A    LABS     DIABETES: HBGA1C, CREATININE, FASTING LIPIDS, MICROALBUMIN URINE, POTASSIUM, TSH, T4    THYROID: TSH, T4, CBC, THYRODLONULIN, TOTAL T3, FREE T4, CALCITONIN, CEA Internal   [facility]:  10/28/22 - HBGA1C  8/10/22 - CBC  8/10/22 - Lipid  8/10/22 - Parathyroid Hormone  8/10/22 - Routine UA  8/10/22 - Vitamin D  2/12/22 - TSH, T4  2/12/22 - BMP

## 2022-10-28 NOTE — PROGRESS NOTES
"   SUBJECTIVE:   CC: Dalton is an 62 year old who presents for preventive health visit.       Patient has been advised of split billing requirements and indicates understanding: Yes  Healthy Habits:     In general, how would you rate your overall health?  Good    Frequency of exercise:  None    Do you usually eat at least 4 servings of fruit and vegetables a day, include whole grains    & fiber and avoid regularly eating high fat or \"junk\" foods?  Yes    Taking medications regularly:  Yes    Medication side effects:  None    Ability to successfully perform activities of daily living:  No assistance needed    Home Safety:  No safety concerns identified    Hearing Impairment:  No hearing concerns    In the past 6 months, have you been bothered by leaking of urine?  No    In general, how would you rate your overall mental or emotional health?  Excellent      PHQ-2 Total Score: 0    Additional concerns today:  No    Asthma - she has been using her albuterol inhaler twice daily. Triggers are heat, dust, heavy scents. She experiences coughing and shortness of breath. Quit smoking 2001. She has dyspnea if she walks to far or over exerts herself.     Lips chapped along with cracking on the corner of the lips. She has use carmax, beeswax, chapstick, vaseline - which are not helpful. They peel and then get crusty.       Today's PHQ-2 Score:   PHQ-2 ( 1999 Pfizer) 10/28/2022   Q1: Little interest or pleasure in doing things 0   Q2: Feeling down, depressed or hopeless 0   PHQ-2 Score 0   PHQ-2 Total Score (12-17 Years)- Positive if 3 or more points; Administer PHQ-A if positive -   Q1: Little interest or pleasure in doing things Not at all   Q2: Feeling down, depressed or hopeless Not at all   PHQ-2 Score 0       Abuse: Current or Past (Physical, Sexual or Emotional) - No  Do you feel safe in your environment? Yes        Social History     Tobacco Use     Smoking status: Former     Packs/day: 0.50     Years: 7.00     Pack " years: 3.50     Types: Cigarettes     Quit date: 2001     Years since quittin.9     Smokeless tobacco: Never   Substance Use Topics     Alcohol use: Yes     Comment: 1 beer     If you drink alcohol do you typically have >3 drinks per day or >7 drinks per week? No    Alcohol Use 10/28/2022   Prescreen: >3 drinks/day or >7 drinks/week? Not Applicable   No flowsheet data found.    Reviewed orders with patient.  Reviewed health maintenance and updated orders accordingly - Yes      Breast Cancer Screening:    Breast CA Risk Assessment (FHS-7) 2021   Do you have a family history of breast, colon, or ovarian cancer? No / Unknown       Pertinent mammograms are reviewed under the imaging tab.    History of abnormal Pap smear: unsure     Reviewed and updated as needed this visit by clinical staff                  Reviewed and updated as needed this visit by Provider                     Review of Systems   Constitutional: Negative for chills and fever.   HENT: Negative for congestion, ear pain, hearing loss and sore throat.    Eyes: Negative for pain and visual disturbance.   Respiratory: Negative for cough and shortness of breath.    Cardiovascular: Negative for chest pain, palpitations and peripheral edema.   Gastrointestinal: Negative for abdominal pain, constipation, diarrhea, heartburn, hematochezia and nausea.   Breasts:  Negative for tenderness, breast mass and discharge.   Genitourinary: Negative for dysuria, frequency, genital sores, hematuria, pelvic pain, urgency, vaginal bleeding and vaginal discharge.   Musculoskeletal: Negative for arthralgias, joint swelling and myalgias.   Skin: Negative for rash.   Neurological: Negative for dizziness, weakness, headaches and paresthesias.   Psychiatric/Behavioral: Negative for mood changes. The patient is not nervous/anxious.           OBJECTIVE:   /80 (BP Location: Right arm, Patient Position: Sitting, Cuff Size: Adult Regular)   Pulse 76   Temp 97.4  F  "(36.3  C) (Temporal)   Resp 20   Ht 1.638 m (5' 4.5\")   Wt 77.1 kg (170 lb)   SpO2 99%   BMI 28.73 kg/m    Physical Exam  GENERAL APPEARANCE: healthy, alert and no distress  HENT: corner of lips cracked, lips chapped   NECK: no adenopathy, no asymmetry, masses, or scars and thyroid normal to palpation  RESP: lungs clear to auscultation - no rales, rhonchi or wheezes  CV: regular rate and rhythm, normal S1 S2  ABDOMEN: soft, nontender, no hepatosplenomegaly, no masses and bowel sounds normal  MS: no musculoskeletal defects are noted and gait is age appropriate without ataxia  SKIN: no suspicious lesions or rashes  NEURO: Normal strength and tone, sensory exam grossly normal, mentation intact and speech normal  PSYCH: mentation appears normal and affect normal/    Diagnostic Test Results:  Labs reviewed in Epic    ASSESSMENT/PLAN:     Encounter for Medicare annual wellness exam  - Received influenza and COVID vaccine.  - Shingles vaccine at next visit.   - Will schedule mammogram.     Uncomplicated asthma, unspecified asthma severity, unspecified whether persistent  - Not controlled, added Flovent daily. Follow up in one month.   - albuterol (PROAIR HFA/PROVENTIL HFA/VENTOLIN HFA) 108 (90 Base) MCG/ACT inhaler; INHALE 2 PUFFS BY MOUTH EVERY 6 HOURS AS NEEDED FOR SHORTNESS OF BREATH OR WHEEZING  Dispense: 54 g; Refill: 1  - fluticasone (FLOVENT HFA) 110 MCG/ACT inhaler; Inhale 1 puff into the lungs 2 times daily  Dispense: 12 g; Refill: 4    Avitaminosis D  - Reviewed recent labs. Recheck labs in 3 months after completing treatment.   - vitamin D2 (ERGOCALCIFEROL) 94015 units (1250 mcg) capsule; Take 1 capsule (50,000 Units) by mouth once a week for 12 doses  Dispense: 12 capsule; Refill: 0    Hypothyroidism, unspecified type  - TSH with free T4 reflex; Future  - TSH with free T4 reflex    Primary hypertension  - Albumin Random Urine Quantitative with Creat Ratio; Future  - Albumin Random Urine Quantitative with " "Creat Ratio; Future    Prediabetes  - Hemoglobin A1c; Future  - Hemoglobin A1c    Cervical cancer screening  - pap at next visit     High priority for 2019-nCoV vaccine  - COVID-19,PF,PFIZER BOOSTER BIVALENT 12+Yrs    Hyperlipidemia, unspecified hyperlipidemia type  - discussed medication options with Arsenio keane  - recommended to apply aqaphor to lipss  - mupirocin (BACTROBAN) 2 % external ointment; Apply topically 2 times daily for 14 days  Dispense: 22 g; Refill: 0  - hydrocortisone (CORTAID) 1 % external cream; Apply topically 2 times daily for 14 days  Dispense: 28 g; Refill: 0  - recheck in one month     Colon cancer screening  - cologard     Patient has been advised of split billing requirements and indicates understanding: Yes      COUNSELING:  Reviewed preventive health counseling, as reflected in patient instructions    Estimated body mass index is 30.69 kg/m  as calculated from the following:    Height as of 8/6/21: 1.626 m (5' 4\").    Weight as of 8/10/22: 81.1 kg (178 lb 12.8 oz).        She reports that she quit smoking about 20 years ago. She has a 3.50 pack-year smoking history. She has never used smokeless tobacco.      Counseling Resources:  ATP IV Guidelines  Pooled Cohorts Equation Calculator  Breast Cancer Risk Calculator  BRCA-Related Cancer Risk Assessment: FHS-7 Tool  FRAX Risk Assessment  ICSI Preventive Guidelines  Dietary Guidelines for Americans, 2010  USDA's MyPlate  ASA Prophylaxis  Lung CA Screening    Yani Max MD  [facility]    She is at risk for lack of exercise and has been provided with information to increase physical activity for the benefit of her well-being.  "

## 2022-10-28 NOTE — TELEPHONE ENCOUNTER
"PA started for vitamin D2 (ERGOCALCIFEROL) 75451 units (1250 mcg) capsule.  To submit the PA:  1. Go to [email or web address] and click Enter a key  2. Enter the pt's last name and date of birth and the hanna   Patient last name:José    :[birth year]   Key:[id number]  3. Complete the form and click \"Send to Plan\"    "

## 2022-10-28 NOTE — TELEPHONE ENCOUNTER
PT was expecting 2 creams for her lips.  Those were sent in while I talked to pt on the phone.  Pt says Siama didn't get the Flovent. I resent. That is strange, since we see it as going.    PT says the Vit D 50,000 is not covered by her insurance.  Can something else get sent in.    MARÍA ELENA Watters

## 2022-10-31 NOTE — TELEPHONE ENCOUNTER
Central Prior Authorization Team   Phone: [phone]    PA Initiation    Medication: vitamin D2 (ERGOCALCIFEROL) 94634 units (1250 mcg) capsule, rec 10-28-22  Insurance Company: [employer] - Phone [phone] Fax [phone]  Pharmacy Filling the Rx: [id number] [place] [address] AT [place] [address]  Filling Pharmacy Phone: [phone]  Filling Pharmacy Fax:    Start Date: 10/31/2022

## 2022-11-01 RX ORDER — ERGOCALCIFEROL 1.25 MG/1
50000 CAPSULE, LIQUID FILLED ORAL WEEKLY
Qty: 12 CAPSULE | Refills: 0 | Status: SHIPPED | OUTPATIENT
Start: 2022-11-01 | End: 2023-01-13

## 2022-11-01 RX ORDER — CHOLECALCIFEROL (VITAMIN D3) 50 MCG
1 TABLET ORAL DAILY
Qty: 90 TABLET | Refills: 3 | Status: SHIPPED | OUTPATIENT
Start: 2022-11-01 | End: 2022-11-01 | Stop reason: ALTCHOICE

## 2022-11-01 NOTE — TELEPHONE ENCOUNTER
Patient calling in. States [place] cancelled both the Vitamin D (once weekly and the daily)    The once weekly should not have been cancelled. I resent this in as was sent 10/28.    ALIN SharmaN RN  [facility]

## 2022-11-01 NOTE — TELEPHONE ENCOUNTER
Writer called patient who stated high dose Vitamin D has been paid for and mailed out to her-cost was $11.99.    Writer called [facility] and cancelled Vitamin D3 order sent by Dr. Max today, 11/1/22.    ALIN CarreraN, RN-[facility]

## 2022-11-02 RX ORDER — LEVOTHYROXINE SODIUM 137 UG/1
137 TABLET ORAL DAILY
Qty: 60 TABLET | Refills: 0 | Status: SHIPPED | OUTPATIENT
Start: 2022-11-02 | End: 2023-01-13

## 2022-11-02 NOTE — TELEPHONE ENCOUNTER
PRIOR AUTHORIZATION DENIED    Medication: vitamin D2 (ERGOCALCIFEROL) 58763 units (1250 mcg) capsule, rec 10-28-22    Denial Date: 11/2/2022    Denial Rational:           Appeal Information: Medication exclusions can not be appealed

## 2022-11-29 DIAGNOSIS — E78.1 HYPERTRIGLYCERIDEMIA: ICD-10-CM

## 2022-12-01 RX ORDER — FENOFIBRATE 48 MG/1
TABLET, COATED ORAL
Qty: 90 TABLET | Refills: 3 | OUTPATIENT
Start: 2022-12-01

## 2022-12-01 NOTE — TELEPHONE ENCOUNTER
Message sent to pharmacy - Refusal reason: Patient has requested refill too soon (ORDER SENT TO REQUESTING [facility] [id number] ON 3/24/22 FOR 1 YR SUPLY.).  Eusebio RING

## 2022-12-14 DIAGNOSIS — E55.9 AVITAMINOSIS D: ICD-10-CM

## 2022-12-15 RX ORDER — ERGOCALCIFEROL 1.25 MG/1
CAPSULE, LIQUID FILLED ORAL
Qty: 12 CAPSULE | Refills: 0 | OUTPATIENT
Start: 2022-12-15

## 2022-12-15 NOTE — TELEPHONE ENCOUNTER
Refused; 12 weeks sent 11/1/22  EZEQUIEL Michele RN  [facility]

## 2022-12-21 NOTE — TELEPHONE ENCOUNTER
RECORDS RECEIVED FROM: internal    DATE RECEIVED: 1.19.23   NOTES (FOR ALL VISITS) STATUS DETAILS   OFFICE NOTES from referring provider internal  - NEPH OV with Dr. Paul   OFFICE NOTES from other specialist  10/28/22, 3/24/22 - PCC OV with Dr. Max     MEDICATION LIST internal       LABS     DIABETES: HBGA1C, CREATININE, FASTING LIPIDS, MICROALBUMIN URINE, POTASSIUM, TSH, T4    THYROID: TSH, T4, CBC, THYRODLONULIN, TOTAL T3, FREE T4, CALCITONIN, CEA internal

## 2023-01-10 DIAGNOSIS — E03.9 HYPOTHYROIDISM, UNSPECIFIED TYPE: ICD-10-CM

## 2023-01-10 DIAGNOSIS — E55.9 AVITAMINOSIS D: ICD-10-CM

## 2023-01-12 NOTE — TELEPHONE ENCOUNTER
Routing refill request to provider for review/approval because:  -- Vitamin D2: Drug not on the [facility] refill protocol   -- Levothyroxine: Labs out of range:  TSH  -- 2 Rx for levo in MAR.     Thyroid Protocol Failed      Normal TSH on file in past 12 months      Recent Labs   Lab Test 10/28/22  0910   TSH 0.23*     Last Written Prescription Date:  11/2/2022  Last Fill Quantity: 60,  # refills: 0   Last office visit: 10/28/2022 with prescribing provider:  Winter Patel Office Visit:  None with PCP    ALIN LynnN RN  [facility]

## 2023-01-13 RX ORDER — LEVOTHYROXINE SODIUM 137 UG/1
TABLET ORAL
Qty: 30 TABLET | Refills: 0 | Status: SHIPPED | OUTPATIENT
Start: 2023-01-13 | End: 2023-02-24

## 2023-01-13 RX ORDER — ERGOCALCIFEROL 1.25 MG/1
CAPSULE, LIQUID FILLED ORAL
Qty: 4 CAPSULE | Refills: 0 | Status: SHIPPED | OUTPATIENT
Start: 2023-01-13

## 2023-01-13 NOTE — TELEPHONE ENCOUNTER
Refilled X 1 month  Needs labs to recheck thyroid and Vitamin D. She can do labs on 1/19/23 at her endocrinology appointment  DM

## 2023-01-19 ENCOUNTER — PRE VISIT (OUTPATIENT)
Dept: ENDOCRINOLOGY | Facility: CLINIC | Age: 63
End: 2023-01-19

## 2023-01-30 ENCOUNTER — TRANSFERRED RECORDS (OUTPATIENT)
Dept: MULTI SPECIALTY CLINIC | Facility: CLINIC | Age: 63
End: 2023-01-30

## 2023-02-20 DIAGNOSIS — E03.9 HYPOTHYROIDISM, UNSPECIFIED TYPE: ICD-10-CM

## 2023-02-23 NOTE — TELEPHONE ENCOUNTER
Routing refill request to provider for review/approval because:  Labs out of range:  TSH  Appears dose was changed in Jnuary but no TSH recheck yet    Last Written Prescription Date:  1/13/23  Last Fill Quantity: 30,  # refills: 0   Last office visit: 10/28/2022 with prescribing provider:     Future Office Visit:

## 2023-02-24 RX ORDER — LEVOTHYROXINE SODIUM 137 UG/1
TABLET ORAL
Qty: 30 TABLET | Refills: 0 | Status: SHIPPED | OUTPATIENT
Start: 2023-02-24 | End: 2023-03-29

## 2023-03-20 ENCOUNTER — TELEPHONE (OUTPATIENT)
Dept: FAMILY MEDICINE | Facility: CLINIC | Age: 63
End: 2023-03-20

## 2023-03-20 DIAGNOSIS — E03.9 HYPOTHYROIDISM, UNSPECIFIED TYPE: ICD-10-CM

## 2023-03-20 RX ORDER — LEVOTHYROXINE SODIUM 137 UG/1
137 TABLET ORAL DAILY
Qty: 30 TABLET | Refills: 0 | OUTPATIENT
Start: 2023-03-20

## 2023-03-20 NOTE — TELEPHONE ENCOUNTER
Called patient to schedule Lab only appointment to ensure patient is on correct Thyroid dose.  Rahel Vigil RN  [facility]

## 2023-03-25 ENCOUNTER — LAB (OUTPATIENT)
Dept: LAB | Facility: CLINIC | Age: 63
End: 2023-03-25
Payer: MEDICARE

## 2023-03-25 DIAGNOSIS — E03.9 HYPOTHYROIDISM, UNSPECIFIED TYPE: ICD-10-CM

## 2023-03-25 LAB — TSH SERPL DL<=0.005 MIU/L-ACNC: 2.68 UIU/ML (ref 0.3–4.2)

## 2023-03-25 PROCEDURE — 84443 ASSAY THYROID STIM HORMONE: CPT

## 2023-03-25 PROCEDURE — 36415 COLL VENOUS BLD VENIPUNCTURE: CPT

## 2023-03-29 DIAGNOSIS — E03.9 HYPOTHYROIDISM, UNSPECIFIED TYPE: ICD-10-CM

## 2023-03-29 RX ORDER — LEVOTHYROXINE SODIUM 137 UG/1
137 TABLET ORAL DAILY
Qty: 90 TABLET | Refills: 3 | Status: SHIPPED | OUTPATIENT
Start: 2023-03-29 | End: 2024-01-10

## 2023-04-28 DIAGNOSIS — E78.1 HYPERTRIGLYCERIDEMIA: ICD-10-CM

## 2023-05-01 RX ORDER — ATORVASTATIN CALCIUM 80 MG/1
TABLET, FILM COATED ORAL
Qty: 90 TABLET | Refills: 1 | Status: SHIPPED | OUTPATIENT
Start: 2023-05-01 | End: 2023-12-07

## 2023-05-01 NOTE — TELEPHONE ENCOUNTER
Prescription approved per [facility] Refill Protocol.    Selena Thomas, BSN RN  [facility]

## 2023-09-28 ENCOUNTER — PATIENT OUTREACH (OUTPATIENT)
Dept: CARE COORDINATION | Facility: CLINIC | Age: 63
End: 2023-09-28
Payer: MEDICARE

## 2023-10-12 ENCOUNTER — PATIENT OUTREACH (OUTPATIENT)
Dept: CARE COORDINATION | Facility: CLINIC | Age: 63
End: 2023-10-12
Payer: MEDICARE

## 2023-11-03 DIAGNOSIS — F31.9 BIPOLAR I DISORDER (H): ICD-10-CM

## 2023-11-03 DIAGNOSIS — F31.76 BIPOLAR AFFECTIVE DISORDER, DEPRESSED, IN FULL REMISSION (H): Primary | ICD-10-CM

## 2023-11-11 ENCOUNTER — HEALTH MAINTENANCE LETTER (OUTPATIENT)
Age: 63
End: 2023-11-11

## 2023-12-01 DIAGNOSIS — E78.1 HYPERTRIGLYCERIDEMIA: ICD-10-CM

## 2023-12-07 DIAGNOSIS — E78.1 HYPERTRIGLYCERIDEMIA: ICD-10-CM

## 2023-12-07 RX ORDER — FENOFIBRATE 48 MG/1
TABLET, COATED ORAL
Qty: 90 TABLET | Refills: 1 | OUTPATIENT
Start: 2023-12-07

## 2023-12-07 RX ORDER — ATORVASTATIN CALCIUM 80 MG/1
TABLET, FILM COATED ORAL
Qty: 90 TABLET | Refills: 1 | OUTPATIENT
Start: 2023-12-07

## 2023-12-08 RX ORDER — ATORVASTATIN CALCIUM 80 MG/1
TABLET, FILM COATED ORAL
Qty: 90 TABLET | Refills: 0 | Status: SHIPPED | OUTPATIENT
Start: 2023-12-08 | End: 2024-01-10

## 2024-01-10 ENCOUNTER — OFFICE VISIT (OUTPATIENT)
Dept: FAMILY MEDICINE | Facility: CLINIC | Age: 64
End: 2024-01-10
Payer: MEDICARE

## 2024-01-10 VITALS
BODY MASS INDEX: 32.96 KG/M2 | HEIGHT: 63 IN | RESPIRATION RATE: 15 BRPM | HEART RATE: 87 BPM | SYSTOLIC BLOOD PRESSURE: 120 MMHG | WEIGHT: 186 LBS | DIASTOLIC BLOOD PRESSURE: 90 MMHG | TEMPERATURE: 97 F | OXYGEN SATURATION: 98 %

## 2024-01-10 DIAGNOSIS — E03.9 HYPOTHYROIDISM, UNSPECIFIED TYPE: ICD-10-CM

## 2024-01-10 DIAGNOSIS — L30.9 DERMATITIS: ICD-10-CM

## 2024-01-10 DIAGNOSIS — Z12.31 ENCOUNTER FOR SCREENING MAMMOGRAM FOR BREAST CANCER: ICD-10-CM

## 2024-01-10 DIAGNOSIS — Z12.11 COLON CANCER SCREENING: ICD-10-CM

## 2024-01-10 DIAGNOSIS — J45.909 UNCOMPLICATED ASTHMA, UNSPECIFIED ASTHMA SEVERITY, UNSPECIFIED WHETHER PERSISTENT: ICD-10-CM

## 2024-01-10 DIAGNOSIS — Z00.00 ENCOUNTER FOR MEDICARE ANNUAL WELLNESS EXAM: ICD-10-CM

## 2024-01-10 DIAGNOSIS — E78.1 HYPERTRIGLYCERIDEMIA: ICD-10-CM

## 2024-01-10 DIAGNOSIS — I10 PRIMARY HYPERTENSION: ICD-10-CM

## 2024-01-10 DIAGNOSIS — R73.03 PREDIABETES: ICD-10-CM

## 2024-01-10 DIAGNOSIS — Z29.11 NEED FOR VACCINATION AGAINST RESPIRATORY SYNCYTIAL VIRUS: ICD-10-CM

## 2024-01-10 DIAGNOSIS — Z23 HIGH PRIORITY FOR 2019-NCOV VACCINE: ICD-10-CM

## 2024-01-10 DIAGNOSIS — Z23 NEED FOR SHINGLES VACCINE: ICD-10-CM

## 2024-01-10 DIAGNOSIS — F31.9 BIPOLAR I DISORDER (H): ICD-10-CM

## 2024-01-10 DIAGNOSIS — N18.31 STAGE 3A CHRONIC KIDNEY DISEASE (H): ICD-10-CM

## 2024-01-10 PROCEDURE — 90480 ADMN SARSCOV2 VAC 1/ONLY CMP: CPT | Performed by: FAMILY MEDICINE

## 2024-01-10 PROCEDURE — G0439 PPPS, SUBSEQ VISIT: HCPCS | Performed by: FAMILY MEDICINE

## 2024-01-10 PROCEDURE — 91320 SARSCV2 VAC 30MCG TRS-SUC IM: CPT | Performed by: FAMILY MEDICINE

## 2024-01-10 PROCEDURE — 99214 OFFICE O/P EST MOD 30 MIN: CPT | Mod: 25 | Performed by: FAMILY MEDICINE

## 2024-01-10 RX ORDER — ALBUTEROL SULFATE 90 UG/1
AEROSOL, METERED RESPIRATORY (INHALATION)
Qty: 54 G | Refills: 11 | Status: SHIPPED | OUTPATIENT
Start: 2024-01-10

## 2024-01-10 RX ORDER — TRIAMCINOLONE ACETONIDE 1 MG/G
CREAM TOPICAL 2 TIMES DAILY
Qty: 45 G | Refills: 0 | Status: SHIPPED | OUTPATIENT
Start: 2024-01-10 | End: 2024-01-24

## 2024-01-10 RX ORDER — FLUTICASONE PROPIONATE 220 UG/1
1 AEROSOL, METERED RESPIRATORY (INHALATION) 2 TIMES DAILY
Qty: 12 G | Refills: 11 | Status: SHIPPED | OUTPATIENT
Start: 2024-01-10 | End: 2024-07-09

## 2024-01-10 RX ORDER — LEVOTHYROXINE SODIUM 137 UG/1
137 TABLET ORAL DAILY
Qty: 90 TABLET | Refills: 3 | Status: SHIPPED | OUTPATIENT
Start: 2024-01-10 | End: 2024-07-29

## 2024-01-10 RX ORDER — ATORVASTATIN CALCIUM 80 MG/1
TABLET, FILM COATED ORAL
Qty: 90 TABLET | Refills: 3 | Status: SHIPPED | OUTPATIENT
Start: 2024-01-10

## 2024-01-10 RX ORDER — FLUTICASONE PROPIONATE 110 UG/1
1 AEROSOL, METERED RESPIRATORY (INHALATION) 2 TIMES DAILY
Qty: 12 G | Refills: 11 | Status: CANCELLED | OUTPATIENT
Start: 2024-01-10

## 2024-01-10 RX ORDER — LISINOPRIL 2.5 MG/1
2.5 TABLET ORAL DAILY
Qty: 90 TABLET | Refills: 3 | Status: SHIPPED | OUTPATIENT
Start: 2024-01-10

## 2024-01-10 RX ORDER — FENOFIBRATE 48 MG/1
TABLET, COATED ORAL
Qty: 90 TABLET | Refills: 3 | Status: SHIPPED | OUTPATIENT
Start: 2024-01-10 | End: 2024-09-09 | Stop reason: DRUGHIGH

## 2024-01-10 ASSESSMENT — ENCOUNTER SYMPTOMS
HEARTBURN: 0
ABDOMINAL PAIN: 0
JOINT SWELLING: 0
DYSURIA: 0
BREAST MASS: 0
CONSTIPATION: 0
SHORTNESS OF BREATH: 1
HEADACHES: 0
WEAKNESS: 0
DIZZINESS: 0
PARESTHESIAS: 0
ARTHRALGIAS: 0
EYE PAIN: 0
NAUSEA: 0
HEMATOCHEZIA: 0
PALPITATIONS: 0
NERVOUS/ANXIOUS: 0
FEVER: 0
CHILLS: 0
SORE THROAT: 0
MYALGIAS: 0
DIARRHEA: 0
FREQUENCY: 0
HEMATURIA: 0
COUGH: 0

## 2024-01-10 ASSESSMENT — ASTHMA QUESTIONNAIRES
ACT_TOTALSCORE: 19
ACT_TOTALSCORE: 19
QUESTION_5 LAST FOUR WEEKS HOW WOULD YOU RATE YOUR ASTHMA CONTROL: WELL CONTROLLED
QUESTION_1 LAST FOUR WEEKS HOW MUCH OF THE TIME DID YOUR ASTHMA KEEP YOU FROM GETTING AS MUCH DONE AT WORK, SCHOOL OR AT HOME: A LITTLE OF THE TIME
QUESTION_2 LAST FOUR WEEKS HOW OFTEN HAVE YOU HAD SHORTNESS OF BREATH: ONCE OR TWICE A WEEK
QUESTION_4 LAST FOUR WEEKS HOW OFTEN HAVE YOU USED YOUR RESCUE INHALER OR NEBULIZER MEDICATION (SUCH AS ALBUTEROL): ONE OR TWO TIMES PER DAY
QUESTION_3 LAST FOUR WEEKS HOW OFTEN DID YOUR ASTHMA SYMPTOMS (WHEEZING, COUGHING, SHORTNESS OF BREATH, CHEST TIGHTNESS OR PAIN) WAKE YOU UP AT NIGHT OR EARLIER THAN USUAL IN THE MORNING: NOT AT ALL

## 2024-01-10 ASSESSMENT — ACTIVITIES OF DAILY LIVING (ADL)
CURRENT_FUNCTION: SHOPPING REQUIRES ASSISTANCE
CURRENT_FUNCTION: TRANSPORTATION REQUIRES ASSISTANCE
CURRENT_FUNCTION: LAUNDRY REQUIRES ASSISTANCE

## 2024-01-10 ASSESSMENT — PAIN SCALES - GENERAL: PAINLEVEL: NO PAIN (0)

## 2024-01-10 NOTE — PATIENT INSTRUCTIONS
"Asthma -   Stop current Flovent inhaler and start the new higher dose    Skin -   Apply a small amount twice daily of steroid cream for 2-3 weeks   Apply Aquaphor on top       Patient Education   Personalized Prevention Plan  You are due for the preventive services outlined below.  Your care team is available to assist you in scheduling these services.  If you have already completed any of these items, please share that information with your care team to update in your medical record.  Health Maintenance Due   Topic Date Due    Asthma Action Plan - yearly  Never done    Zoster (Shingles) Vaccine (1 of 2) Never done    LUNG CANCER SCREENING  Never done    Mammogram  03/09/2017    RSV VACCINE (Pregnancy & 60+) (1 - 1-dose 60+ series) Never done    Kidney Microalbumin Urine Test  11/13/2022    HPV Screening  12/27/2022    PAP Smear  12/27/2022    Basic Metabolic Panel  08/10/2023    Cholesterol Lab  08/10/2023    COVID-19 Vaccine (4 - 2023-24 season) 09/01/2023    Annual Wellness Visit  10/28/2023    ANNUAL REVIEW OF HM ORDERS  10/28/2023    Hemoglobin  08/10/2023     Learning About Being Physically Active  What is physical activity?     Being physically active means doing any kind of activity that gets your body moving.  The types of physical activity that can help you get fit and stay healthy include:  Aerobic or \"cardio\" activities. These make your heart beat faster and make you breathe harder, such as brisk walking, riding a bike, or running. They strengthen your heart and lungs and build up your endurance.  Strength training activities. These make your muscles work against, or \"resist,\" something. Examples include lifting weights or doing push-ups. These activities help tone and strengthen your muscles and bones.  Stretches. These let you move your joints and muscles through their full range of motion. Stretching helps you be more flexible.  Reaching a balance between these three types of physical activity is " "important because each one contributes to your overall fitness.  What are the benefits of being active?  Being active is one of the best things you can do for your health. It helps you to:  Feel stronger and have more energy to do all the things you like to do.  Focus better at school or work.  Feel, think, and sleep better.  Reach and stay at a healthy weight.  Lose fat and build lean muscle.  Lower your risk for serious health problems, including diabetes, heart attack, high blood pressure, and some cancers.  Keep your heart, lungs, bones, muscles, and joints strong and healthy.  How can you make being active part of your life?  Start slowly. Make it your long-term goal to get at least 30 minutes of exercise on most days of the week. Walking is a good choice. You also may want to do other activities, such as running, swimming, cycling, or playing tennis or team sports.  Pick activities that you like--ones that make your heart beat faster, your muscles stronger, and your muscles and joints more flexible. If you find more than one thing you like doing, do them all. You don't have to do the same thing every day.  Get your heart pumping every day. Any activity that makes your heart beat faster and keeps it at that rate for a while counts.  Here are some great ways to get your heart beating faster:  Go for a brisk walk, run, or hike.  Go for a swim or bike ride.  Take an online exercise class or dance.  Play a game of touch football, basketball, or soccer.  Play tennis, pickleball, or racquetball.  Climb stairs.  Even some household chores can be aerobic. Just do them at a faster pace. Raking or mowing the lawn, sweeping the garage, and vacuuming and cleaning your home all can help get your heart rate up.  Strengthen your muscles during the week. You don't have to lift heavy weights or grow big, bulky muscles to get stronger. Doing a few simple activities that make your muscles work against, or \"resist,\" something can " "help you get stronger. Aim for at least twice a week.  For example, you can:  Do push-ups or sit-ups, which use your own body weight as resistance.  Lift weights or dumbbells or use stretch bands at home or in a gym or community center.  Stretch your muscles often. Stretching will help you as you become more active. It can help you stay flexible and loosen tight muscles. It can also help improve your balance and posture and can be a great way to relax.  Be sure to stretch the muscles you'll be using when you work out. It's best to warm your muscles slightly before you stretch them. Walk or do some other light aerobic activity for a few minutes. Then start stretching.  When you stretch your muscles:  Do it slowly. Stretching is not about going fast or making sudden movements.  Don't push or bounce during a stretch.  Hold each stretch for at least 15 to 30 seconds, if you can. You should feel a stretch in the muscle, but not pain.  Breathe out as you do the stretch. Then breathe in as you hold the stretch. Don't hold your breath.  If you're worried about how more activity might affect your health, have a checkup before you start. Follow any special advice your doctor gives you for getting a smart start.  Where can you learn more?  Go to https://www.[employer].net/patiented  Enter W332 in the search box to learn more about \"Learning About Being Physically Active.\"  Current as of: June 6, 2023               Content Version: 13.8    2671-9187 [employer].   Care instructions adapted under license by your healthcare professional. If you have questions about a medical condition or this instruction, always ask your healthcare professional. [employer] disclaims any warranty or liability for your use of this information.      Activities of Daily Living    Your Health Risk Assessment indicates you have difficulties with activities of daily living such as housework, bathing, preparing meals, taking " medication, etc. Please make a follow up appointment for us to address this issue in more detail.

## 2024-01-10 NOTE — PROGRESS NOTES
"SUBJECTIVE:   Dalton is a 63 year old, presenting for the following:  Annual Visit (AWV / Physical )        1/10/2024    11:20 AM   Additional Questions   Roomed by Alyson Ellis   Accompanied by Anastasiia VÁSQUEZ worker       Are you in the first 12 months of your Medicare coverage?  No    Healthy Habits:     In general, how would you rate your overall health?  Good    Frequency of exercise:  1 day/week    Duration of exercise:  Less than 15 minutes    Do you usually eat at least 4 servings of fruit and vegetables a day, include whole grains    & fiber and avoid regularly eating high fat or \"junk\" foods?  Yes    Taking medications regularly:  Yes    Medication side effects:  None    Ability to successfully perform activities of daily living:  Transportation requires assistance, shopping requires assistance and laundry requires assistance    Home Safety:  No safety concerns identified    Hearing Impairment:  No hearing concerns    In the past 6 months, have you been bothered by leaking of urine?  No    In general, how would you rate your overall mental or emotional health?  Good    Additional concerns today:  Yes    Right leg rash - Rash has been present since June 2023. It started out as little bubbles. It went away and then it came back. Rash is itchy. No pain. No new contacts in that area. She doesn't have it anywhere else. She has put body wash and put lotion on it. She moisturizes skin once daily.     Skin on the bottom of her left foot cracked. She soaks it and puts bandaid, it seems to get better and then it cracks again.     Low back pain - doesn't want any pain medication. Back pain for three weeks. She is interested in exercises. Pain is intermittent, aching, 7/10, aggravated by bending over and no relieving factors. No recent falls. She almost fell over a case of water backwards and caught herself on the sink. She thinks that is when the back pain started. No urinary retention, bowel incontinence or saddle " anesthesia. She take occasional tylenol and aleve which help.     She feels that asthma is controlled and doesn't use albuterol much.     Today's PHQ-2 Score:       1/10/2024    11:10 AM   PHQ-2 (  [employer])   Q1: Little interest or pleasure in doing things 0   Q2: Feeling down, depressed or hopeless 0   PHQ-2 Score 0   Q1: Little interest or pleasure in doing things Not at all   Q2: Feeling down, depressed or hopeless Not at all   PHQ-2 Score 0           Have you ever done Advance Care Planning? (For example, a Health Directive, POLST, or a discussion with a medical provider or your loved ones about your wishes): No, advance care planning information given to patient to review.  Patient declined advance care planning discussion at this time.       Fall risk       Cognitive Screening   1) Repeat 3 items (Leader, Season, Table)    2) Clock draw: Can not do due to blindness   3) 3 item recall: Recalls 3 objects  Results: 3 items recalled: COGNITIVE IMPAIRMENT LESS LIKELY    Mini-CogTM Copyright TRISH Armijo. Licensed by the author for use in [facility]; reprinted with permission (soob@[facility]). All rights reserved.          Reviewed and updated as needed this visit by clinical staff   Tobacco  Allergies  Meds              Reviewed and updated as needed this visit by Provider                 Social History     Tobacco Use    Smoking status: Former     Packs/day: 0.50     Years: 7.00     Additional pack years: 0.00     Total pack years: 3.50     Types: Cigarettes     Quit date: 2001     Years since quittin.1    Smokeless tobacco: Never   Substance Use Topics    Alcohol use: Yes     Comment: 1 beer             1/10/2024    11:10 AM   Alcohol Use   Prescreen: >3 drinks/day or >7 drinks/week? Not Applicable          No data to display              Do you have a current opioid prescription? No  Do you use any other controlled substances or medications that are not prescribed by a provider?  None      Current providers sharing in care for this patient include:   Patient Care Team:  Yani Max MD as PCP - General (Family Medicine)  Roya Holland MD as Referring Physician (Family Practice)  Gracie Maravilla MD as MD (Internal Medicine)  Navjot Hernandez MD as MD (Ophthalmology)  Yani Max MD as Assigned PCP  Kamran Graham MD as MD (Nephrology)  Thao Messina MD as Fellow (Student in organized health care education/training program)  Gordon Paul MD as Assigned Nephrology Provider    The following health maintenance items are reviewed in Epic and correct as of today:  Health Maintenance   Topic Date Due    ASTHMA ACTION PLAN  Never done    ZOSTER IMMUNIZATION (1 of 2) Never done    LUNG CANCER SCREENING  Never done    MAMMO SCREENING  03/09/2017    RSV VACCINE (Pregnancy & 60+) (1 - 1-dose 60+ series) Never done    MICROALBUMIN  11/13/2022    HPV TEST  12/27/2022    PAP  12/27/2022    BMP  08/10/2023    LIPID  08/10/2023    COVID-19 Vaccine (4 - 2023-24 season) 09/01/2023    MEDICARE ANNUAL WELLNESS VISIT  10/28/2023    ANNUAL REVIEW OF HM ORDERS  10/28/2023    HEMOGLOBIN  08/10/2023    TSH W/FREE T4 REFLEX  03/25/2024    COLORECTAL CANCER SCREENING  06/06/2024    ASTHMA CONTROL TEST  07/10/2024    DTAP/TDAP/TD IMMUNIZATION (3 - Td or Tdap) 04/27/2026    ADVANCE CARE PLANNING  08/09/2026    HEPATITIS C SCREENING  Completed    HIV SCREENING  Completed    PHQ-2 (once per calendar year)  Completed    INFLUENZA VACCINE  Completed    URINALYSIS  Completed    Pneumococcal Vaccine: Pediatrics (0 to 5 Years) and At-Risk Patients (6 to 64 Years)  Aged Out    IPV IMMUNIZATION  Aged Out    HPV IMMUNIZATION  Aged Out    MENINGITIS IMMUNIZATION  Aged Out    RSV MONOCLONAL ANTIBODY  Aged Out             8/6/2021     2:32 PM   Breast CA Risk Assessment (FHS-7)   Do you have a family history of breast, colon, or ovarian cancer? No / Unknown           Pertinent mammograms  "are reviewed under the imaging tab.    Review of Systems   Constitutional:  Negative for chills and fever.   HENT:  Negative for congestion, ear pain, hearing loss and sore throat.    Eyes:  Negative for pain and visual disturbance.   Respiratory:  Positive for shortness of breath. Negative for cough.    Cardiovascular:  Negative for chest pain, palpitations and peripheral edema.   Gastrointestinal:  Negative for abdominal pain, constipation, diarrhea, heartburn, hematochezia and nausea.   Breasts:  Negative for tenderness, breast mass and discharge.   Genitourinary:  Negative for dysuria, frequency, genital sores, hematuria, pelvic pain, urgency, vaginal bleeding and vaginal discharge.   Musculoskeletal:  Negative for arthralgias, joint swelling and myalgias.   Skin:  Positive for rash.   Neurological:  Negative for dizziness, weakness, headaches and paresthesias.   Psychiatric/Behavioral:  Negative for mood changes. The patient is not nervous/anxious.          OBJECTIVE:   BP (!) 120/90   Pulse 87   Temp 97  F (36.1  C) (Temporal)   Resp 15   Ht 1.61 m (5' 3.39\")   Wt 84.4 kg (186 lb)   SpO2 98%   BMI 32.55 kg/m     Physical Exam  GENERAL APPEARANCE: healthy, alert and no distress  HENT: ear canals and TM's normal  NECK: no adenopathy, no asymmetry, masses, or scars and thyroid normal to palpation  RESP: lungs clear to auscultation - no rales, rhonchi or wheezes  BREAST: normal without masses, tenderness or nipple discharge and no palpable axillary masses or adenopathy  CV: regular rate and rhythm, normal S1 S2  ABDOMEN: soft, nontender, no hepatosplenomegaly, no masses and bowel sounds normal  MS: no musculoskeletal defects are noted and gait is age appropriate without ataxia  SKIN: back of right leg with mild erythema papules, left great toe with cracking skin on the side   NEURO: Normal strength and tone, sensory exam grossly normal, mentation intact and speech normal  PSYCH: mentation appears normal and " affect normal    Diagnostic Test Results:  Labs reviewed in Epic    ASSESSMENT / PLAN:     Encounter for Medicare annual wellness exam  - declined dermatology     Bipolar I disorder   - followed by psychiatrist     Need for shingles vaccine    Need for vaccination against respiratory syncytial virus    Uncomplicated asthma, unspecified asthma severity, unspecified whether persistent  - not controlled  - increased Flovent dose, follow up scheduled next month   - albuterol (PROAIR HFA/PROVENTIL HFA/VENTOLIN HFA) 108 (90 Base) MCG/ACT inhaler; INHALE 2 PUFFS BY MOUTH EVERY 6 HOURS AS NEEDED FOR SHORTNESS OF BREATH OR WHEEZING  Dispense: 54 g; Refill: 11  - fluticasone (FLOVENT HFA) 220 MCG/ACT inhaler; Inhale 1 puff into the lungs 2 times daily  Dispense: 12 g; Refill: 11    Hypertriglyceridemia  - atorvastatin (LIPITOR) 80 MG tablet; TAKE 1 TABLET(80 MG) BY MOUTH DAILY  Dispense: 90 tablet; Refill: 3  - fenofibrate (TRICOR) 48 MG tablet; TAKE 1 TABLET(48 MG) BY MOUTH DAILY  Dispense: 90 tablet; Refill: 3  - Lipid panel reflex to direct LDL Fasting; Future  - Lipid panel reflex to direct LDL Fasting    Hypothyroidism, unspecified type  - levothyroxine (SYNTHROID/LEVOTHROID) 137 MCG tablet; Take 1 tablet (137 mcg) by mouth daily  Dispense: 90 tablet; Refill: 3  - TSH with free T4 reflex; Future  - TSH with free T4 reflex    Primary hypertension  Stage 3a chronic kidney disease   - lisinopril (ZESTRIL) 2.5 MG tablet; Take 1 tablet (2.5 mg) by mouth daily  Dispense: 90 tablet; Refill: 3  - CBC with platelets; Future  - Comprehensive metabolic panel (BMP + Alb, Alk Phos, ALT, AST, Total. Bili, TP); Future  - Albumin Random Urine Quantitative with Creat Ratio; Future  - CBC with platelets  - Comprehensive metabolic panel (BMP + Alb, Alk Phos, ALT, AST, Total. Bili, TP)  - Albumin Random Urine Quantitative with Creat Ratio    Prediabetes  - Hemoglobin A1c; Future  - Hemoglobin A1c    Encounter for screening mammogram for  breast cancer  - MA Screen Bilateral w/Anjum; Future    Colon cancer screening  - Fecal colorectal cancer screen (FIT); Future  - Fecal colorectal cancer screen (FIT)    Dermatitis  - advised below   Apply a small amount twice daily of steroid cream for 2-3 weeks   Apply Aquaphor on top   - triamcinolone (KENALOG) 0.1 % external cream; Apply topically 2 times daily for 14 days  Dispense: 45 g; Refill: 0    High priority for 2019-nCoV vaccine    Patient has been advised of split billing requirements and indicates understanding: Yes      COUNSELING:  Reviewed preventive health counseling, as reflected in patient instructions        She reports that she quit smoking about 22 years ago. Her smoking use included cigarettes. She has a 3.5 pack-year smoking history. She has never used smokeless tobacco.      Appropriate preventive services were discussed with this patient, including applicable screening as appropriate for fall prevention, nutrition, physical activity, Tobacco-use cessation, weight loss and cognition.  Checklist reviewing preventive services available has been given to the patient.    Reviewed patients plan of care and provided an AVS. The Basic Care Plan (routine screening as documented in Health Maintenance) for Dalton meets the Care Plan requirement. This Care Plan has been established and reviewed with the Patient.          Yani Max MD  [facility]

## 2024-01-16 ENCOUNTER — PATIENT OUTREACH (OUTPATIENT)
Dept: GASTROENTEROLOGY | Facility: CLINIC | Age: 64
End: 2024-01-16
Payer: MEDICARE

## 2024-01-16 DIAGNOSIS — Z12.11 SPECIAL SCREENING FOR MALIGNANT NEOPLASMS, COLON: Primary | ICD-10-CM

## 2024-01-16 NOTE — PROGRESS NOTES
"CRC Screening Colonoscopy Referral Review    Patient meets the inclusion criteria for screening colonoscopy standing order.    Ordering/Referring Provider:  Yani Max      BMI: Estimated body mass index is 32.55 kg/m  as calculated from the following:    Height as of 1/10/24: 1.61 m (5' 3.39\").    Weight as of 1/10/24: 84.4 kg (186 lb).     Sedation:  Does patient have any of the following conditions affecting sedation?  No medical conditions affecting sedation.    Previous Scopes:  Any previous recommendations or follow up needs based on previous scope?  na / No recommendations.    Medical Concerns to Postpone Order:  Does patient have any of the following medical concerns that should postpone/delay colonoscopy referral?  No medical conditions affecting colonoscopy referral.    Final Referral Details:  Based on patient's medical history patient is appropriate for referral order with moderate sedation. If patient's BMI > 50 do not schedule in ASC.  "

## 2024-02-21 ENCOUNTER — OFFICE VISIT (OUTPATIENT)
Dept: FAMILY MEDICINE | Facility: CLINIC | Age: 64
End: 2024-02-21
Payer: MEDICARE

## 2024-02-21 VITALS
OXYGEN SATURATION: 98 % | DIASTOLIC BLOOD PRESSURE: 88 MMHG | BODY MASS INDEX: 32.69 KG/M2 | HEIGHT: 63 IN | TEMPERATURE: 97 F | SYSTOLIC BLOOD PRESSURE: 132 MMHG | RESPIRATION RATE: 14 BRPM | HEART RATE: 89 BPM

## 2024-02-21 DIAGNOSIS — R73.03 PREDIABETES: ICD-10-CM

## 2024-02-21 DIAGNOSIS — J45.909 UNCOMPLICATED ASTHMA, UNSPECIFIED ASTHMA SEVERITY, UNSPECIFIED WHETHER PERSISTENT: ICD-10-CM

## 2024-02-21 DIAGNOSIS — L30.9 DERMATITIS: ICD-10-CM

## 2024-02-21 DIAGNOSIS — N18.30 STAGE 3 CHRONIC KIDNEY DISEASE, UNSPECIFIED WHETHER STAGE 3A OR 3B CKD (H): ICD-10-CM

## 2024-02-21 DIAGNOSIS — Z12.4 CERVICAL CANCER SCREENING: ICD-10-CM

## 2024-02-21 LAB
ALBUMIN SERPL BCG-MCNC: 5 G/DL (ref 3.5–5.2)
ALP SERPL-CCNC: 54 U/L (ref 40–150)
ALT SERPL W P-5'-P-CCNC: 38 U/L (ref 0–50)
ANION GAP SERPL CALCULATED.3IONS-SCNC: 14 MMOL/L (ref 7–15)
AST SERPL W P-5'-P-CCNC: 46 U/L (ref 0–45)
BILIRUB SERPL-MCNC: 0.4 MG/DL
BUN SERPL-MCNC: 18 MG/DL (ref 8–23)
CALCIUM SERPL-MCNC: 10.2 MG/DL (ref 8.8–10.2)
CHLORIDE SERPL-SCNC: 102 MMOL/L (ref 98–107)
CHOLEST SERPL-MCNC: 257 MG/DL
CREAT SERPL-MCNC: 1.47 MG/DL (ref 0.51–0.95)
CREAT UR-MCNC: 55.7 MG/DL
DEPRECATED HCO3 PLAS-SCNC: 20 MMOL/L (ref 22–29)
EGFRCR SERPLBLD CKD-EPI 2021: 40 ML/MIN/1.73M2
ERYTHROCYTE [DISTWIDTH] IN BLOOD BY AUTOMATED COUNT: 14.1 % (ref 10–15)
FASTING STATUS PATIENT QL REPORTED: YES
GLUCOSE SERPL-MCNC: 141 MG/DL (ref 70–99)
HBA1C MFR BLD: 6.5 % (ref 0–5.6)
HCT VFR BLD AUTO: 40.3 % (ref 35–47)
HDLC SERPL-MCNC: 6 MG/DL
HGB BLD-MCNC: 12.6 G/DL (ref 11.7–15.7)
LDLC SERPL CALC-MCNC: ABNORMAL MG/DL
LDLC SERPL DIRECT ASSAY-MCNC: 87 MG/DL
MCH RBC QN AUTO: 31.3 PG (ref 26.5–33)
MCHC RBC AUTO-ENTMCNC: 31.3 G/DL (ref 31.5–36.5)
MCV RBC AUTO: 100 FL (ref 78–100)
MICROALBUMIN UR-MCNC: <12 MG/L
MICROALBUMIN/CREAT UR: NORMAL MG/G{CREAT}
NONHDLC SERPL-MCNC: 251 MG/DL
PLATELET # BLD AUTO: 303 10E3/UL (ref 150–450)
POTASSIUM SERPL-SCNC: 4.5 MMOL/L (ref 3.4–5.3)
PROT SERPL-MCNC: 8.3 G/DL (ref 6.4–8.3)
RBC # BLD AUTO: 4.03 10E6/UL (ref 3.8–5.2)
SODIUM SERPL-SCNC: 136 MMOL/L (ref 135–145)
T4 FREE SERPL-MCNC: 0.9 NG/DL (ref 0.9–1.7)
TRIGL SERPL-MCNC: 621 MG/DL
TSH SERPL DL<=0.005 MIU/L-ACNC: 25.9 UIU/ML (ref 0.3–4.2)
WBC # BLD AUTO: 8.4 10E3/UL (ref 4–11)

## 2024-02-21 PROCEDURE — G0124 SCREEN C/V THIN LAYER BY MD: HCPCS | Performed by: PATHOLOGY

## 2024-02-21 PROCEDURE — 85027 COMPLETE CBC AUTOMATED: CPT | Performed by: FAMILY MEDICINE

## 2024-02-21 PROCEDURE — 80053 COMPREHEN METABOLIC PANEL: CPT | Performed by: FAMILY MEDICINE

## 2024-02-21 PROCEDURE — 84443 ASSAY THYROID STIM HORMONE: CPT | Performed by: FAMILY MEDICINE

## 2024-02-21 PROCEDURE — 99214 OFFICE O/P EST MOD 30 MIN: CPT | Mod: 25 | Performed by: FAMILY MEDICINE

## 2024-02-21 PROCEDURE — G0145 SCR C/V CYTO,THINLAYER,RESCR: HCPCS | Performed by: FAMILY MEDICINE

## 2024-02-21 PROCEDURE — G0009 ADMIN PNEUMOCOCCAL VACCINE: HCPCS | Performed by: FAMILY MEDICINE

## 2024-02-21 PROCEDURE — 82043 UR ALBUMIN QUANTITATIVE: CPT | Performed by: FAMILY MEDICINE

## 2024-02-21 PROCEDURE — 82570 ASSAY OF URINE CREATININE: CPT | Performed by: FAMILY MEDICINE

## 2024-02-21 PROCEDURE — 83036 HEMOGLOBIN GLYCOSYLATED A1C: CPT | Performed by: FAMILY MEDICINE

## 2024-02-21 PROCEDURE — 83721 ASSAY OF BLOOD LIPOPROTEIN: CPT | Mod: 59 | Performed by: FAMILY MEDICINE

## 2024-02-21 PROCEDURE — 87624 HPV HI-RISK TYP POOLED RSLT: CPT | Performed by: FAMILY MEDICINE

## 2024-02-21 PROCEDURE — 36415 COLL VENOUS BLD VENIPUNCTURE: CPT | Performed by: FAMILY MEDICINE

## 2024-02-21 PROCEDURE — 84439 ASSAY OF FREE THYROXINE: CPT | Performed by: FAMILY MEDICINE

## 2024-02-21 PROCEDURE — 80061 LIPID PANEL: CPT | Performed by: FAMILY MEDICINE

## 2024-02-21 PROCEDURE — 90677 PCV20 VACCINE IM: CPT | Performed by: FAMILY MEDICINE

## 2024-02-21 RX ORDER — RESPIRATORY SYNCYTIAL VIRUS VACCINE 120MCG/0.5
0.5 KIT INTRAMUSCULAR ONCE
Qty: 1 EACH | Refills: 0 | Status: CANCELLED | OUTPATIENT
Start: 2024-02-21 | End: 2024-02-21

## 2024-02-21 ASSESSMENT — PAIN SCALES - GENERAL: PAINLEVEL: NO PAIN (0)

## 2024-02-21 ASSESSMENT — ASTHMA QUESTIONNAIRES: ACT_TOTALSCORE: 20

## 2024-02-21 NOTE — NURSING NOTE
Prior to immunization administration, verified patients identity using patient s name and date of birth. Please see Immunization Activity for additional information.     Screening Questionnaire for Adult Immunization    Are you sick today?   No   Do you have allergies to medications, food, a vaccine component or latex?   No   Have you ever had a serious reaction after receiving a vaccination?   No   Do you have a long-term health problem with heart, lung, kidney, or metabolic disease (e.g., diabetes), asthma, a blood disorder, no spleen, complement component deficiency, a cochlear implant, or a spinal fluid leak?  Are you on long-term aspirin therapy?   No   Do you have cancer, leukemia, HIV/AIDS, or any other immune system problem?   No   Do you have a parent, brother, or sister with an immune system problem?   No   In the past 3 months, have you taken medications that affect  your immune system, such as prednisone, other steroids, or anticancer drugs; drugs for the treatment of rheumatoid arthritis, Crohn s disease, or psoriasis; or have you had radiation treatments?   No   Have you had a seizure, or a brain or other nervous system problem?   No   During the past year, have you received a transfusion of blood or blood    products, or been given immune (gamma) globulin or antiviral drug?   No   For women: Are you pregnant or is there a chance you could become       pregnant during the next month?   No   Have you received any vaccinations in the past 4 weeks?   No     Immunization questionnaire answers were all negative.      Patient instructed to remain in clinic for 15 minutes afterwards, and to report any adverse reactions.     Screening performed by Suki Moody MA on 2/21/2024 at 10:51 AM.

## 2024-02-21 NOTE — PROGRESS NOTES
"  Assessment & Plan     Stage 3 chronic kidney disease, unspecified whether stage 3a or 3b CKD (H)  - Lipid panel reflex to direct LDL Fasting; Future  - Albumin Random Urine Quantitative with Creat Ratio; Future    Cervical cancer screening  - Pap Screen with HPV - recommended age 30 - 65 years    Prediabetes  - Hemoglobin A1c; Future    Uncomplicated asthma, unspecified asthma severity, unspecified whether persistent  -       1/10/2024    11:12 AM 1/10/2024    11:50 AM 2/21/2024    10:12 AM   ACT Total Scores   ACT TOTAL SCORE (Goal Greater than or Equal to 20) 19 19 20   In the past 12 months, how many times did you visit the emergency room for your asthma without being admitted to the hospital? 0 0 0   In the past 12 months, how many times were you hospitalized overnight because of your asthma? 0 0 0    - continue current regimen     Dermatitis  - improved, advised to continue to moisturize             BMI  Estimated body mass index is 32.69 kg/m  as calculated from the following:    Height as of this encounter: 1.607 m (5' 3.25\").    Weight as of 1/10/24: 84.4 kg (186 lb).             Karen Hoffman is a 63 year old, presenting for the following health issues:  Gyn Exam and Asthma (Insurance doesn't cover inhaler and would like a different one )      2/21/2024     9:56 AM   Additional Questions   Roomed by Christina LAGUERRE     History of Present Illness     Asthma:  She presents for follow up of asthma.  She has no cough, no wheezing, and no shortness of breath.  She is using a relief medication a few times a week. She does not miss any doses of her controller medication throughout the week. Patient is aware of the following triggers: dust mites. The patient has not had a visit to the Emergency Room, Urgent Care or Hospital due to asthma since the last clinic visit.     Reason for visit:  Pap smear and skin folllw up      Asthma - symptoms improved with increase Flovent  Dermatitis - resolved with " "triamcinolone cream                      Objective    /88 (BP Location: Right arm, Patient Position: Sitting, Cuff Size: Adult Large)   Pulse 89   Temp 97  F (36.1  C) (Temporal)   Resp 14   Ht 1.607 m (5' 3.25\")   SpO2 98%   BMI 32.69 kg/m    Body mass index is 32.69 kg/m .  Physical Exam               Signed Electronically by: Yani Max MD    "

## 2024-02-23 PROCEDURE — 82274 ASSAY TEST FOR BLOOD FECAL: CPT | Performed by: FAMILY MEDICINE

## 2024-02-27 LAB
BKR LAB AP GYN ADEQUACY: ABNORMAL
BKR LAB AP GYN INTERPRETATION: ABNORMAL
BKR LAB AP HPV REFLEX: ABNORMAL
BKR LAB AP PREVIOUS ABNORMAL: ABNORMAL
PATH REPORT.COMMENTS IMP SPEC: ABNORMAL
PATH REPORT.COMMENTS IMP SPEC: ABNORMAL
PATH REPORT.RELEVANT HX SPEC: ABNORMAL

## 2024-02-28 ENCOUNTER — TELEPHONE (OUTPATIENT)
Dept: FAMILY MEDICINE | Facility: CLINIC | Age: 64
End: 2024-02-28
Payer: MEDICARE

## 2024-02-28 DIAGNOSIS — J45.909 UNCOMPLICATED ASTHMA, UNSPECIFIED ASTHMA SEVERITY, UNSPECIFIED WHETHER PERSISTENT: Primary | ICD-10-CM

## 2024-02-28 NOTE — TELEPHONE ENCOUNTER
Prior Authorization Retail Medication Request    Medication/Dose: Fluticasone Propionate (Flovent)  HFA 220MCG/ACT Aerosol  Diagnosis and ICD code (if different than what is on RX):  Uncomplicated asthma, unspecified asthma severity, unspecified whether persistent     Insurance   Primary: MEDICARE   Insurance ID:  [id number]     Pharmacy Information (if different than what is on RX)  Name[facility]  Phone:  [phone]   Fax:[phone]

## 2024-02-29 LAB
HEMOCCULT STL QL IA: NEGATIVE
HUMAN PAPILLOMA VIRUS 16 DNA: NEGATIVE
HUMAN PAPILLOMA VIRUS 18 DNA: NEGATIVE
HUMAN PAPILLOMA VIRUS FINAL DIAGNOSIS: NORMAL
HUMAN PAPILLOMA VIRUS OTHER HR: NEGATIVE

## 2024-03-05 ENCOUNTER — PATIENT OUTREACH (OUTPATIENT)
Dept: FAMILY MEDICINE | Facility: CLINIC | Age: 64
End: 2024-03-05
Payer: MEDICARE

## 2024-03-08 DIAGNOSIS — E78.2 MIXED HYPERLIPIDEMIA: Primary | ICD-10-CM

## 2024-03-08 DIAGNOSIS — E03.9 HYPOTHYROIDISM, UNSPECIFIED TYPE: ICD-10-CM

## 2024-03-08 NOTE — TELEPHONE ENCOUNTER
Patient is calling is asking that if we could get the new medication sent to pharmacy Flovent Diskus which is covered by insurance

## 2024-03-11 RX ORDER — LEVOTHYROXINE SODIUM 150 UG/1
150 TABLET ORAL DAILY
Qty: 90 TABLET | Refills: 1 | Status: SHIPPED | OUTPATIENT
Start: 2024-03-11 | End: 2024-07-29

## 2024-03-11 RX ORDER — LEVOTHYROXINE SODIUM 150 UG/1
150 TABLET ORAL DAILY
Qty: 90 TABLET | Refills: 0 | OUTPATIENT
Start: 2024-03-11

## 2024-03-11 NOTE — TELEPHONE ENCOUNTER
RN - can you please reach out to pt regarding labs/refill    2/21/24 labs showed thyroid levels were abnormal. Her levels were previously normal. Has she missed any recent dose? If she hasn't then I would adjust her synthroid dose from synthroid 137 mcg/day to 150 mcg/day.     Kidney functions were slightly reduced. Please continue to stay hydrated and avoid daily NSAIDs.    Cholesterol levels continue to be elevated. Referred to cardiology clinic.     Hgba1c diabetes range. She needs another lab in 1-2 week to recheck levels. If levels are in this range then she has new diabetes and I would recommend seeing a diabetes educator. If levels are lower then prediabetes and recheck levels in three months.     Please schedule a three month clinic follow up to recheck thyroid levels.     DM

## 2024-03-11 NOTE — TELEPHONE ENCOUNTER
Dr. Max-Please advise if you recommend Levothyroxine dose change?    Triage-Patient will still need to be scheduled for A1C recheck and possibly a follow up appt with PCP depending on Levothyroxine dose adjustment.    Writer called patient and reviewed message per Dr. Max.    Patient verbalized understanding and in agreement with plan.  Patient stated:  Did miss doses of Levothyroxine prior to 2/21/24 lab appt  Agreeable to Cardiology referral      Thank you!  ALIN CarreraN, RN-[facility]

## 2024-03-12 NOTE — TELEPHONE ENCOUNTER
Writer called and left message on patient's voicemail to call back and speak with a triage nurse.    ok to leave vml: from Dr. Max:   Increase synthroid dose to 150 mcg/day.  Signed cardiology referral  Schedule appointment for follow-up in 3 months. End of May.    ALIN LynnN RN  [facility]

## 2024-03-22 ENCOUNTER — NURSE TRIAGE (OUTPATIENT)
Dept: FAMILY MEDICINE | Facility: CLINIC | Age: 64
End: 2024-03-22
Payer: MEDICARE

## 2024-03-22 NOTE — TELEPHONE ENCOUNTER
"Pt reporting intermittent chest pain x 24 hours, new onset. Pain lasts about 1 hour, pt reporting relief after drinking water. Pt reporting she feels like she could be dehydrated. Pt denies nausea/vomiting.  Pt instructed to go to ED. Pt verbalizes understanding and agrees to plan of care.     1. LOCATION: \"Where does it hurt?\"        Mid center, dull   2. RADIATION: \"Does the pain go anywhere else?\" (e.g., into neck, jaw, arms, back)      No   3. ONSET: \"When did the chest pain begin?\" (Minutes, hours or days)       Pt reporting last episode two nights ago   4. PATTERN: \"Does the pain come and go, or has it been constant since it started?\"  \"Does it get worse with exertion?\"       Intermittent, relieved when pt drank water.   5. DURATION: \"How long does it last\" (e.g., seconds, minutes, hours)      ~ 1 hour, x 1 day, reporting intermittent chest since last night.   6. SEVERITY: \"How bad is the pain?\"  (e.g., Scale 1-10; mild, moderate, or severe)     - MILD (1-3): doesn't interfere with normal activities      - MODERATE (4-7): interferes with normal activities or awakens from sleep     - SEVERE (8-10): excruciating pain, unable to do any normal activities        7/10  7. CARDIAC RISK FACTORS: \"Do you have any history of heart problems or risk factors for heart disease?\" (e.g., angina, prior heart attack; diabetes, high blood pressure, high cholesterol, smoker, or strong family history of heart disease)      High cholesterol   8. PULMONARY RISK FACTORS: \"Do you have any history of lung disease?\"  (e.g., blood clots in lung, asthma, emphysema, birth control pills)      Asthma   9. CAUSE: \"What do you think is causing the chest pain?\"      Unsure; dehydration? Pt denies nausea vomiting  10. OTHER SYMPTOMS: \"Do you have any other symptoms?\" (e.g., dizziness, nausea, vomiting, sweating, fever, difficulty breathing, cough)        Denies   11. PREGNANCY: \"Is there any chance you are pregnant?\" \"When was your last " "menstrual period?\"        No     Reason for Disposition   Chest pain lasting longer than 5 minutes and occurred in last 3 days (72 hours) (Exception: Feels exactly the same as previously diagnosed heartburn and has accompanying sour taste in mouth.)    Additional Information   Negative: Passed out (i.e., lost consciousness, collapsed and was not responding)   Negative: Difficult to awaken or acting confused (e.g., disoriented, slurred speech)   Negative: Shock suspected (e.g., cold/pale/clammy skin, too weak to stand, low BP, rapid pulse)   Negative: SEVERE difficulty breathing (e.g., struggling for each breath, speaks in single words)   Negative: Chest pain lasting longer than 5 minutes and ANY of the following:         Pain is crushing, pressure-like, or heavy         Over 44 years old          Over 30 years old and one cardiac risk factor (e.g diabetes, high blood pressure, high cholesterol, smoker, or family history of heart disease)         History of heart disease (e.g. angina, heart attack, heart failure, bypass surgery, takes nitroglycerin)   Negative: Heart beating < 50 beats per minute OR > 140 beats per minute   Negative: Visible sweat on face or sweat dripping down face   Negative: Sounds like a life-threatening emergency to the triager   Negative: Followed an injury to chest   Negative: SEVERE chest pain   Negative: Pain also in shoulder(s) or arm(s) or jaw   Negative: Difficulty breathing   Negative: Cocaine use within last 3 days   Negative: Major surgery in the past month   Negative: Hip or leg fracture (broken bone) in past month (or had cast on leg or ankle in past month)   Negative: Illness requiring prolonged bedrest in past month (e.g., immobilization, long hospital stay)   Negative: Long-distance travel in past month (e.g., car, bus, train, plane; with trip lasting 6 or more hours)   Negative: History of prior 'blood clot' in leg or lungs (i.e., deep vein thrombosis, pulmonary embolism)   " Negative: History of inherited increased risk of blood clots (e.g., Factor 5 Leiden, Anti-thrombin 3, Protein C or Protein S deficiency, Prothrombin mutation)   Negative: Cancer treatment in the past two months (or has cancer now)   Negative: Heart beating irregularly or very rapidly    Protocols used: Chest Pain-A-[place]

## 2024-03-23 ENCOUNTER — APPOINTMENT (OUTPATIENT)
Dept: ULTRASOUND IMAGING | Facility: CLINIC | Age: 64
End: 2024-03-23
Attending: STUDENT IN AN ORGANIZED HEALTH CARE EDUCATION/TRAINING PROGRAM
Payer: MEDICARE

## 2024-03-23 ENCOUNTER — HOSPITAL ENCOUNTER (EMERGENCY)
Facility: CLINIC | Age: 64
Discharge: HOME | End: 2024-03-23
Attending: STUDENT IN AN ORGANIZED HEALTH CARE EDUCATION/TRAINING PROGRAM | Admitting: STUDENT IN AN ORGANIZED HEALTH CARE EDUCATION/TRAINING PROGRAM
Payer: MEDICARE

## 2024-03-23 ENCOUNTER — APPOINTMENT (OUTPATIENT)
Dept: GENERAL RADIOLOGY | Facility: CLINIC | Age: 64
End: 2024-03-23
Attending: STUDENT IN AN ORGANIZED HEALTH CARE EDUCATION/TRAINING PROGRAM
Payer: MEDICARE

## 2024-03-23 VITALS
SYSTOLIC BLOOD PRESSURE: 144 MMHG | HEART RATE: 90 BPM | DIASTOLIC BLOOD PRESSURE: 78 MMHG | TEMPERATURE: 97.4 F | RESPIRATION RATE: 18 BRPM | OXYGEN SATURATION: 99 %

## 2024-03-23 DIAGNOSIS — R10.13 ABDOMINAL PAIN, EPIGASTRIC: ICD-10-CM

## 2024-03-23 LAB
ALBUMIN SERPL BCG-MCNC: 4.9 G/DL (ref 3.5–5.2)
ALP SERPL-CCNC: 57 U/L (ref 40–150)
ALT SERPL W P-5'-P-CCNC: 38 U/L (ref 0–50)
ANION GAP SERPL CALCULATED.3IONS-SCNC: 13 MMOL/L (ref 7–15)
AST SERPL W P-5'-P-CCNC: 51 U/L (ref 0–45)
BASOPHILS # BLD AUTO: 0.1 10E3/UL (ref 0–0.2)
BASOPHILS NFR BLD AUTO: 1 %
BILIRUB SERPL-MCNC: 0.3 MG/DL
BUN SERPL-MCNC: 25 MG/DL (ref 8–23)
CALCIUM SERPL-MCNC: 10.5 MG/DL (ref 8.8–10.2)
CHLORIDE SERPL-SCNC: 102 MMOL/L (ref 98–107)
CREAT SERPL-MCNC: 1.5 MG/DL (ref 0.51–0.95)
DEPRECATED HCO3 PLAS-SCNC: 22 MMOL/L (ref 22–29)
EGFRCR SERPLBLD CKD-EPI 2021: 39 ML/MIN/1.73M2
EOSINOPHIL # BLD AUTO: 0.2 10E3/UL (ref 0–0.7)
EOSINOPHIL NFR BLD AUTO: 2 %
ERYTHROCYTE [DISTWIDTH] IN BLOOD BY AUTOMATED COUNT: 14.8 % (ref 10–15)
GLUCOSE SERPL-MCNC: 123 MG/DL (ref 70–99)
HCT VFR BLD AUTO: 37.2 % (ref 35–47)
HGB BLD-MCNC: 12.2 G/DL (ref 11.7–15.7)
IMM GRANULOCYTES # BLD: 0 10E3/UL
IMM GRANULOCYTES NFR BLD: 0 %
LIPASE SERPL-CCNC: 46 U/L (ref 13–60)
LYMPHOCYTES # BLD AUTO: 4 10E3/UL (ref 0.8–5.3)
LYMPHOCYTES NFR BLD AUTO: 45 %
MCH RBC QN AUTO: 32.4 PG (ref 26.5–33)
MCHC RBC AUTO-ENTMCNC: 32.8 G/DL (ref 31.5–36.5)
MCV RBC AUTO: 99 FL (ref 78–100)
MONOCYTES # BLD AUTO: 0.5 10E3/UL (ref 0–1.3)
MONOCYTES NFR BLD AUTO: 6 %
NEUTROPHILS # BLD AUTO: 4 10E3/UL (ref 1.6–8.3)
NEUTROPHILS NFR BLD AUTO: 46 %
NRBC # BLD AUTO: 0 10E3/UL
NRBC BLD AUTO-RTO: 0 /100
PLATELET # BLD AUTO: 284 10E3/UL (ref 150–450)
POTASSIUM SERPL-SCNC: 4.8 MMOL/L (ref 3.4–5.3)
PROT SERPL-MCNC: 7.9 G/DL (ref 6.4–8.3)
RBC # BLD AUTO: 3.76 10E6/UL (ref 3.8–5.2)
SODIUM SERPL-SCNC: 137 MMOL/L (ref 135–145)
TROPONIN T SERPL HS-MCNC: 8 NG/L
WBC # BLD AUTO: 8.7 10E3/UL (ref 4–11)

## 2024-03-23 PROCEDURE — 83690 ASSAY OF LIPASE: CPT | Performed by: STUDENT IN AN ORGANIZED HEALTH CARE EDUCATION/TRAINING PROGRAM

## 2024-03-23 PROCEDURE — 71046 X-RAY EXAM CHEST 2 VIEWS: CPT | Mod: 26 | Performed by: RADIOLOGY

## 2024-03-23 PROCEDURE — 80053 COMPREHEN METABOLIC PANEL: CPT | Performed by: STUDENT IN AN ORGANIZED HEALTH CARE EDUCATION/TRAINING PROGRAM

## 2024-03-23 PROCEDURE — 93005 ELECTROCARDIOGRAM TRACING: CPT | Performed by: STUDENT IN AN ORGANIZED HEALTH CARE EDUCATION/TRAINING PROGRAM

## 2024-03-23 PROCEDURE — 71046 X-RAY EXAM CHEST 2 VIEWS: CPT

## 2024-03-23 PROCEDURE — 99284 EMERGENCY DEPT VISIT MOD MDM: CPT | Mod: 25 | Performed by: STUDENT IN AN ORGANIZED HEALTH CARE EDUCATION/TRAINING PROGRAM

## 2024-03-23 PROCEDURE — 85025 COMPLETE CBC W/AUTO DIFF WBC: CPT | Performed by: STUDENT IN AN ORGANIZED HEALTH CARE EDUCATION/TRAINING PROGRAM

## 2024-03-23 PROCEDURE — 76705 ECHO EXAM OF ABDOMEN: CPT | Mod: 26 | Performed by: RADIOLOGY

## 2024-03-23 PROCEDURE — 84484 ASSAY OF TROPONIN QUANT: CPT | Performed by: STUDENT IN AN ORGANIZED HEALTH CARE EDUCATION/TRAINING PROGRAM

## 2024-03-23 PROCEDURE — 76705 ECHO EXAM OF ABDOMEN: CPT

## 2024-03-23 PROCEDURE — 99285 EMERGENCY DEPT VISIT HI MDM: CPT | Mod: 25 | Performed by: STUDENT IN AN ORGANIZED HEALTH CARE EDUCATION/TRAINING PROGRAM

## 2024-03-23 PROCEDURE — 93010 ELECTROCARDIOGRAM REPORT: CPT | Performed by: STUDENT IN AN ORGANIZED HEALTH CARE EDUCATION/TRAINING PROGRAM

## 2024-03-23 PROCEDURE — 36415 COLL VENOUS BLD VENIPUNCTURE: CPT | Performed by: STUDENT IN AN ORGANIZED HEALTH CARE EDUCATION/TRAINING PROGRAM

## 2024-03-23 RX ORDER — OMEPRAZOLE 40 MG/1
40 CAPSULE, DELAYED RELEASE ORAL DAILY
Qty: 30 CAPSULE | Refills: 0 | Status: SHIPPED | OUTPATIENT
Start: 2024-03-23 | End: 2024-04-22

## 2024-03-23 ASSESSMENT — COLUMBIA-SUICIDE SEVERITY RATING SCALE - C-SSRS
1. IN THE PAST MONTH, HAVE YOU WISHED YOU WERE DEAD OR WISHED YOU COULD GO TO SLEEP AND NOT WAKE UP?: NO
2. HAVE YOU ACTUALLY HAD ANY THOUGHTS OF KILLING YOURSELF IN THE PAST MONTH?: NO

## 2024-03-23 ASSESSMENT — ACTIVITIES OF DAILY LIVING (ADL)
ADLS_ACUITY_SCORE: 35

## 2024-03-23 NOTE — ED PROVIDER NOTES
ED Provider Note  [facility]      History     Chief Complaint   Patient presents with    Chest Pain     HPI  Dalton Kumar is a 63 year old female with a history of CKD 3, HTN, hypertriglyceridemia, and hypothyroidism presents to the emergency department for chest pain.    Patient is that she has had 2 episodes in the last 2 days with this chest pain and when she called her nursing line today they told her to come in to get evaluated.  The first episode happened while she was laying down 2 nights ago, lasted for about 15 minutes, and was relieved after she took some Tums.  She does note that she ate maybe an hour prior to this which was like a roast beef sandwich.  Yesterday, she had another episode that was very brief, and resolved on its own after laying down during a nap.    No fevers or chills.  Denies any current chest pain or shortness of breath.  When she shows me where her pain was primarily she gestures to her mid chest, epigastrium and or across her right upper quadrant    Past Medical History  Past Medical History:   Diagnosis Date    Bipolar 1 disorder (H)     Hyperlipidemia     Hypertension     Hypothyroidism (acquired)     Lithium toxicity     Retinitis pigmentosa     Subdural hematoma (H)      No past surgical history on file.  omeprazole (PRILOSEC) 40 MG DR capsule  acetaminophen (TYLENOL) 500 MG tablet  albuterol (PROAIR HFA/PROVENTIL HFA/VENTOLIN HFA) 108 (90 Base) MCG/ACT inhaler  aspirin 81 MG tablet  atorvastatin (LIPITOR) 80 MG tablet  buPROPion (WELLBUTRIN XL) 150 MG 24 hr tablet  desonide (DESOWEN) 0.05 % cream  diphenhydrAMINE (BENADRYL) 25 MG capsule  DiphenhydrAMINE HCl (BENADRYL PO)  divalproex sodium delayed-release (DEPAKOTE) 500 MG DR tablet  fenofibrate (TRICOR) 48 MG tablet  fluticasone (FLOVENT DISKUS) 250 MCG/ACT inhaler  fluticasone (FLOVENT HFA) 110 MCG/ACT inhaler  fluticasone (FLOVENT HFA) 220 MCG/ACT inhaler  levothyroxine (SYNTHROID/LEVOTHROID)  137 MCG tablet  levothyroxine (SYNTHROID/LEVOTHROID) 150 MCG tablet  lisinopril (ZESTRIL) 2.5 MG tablet  paliperidone ER (INVEGA) 6 MG 24 hr tablet  pimecrolimus (ELIDEL) 1 % cream  Skin Protectants, Misc. (EUCERIN) cream  topiramate (TOPAMAX) 100 MG tablet  vitamin D2 (ERGOCALCIFEROL) 45796 units (1250 mcg) capsule      Allergies   Allergen Reactions    Fumaric Acid Itching    Haloperidol Swelling     dystonia possibly    Hydromorphone Unknown    Morphine Unknown    Morphine Sulfate (Concentrate) Itching    No Clinical Screening - See Comments Itching    Oxycodone-Acetaminophen Unknown    Penicillins Other (See Comments) and Unknown    Strawberry Extract Hives and Unknown     Family History  Family History   Problem Relation Age of Onset    Coronary Artery Disease Early Onset Father     Emphysema Paternal Aunt     Diabetes Mother     Glaucoma No family hx of     Macular Degeneration No family hx of      Social History   Social History     Tobacco Use    Smoking status: Former     Packs/day: 0.50     Years: 7.00     Additional pack years: 0.00     Total pack years: 3.50     Types: Cigarettes     Quit date: 2001     Years since quittin.3    Smokeless tobacco: Never   Vaping Use    Vaping Use: Never used   Substance Use Topics    Alcohol use: Yes     Comment: 1 beer    Drug use: Never         A medically appropriate review of systems was performed with pertinent positives and negatives noted in the HPI, and all other systems negative.    Physical Exam   BP: (!) 154/92  Pulse: 91  Temp: 97.3  F (36.3  C)  Resp: 18  SpO2: 95 %  Physical Exam  GEN: Well appearing, non toxic, cooperative  HEENT: normocephalic and atraumatic, PERRLA, EOMI  CV: well-perfused, normal skin color for ethnicity, regular rate and rhythm, no tenderness to palpation  PULM: breathing comfortably, in no respiratory distress, clear to auscultation upper and lower lung fields  ABD: nondistended, soft, nontender, negative Echeverria, negative  [place] point tenderness  Back no tenderness CVA:   EXT: Full range of motion.  No edema.  NEURO: awake, conversant, grossly normal bilateral upper and lower extremity strength & ROM   SKIN: No rashes, ecchymosis, or lacerations  PSYCH: Calm and cooperative, interactive      ED Course, Procedures, & Data      Procedures            EKG Interpretation:      Interpreted by Brigette Olivo MD  Time reviewed: 1640  Symptoms at time of EKG: none   Rhythm: normal sinus   Rate: normal  Axis: normal  Ectopy: none  Conduction: normal  ST Segments/ T Waves: Left anterior fascicular block, baseline  Q Waves: none  Comparison to prior: Unchanged    Clinical Impression: Baseline EKG for patient      Results for orders placed or performed during the hospital encounter of 03/23/24   XR Chest 2 Views     Status: None    Narrative    EXAM: XR CHEST 2 VIEWS  3/23/2024 4:54 PM     HISTORY:  chest pain       COMPARISON:  Chest radiograph 12/4/2017    FINDINGS:   Trachea is mildly deviated to the right. Cardiac silhouette is within  normal limits. Pulmonary vasculature is distinct. No focal airspace  opacity, pleural effusion, pneumothorax.    No acute osseous abnormality. Visualized soft tissues and upper  abdomen are unremarkable.      Impression    IMPRESSION:   No acute focal airspace disease.    I have personally reviewed the examination and initial interpretation  and I agree with the findings.    TOIRN ANGLIN MD         SYSTEM ID:  [id number]   US Abdomen Limited (RUQ)     Status: None (Preliminary result)    Impression    RESIDENT PRELIMINARY INTERPRETATION  IMPRESSION:   1.  Hepatomegaly and increased hepatic echogenicity likely  representing hepatic steatosis.  2.  No evidence of cholelithiasis or cholecystitis.   Comprehensive metabolic panel     Status: Abnormal   Result Value Ref Range    Sodium 137 135 - 145 mmol/L    Potassium 4.8 3.4 - 5.3 mmol/L    Carbon Dioxide (CO2) 22 22 - 29 mmol/L    Anion Gap 13 7 - 15 mmol/L     Urea Nitrogen 25.0 (H) 8.0 - 23.0 mg/dL    Creatinine 1.50 (H) 0.51 - 0.95 mg/dL    GFR Estimate 39 (L) >60 mL/min/1.73m2    Calcium 10.5 (H) 8.8 - 10.2 mg/dL    Chloride 102 98 - 107 mmol/L    Glucose 123 (H) 70 - 99 mg/dL    Alkaline Phosphatase 57 40 - 150 U/L    AST 51 (H) 0 - 45 U/L    ALT 38 0 - 50 U/L    Protein Total 7.9 6.4 - 8.3 g/dL    Albumin 4.9 3.5 - 5.2 g/dL    Bilirubin Total 0.3 <=1.2 mg/dL   Lipase     Status: Normal   Result Value Ref Range    Lipase 46 13 - 60 U/L   Troponin T, High Sensitivity     Status: Normal   Result Value Ref Range    Troponin T, High Sensitivity 8 <=14 ng/L   CBC with platelets and differential     Status: Abnormal   Result Value Ref Range    WBC Count 8.7 4.0 - 11.0 10e3/uL    RBC Count 3.76 (L) 3.80 - 5.20 10e6/uL    Hemoglobin 12.2 11.7 - 15.7 g/dL    Hematocrit 37.2 35.0 - 47.0 %    MCV 99 78 - 100 fL    MCH 32.4 26.5 - 33.0 pg    MCHC 32.8 31.5 - 36.5 g/dL    RDW 14.8 10.0 - 15.0 %    Platelet Count 284 150 - 450 10e3/uL    % Neutrophils 46 %    % Lymphocytes 45 %    % Monocytes 6 %    % Eosinophils 2 %    % Basophils 1 %    % Immature Granulocytes 0 %    NRBCs per 100 WBC 0 <1 /100    Absolute Neutrophils 4.0 1.6 - 8.3 10e3/uL    Absolute Lymphocytes 4.0 0.8 - 5.3 10e3/uL    Absolute Monocytes 0.5 0.0 - 1.3 10e3/uL    Absolute Eosinophils 0.2 0.0 - 0.7 10e3/uL    Absolute Basophils 0.1 0.0 - 0.2 10e3/uL    Absolute Immature Granulocytes 0.0 <=0.4 10e3/uL    Absolute NRBCs 0.0 10e3/uL   EKG 12-lead, tracing only     Status: None (Preliminary result)   Result Value Ref Range    Systolic Blood Pressure  mmHg    Diastolic Blood Pressure  mmHg    Ventricular Rate 85 BPM    Atrial Rate 85 BPM    CA Interval 160 ms    QRS Duration 100 ms     ms    QTc 456 ms    P Axis 33 degrees    R AXIS -56 degrees    T Axis 82 degrees    Interpretation ECG       Sinus rhythm  Left anterior fascicular block  Minimal voltage criteria for LVH, may be normal variant  Possible Anterior  infarct , age undetermined  Abnormal ECG     CBC with platelets differential     Status: Abnormal    Narrative    The following orders were created for panel order CBC with platelets differential.  Procedure                               Abnormality         Status                     ---------                               -----------         ------                     CBC with platelets and d...[[id number]]  Abnormal            Final result                 Please view results for these tests on the individual orders.     Medications - No data to display  Labs Ordered and Resulted from Time of ED Arrival to Time of ED Departure   COMPREHENSIVE METABOLIC PANEL - Abnormal       Result Value    Sodium 137      Potassium 4.8      Carbon Dioxide (CO2) 22      Anion Gap 13      Urea Nitrogen 25.0 (*)     Creatinine 1.50 (*)     GFR Estimate 39 (*)     Calcium 10.5 (*)     Chloride 102      Glucose 123 (*)     Alkaline Phosphatase 57      AST 51 (*)     ALT 38      Protein Total 7.9      Albumin 4.9      Bilirubin Total 0.3     CBC WITH PLATELETS AND DIFFERENTIAL - Abnormal    WBC Count 8.7      RBC Count 3.76 (*)     Hemoglobin 12.2      Hematocrit 37.2      MCV 99      MCH 32.4      MCHC 32.8      RDW 14.8      Platelet Count 284      % Neutrophils 46      % Lymphocytes 45      % Monocytes 6      % Eosinophils 2      % Basophils 1      % Immature Granulocytes 0      NRBCs per 100 WBC 0      Absolute Neutrophils 4.0      Absolute Lymphocytes 4.0      Absolute Monocytes 0.5      Absolute Eosinophils 0.2      Absolute Basophils 0.1      Absolute Immature Granulocytes 0.0      Absolute NRBCs 0.0     LIPASE - Normal    Lipase 46     TROPONIN T, HIGH SENSITIVITY - Normal    Troponin T, High Sensitivity 8       US Abdomen Limited (RUQ)   Preliminary Result   RESIDENT PRELIMINARY INTERPRETATION   IMPRESSION:    1.  Hepatomegaly and increased hepatic echogenicity likely   representing hepatic steatosis.   2.  No evidence of  cholelithiasis or cholecystitis.      XR Chest 2 Views   Final Result   IMPRESSION:    No acute focal airspace disease.      I have personally reviewed the examination and initial interpretation   and I agree with the findings.      TORIN ANGLIN MD            SYSTEM ID:  [id number]             Critical care was not performed.     Medical Decision Making  The patient's presentation was of high complexity (an acute health issue posing potential threat to life or bodily function).    The patient's evaluation involved:  an assessment requiring an independent historian (see separate area of note for details)  review of external note(s) from 3+ sources (see separate area of note for details)  review of 3+ test result(s) ordered prior to this encounter (see separate area of note for details)  ordering and/or review of 3+ test(s) in this encounter (see separate area of note for details)    The patient's management necessitated moderate risk (prescription drug management including medications given in the ED).    Assessment & Plan    63-year-old female with a history of hypertension, hyperlipidemia, hypothyroidism, CKD presenting to the emergency department due to 2 episodes of chest pain both occurring while laying down and short lasting improving with Tums that occurred over the last couple of days.  Gesturing to have epigastric and right upper quadrant abdominal pain which she describes as her chest pain.  She denies any shortness of breath, no chest pain at this point in time.  No significant concerning findings on her physical exam.    Differential for her is broad and includes ACS, gastritis, GERD, cholelithiasis, nephrolithiasis, pyelonephritis    Will plan for EKG and troponin.  X-ray of the chest.  Will plan for right upper quadrant ultrasound although low suspicion of cholangitis, cholecystitis, no significant pain at this time.  Would consider giving her some Maalox, however with no pain at this point in time  we will hold.  Low suspicion of ACS and will therefore hold on aspirin.  Also relatively low suspicion of pyelonephritis or nephrolithiasis without any CVA tenderness    7:00 PM lab work reassuring with no elevated troponin, EKG reassuring, and chest x-ray as well as ultrasound normal.No ongoing chest pain while she is been here.  No evidence at this point in time of any cholelithiasis or cholecystitis.  Symptoms are most consistent with GERD.  Will plan to start her on omeprazole and the plan for discharge    I have reviewed the nursing notes. I have reviewed the findings, diagnosis, plan and need for follow up with the patient.    New Prescriptions    OMEPRAZOLE (PRILOSEC) 40 MG DR CAPSULE    Take 1 capsule (40 mg) by mouth daily for 30 days       Final diagnoses:   Abdominal pain, epigastric       Brigette Olivo MD   [facility] EMERGENCY DEPARTMENT  3/23/2024     Brigette Olivo MD  03/23/24 0162

## 2024-03-23 NOTE — ED TRIAGE NOTES
Pt ambulatory to triage with c/o chest pain. Pt states that she has been having intermittent chest pains since yesterday eveing. Pt denies any pain currently.      Triage Assessment (Adult)       Row Name 03/23/24 4511          Triage Assessment    Airway WDL WDL        Respiratory WDL    Respiratory WDL WDL        Skin Circulation/Temperature WDL    Skin Circulation/Temperature WDL WDL        Cardiac WDL    Cardiac WDL WDL        Peripheral/Neurovascular WDL    Peripheral Neurovascular WDL WDL        Cognitive/Neuro/Behavioral WDL    Cognitive/Neuro/Behavioral WDL WDL

## 2024-03-24 NOTE — DISCHARGE INSTRUCTIONS
You were seen in the emergency department due to abdominal and chest pain.  You had labs here, EKGs, chest x-ray all of which show no cause of your symptoms.  This is most consistent with you having acid reflux.  We would recommend that you get started on omeprazole which is a medication to control acid reflux.    If you have worsening severe chest pain, difficulty breathing, or feel like you are going to pass out, we would recommend getting reevaluated.  Otherwise, please take this medication daily, and follow-up with your primary care doctor going forward

## 2024-03-25 LAB
ATRIAL RATE - MUSE: 85 BPM
DIASTOLIC BLOOD PRESSURE - MUSE: NORMAL MMHG
INTERPRETATION ECG - MUSE: NORMAL
P AXIS - MUSE: 33 DEGREES
PR INTERVAL - MUSE: 160 MS
QRS DURATION - MUSE: 100 MS
QT - MUSE: 384 MS
QTC - MUSE: 456 MS
R AXIS - MUSE: -56 DEGREES
SYSTOLIC BLOOD PRESSURE - MUSE: NORMAL MMHG
T AXIS - MUSE: 82 DEGREES
VENTRICULAR RATE- MUSE: 85 BPM

## 2024-04-17 ENCOUNTER — TELEPHONE (OUTPATIENT)
Dept: CARDIOLOGY | Facility: CLINIC | Age: 64
End: 2024-04-17
Payer: MEDICARE

## 2024-04-17 NOTE — TELEPHONE ENCOUNTER
4/17 Left Voicemail (1st Attempt) and MyChart (1st Attempt) for the patient to call back and schedule the following:    Appointment type: New Lipid Management   Provider: Nicanor  Return date: next available   Specialty phone number: [phone] opt 1  Additional appointment(s) needed: N/A  Additonal Notes: N/A

## 2024-04-17 NOTE — TELEPHONE ENCOUNTER
4/17 Patient confirmed scheduled appointment:  Date: 7/9/24  Time: 10:30 am  Visit type: New Lipid Management  Provider: Nicanor  Location: [facility] [place]  Testing/imaging: n/a  Additional notes: n/a

## 2024-07-09 ENCOUNTER — OFFICE VISIT (OUTPATIENT)
Dept: CARDIOLOGY | Facility: CLINIC | Age: 64
End: 2024-07-09
Attending: FAMILY MEDICINE
Payer: MEDICARE

## 2024-07-09 VITALS
BODY MASS INDEX: 32.16 KG/M2 | SYSTOLIC BLOOD PRESSURE: 130 MMHG | WEIGHT: 183 LBS | DIASTOLIC BLOOD PRESSURE: 87 MMHG | OXYGEN SATURATION: 94 % | HEART RATE: 92 BPM

## 2024-07-09 DIAGNOSIS — E78.5 HYPERLIPIDEMIA WITH TARGET LDL LESS THAN 100: ICD-10-CM

## 2024-07-09 DIAGNOSIS — N18.31 STAGE 3A CHRONIC KIDNEY DISEASE (H): ICD-10-CM

## 2024-07-09 DIAGNOSIS — E78.1 HYPERTRIGLYCERIDEMIA: Primary | ICD-10-CM

## 2024-07-09 DIAGNOSIS — E11.9 TYPE 2 DIABETES MELLITUS WITHOUT COMPLICATION, WITHOUT LONG-TERM CURRENT USE OF INSULIN (H): ICD-10-CM

## 2024-07-09 DIAGNOSIS — J45.909 UNCOMPLICATED ASTHMA, UNSPECIFIED ASTHMA SEVERITY, UNSPECIFIED WHETHER PERSISTENT: ICD-10-CM

## 2024-07-09 DIAGNOSIS — H35.53 STARGARDT'S DISEASE: ICD-10-CM

## 2024-07-09 DIAGNOSIS — E78.1 HYPERTRIGLYCERIDEMIA: ICD-10-CM

## 2024-07-09 DIAGNOSIS — I10 HYPERTENSION, ESSENTIAL: ICD-10-CM

## 2024-07-09 DIAGNOSIS — E78.2 MIXED HYPERLIPIDEMIA: ICD-10-CM

## 2024-07-09 PROCEDURE — 99204 OFFICE O/P NEW MOD 45 MIN: CPT | Performed by: INTERNAL MEDICINE

## 2024-07-09 PROCEDURE — G0463 HOSPITAL OUTPT CLINIC VISIT: HCPCS | Performed by: INTERNAL MEDICINE

## 2024-07-09 RX ORDER — OMEGA-3-ACID ETHYL ESTERS 1 G/1
2 CAPSULE, LIQUID FILLED ORAL 2 TIMES DAILY
Qty: 30 CAPSULE | Refills: 3 | Status: SHIPPED | OUTPATIENT
Start: 2024-07-09

## 2024-07-09 RX ORDER — FENOFIBRATE 145 MG/1
145 TABLET, COATED ORAL DAILY
Qty: 30 TABLET | Refills: 3 | Status: SHIPPED | OUTPATIENT
Start: 2024-07-09

## 2024-07-09 ASSESSMENT — PAIN SCALES - GENERAL: PAINLEVEL: NO PAIN (0)

## 2024-07-09 NOTE — LETTER
7/9/2024      RE: Dalton ARANGO José  [address]  [place] [address]       Dear Colleague,    Thank you for the opportunity to participate in the care of your patient, Dalton Kumar, at the [facility] [place] [facility]. Please see a copy of my visit note below.    I am delighted to see Dalton Kumar in consultation.The primary encounter diagnosis was Hypertriglyceridemia. Diagnoses of Mixed hyperlipidemia, Type 2 diabetes mellitus without complication, without long-term current use of insulin (H), Hypertension, essential, Hyperlipidemia with target LDL less than 100, Stargardt's disease, and Stage 3a chronic kidney disease (H) were also pertinent to this visit.   As you know, the patient is a 63 year old  female. She   has a past medical history of Bipolar 1 disorder (H), Chronic kidney disease, Diabetes mellitus, type 2 (H) (07/09/2024), Hyperlipidemia, Hypertension, Hypothyroidism (acquired), Lithium toxicity, Retinitis pigmentosa, and Subdural hematoma (H)..    On this visit, the patient states that she has high cholesterol and triglycerides. The patient denies chest pressure/discomfort, dyspnea, palpitations, near-syncope, syncope, orthopnea, paroxysmal nocturnal dyspnea, and lower extermity edema.    The patient's cardiovascular risk factors include hypertension, high cholesterol, and diabetes mellitus.    The following portions of the patient's history were reviewed and updated as appropriate: allergies, current medications, past family history, past medical history, past social history, past surgical history, and the problem list.    PMH: The patient's past medical history includes:    Past Medical History:   Diagnosis Date     Bipolar 1 disorder (H)      Chronic kidney disease      Diabetes mellitus, type 2 (H) 07/09/2024     Hyperlipidemia      Hypertension      Hypothyroidism (acquired)      Lithium toxicity       Retinitis pigmentosa      Subdural hematoma (H)       History reviewed. No pertinent surgical history.    The patient's medications as of the current encounter are:     Current Outpatient Medications   Medication Sig Dispense Refill     acetaminophen (TYLENOL) 500 MG tablet Take 1,000 mg by mouth       albuterol (PROAIR HFA/PROVENTIL HFA/VENTOLIN HFA) 108 (90 Base) MCG/ACT inhaler INHALE 2 PUFFS BY MOUTH EVERY 6 HOURS AS NEEDED FOR SHORTNESS OF BREATH OR WHEEZING 54 g 11     atorvastatin (LIPITOR) 80 MG tablet TAKE 1 TABLET(80 MG) BY MOUTH DAILY 90 tablet 3     buPROPion (WELLBUTRIN XL) 150 MG 24 hr tablet Take 150 mg by mouth       desonide (DESOWEN) 0.05 % cream Apply topically 2 times daily       DiphenhydrAMINE HCl (BENADRYL PO) Take 25 mg by mouth daily as needed       divalproex sodium delayed-release (DEPAKOTE) 500 MG DR tablet Take 500 mg by mouth 2 times daily       fenofibrate (TRICOR) 145 MG tablet Take 1 tablet (145 mg) by mouth daily 30 tablet 3     fenofibrate (TRICOR) 48 MG tablet TAKE 1 TABLET(48 MG) BY MOUTH DAILY 90 tablet 3     fluticasone (FLOVENT DISKUS) 250 MCG/ACT inhaler Inhale 1 puff into the lungs every 12 hours 60 each 4     fluticasone (FLOVENT HFA) 110 MCG/ACT inhaler Inhale 1 puff into the lungs 2 times daily 12 g 4     fluticasone (FLOVENT HFA) 220 MCG/ACT inhaler Inhale 1 puff into the lungs 2 times daily 12 g 11     levothyroxine (SYNTHROID/LEVOTHROID) 137 MCG tablet Take 1 tablet (137 mcg) by mouth daily 90 tablet 3     levothyroxine (SYNTHROID/LEVOTHROID) 150 MCG tablet Take 1 tablet (150 mcg) by mouth daily 90 tablet 1     lisinopril (ZESTRIL) 2.5 MG tablet Take 1 tablet (2.5 mg) by mouth daily 90 tablet 3     omega-3 acid ethyl esters (LOVAZA) 1 g capsule Take 2 capsules (2 g) by mouth 2 times daily 30 capsule 3     paliperidone ER (INVEGA) 6 MG 24 hr tablet Take 6 mg by mouth       pimecrolimus (ELIDEL) 1 % cream        Skin Protectants, Misc. (EUCERIN) cream         topiramate (TOPAMAX) 100 MG tablet Take 100 mg by mouth       vitamin D2 (ERGOCALCIFEROL) 10498 units (1250 mcg) capsule TAKE 1 CAPSULE BY MOUTH 1 TIME A WEEK 4 capsule 0       Labs:     Admission on 03/23/2024, Discharged on 03/23/2024   Component Date Value Ref Range Status     Sodium 03/23/2024 137  135 - 145 mmol/L Final    Reference intervals for this test were updated on 09/26/2023 to more accurately reflect our healthy population. There may be differences in the flagging of prior results with similar values performed with this method. Interpretation of those prior results can be made in the context of the updated reference intervals.      Potassium 03/23/2024 4.8  3.4 - 5.3 mmol/L Final     Carbon Dioxide (CO2) 03/23/2024 22  22 - 29 mmol/L Final     Anion Gap 03/23/2024 13  7 - 15 mmol/L Final     Urea Nitrogen 03/23/2024 25.0 (H)  8.0 - 23.0 mg/dL Final     Creatinine 03/23/2024 1.50 (H)  0.51 - 0.95 mg/dL Final     GFR Estimate 03/23/2024 39 (L)  >60 mL/min/1.73m2 Final     Calcium 03/23/2024 10.5 (H)  8.8 - 10.2 mg/dL Final     Chloride 03/23/2024 102  98 - 107 mmol/L Final     Glucose 03/23/2024 123 (H)  70 - 99 mg/dL Final     Alkaline Phosphatase 03/23/2024 57  40 - 150 U/L Final    Reference intervals for this test were updated on 11/14/2023 to more accurately reflect our healthy population. There may be differences in the flagging of prior results with similar values performed with this method. Interpretation of those prior results can be made in the context of the updated reference intervals.     AST 03/23/2024 51 (H)  0 - 45 U/L Final    Reference intervals for this test were updated on 6/12/2023 to more accurately reflect our healthy population. There may be differences in the flagging of prior results with similar values performed with this method. Interpretation of those prior results can be made in the context of the updated reference intervals.     ALT 03/23/2024 38  0 - 50 U/L Final     Reference intervals for this test were updated on 6/12/2023 to more accurately reflect our healthy population. There may be differences in the flagging of prior results with similar values performed with this method. Interpretation of those prior results can be made in the context of the updated reference intervals.       Protein Total 03/23/2024 7.9  6.4 - 8.3 g/dL Final     Albumin 03/23/2024 4.9  3.5 - 5.2 g/dL Final     Bilirubin Total 03/23/2024 0.3  <=1.2 mg/dL Final     Lipase 03/23/2024 46  13 - 60 U/L Final     Troponin T, High Sensitivity 03/23/2024 8  <=14 ng/L Final    Either a High Sensitivity Troponin T baseline (0 hours) value = 100 ng/L, or an increase in High Sensitivity Troponin T = 7 ng/L at 2 hours compared to 0 hours (2-0 hours), suggests myocardial injury, and urgent clinical attention is required.    If the 2-0 hours increase is <7 ng/L, a High Sensitivity Troponin T result above gender-specific reference ranges warrants further evaluation.   Recommendations for further evaluation include correlation with clinical decision-making tool (e.g., HEART), a 3rd High Sensitivity Troponin T test 2 hours after the 2nd (a 20% change from baseline would represent concern), admission for observation, close PCC/cardiology follow-up, or urgent outpatient provocative testing.     Ventricular Rate 03/23/2024 85  BPM Final     Atrial Rate 03/23/2024 85  BPM Final     HI Interval 03/23/2024 160  ms Final     QRS Duration 03/23/2024 100  ms Final     QT 03/23/2024 384  ms Final     QTc 03/23/2024 456  ms Final     P Axis 03/23/2024 33  degrees Final     R AXIS 03/23/2024 -56  degrees Final     T Axis 03/23/2024 82  degrees Final     Interpretation ECG 03/23/2024    Final                    Value:Sinus rhythm  Left anterior fascicular block  Minimal voltage criteria for LVH, may be normal variant  Possible Anterior infarct , age undetermined  Abnormal ECG  Unconfirmed report - interpretation of this ECG is  computer generated - see medical record for final interpretation  Confirmed by - EMERGENCY ROOM, PHYSICIAN ([id number]),  BOBBY YANCEY ([id number]) on 3/25/2024 3:01:33 PM       WBC Count 03/23/2024 8.7  4.0 - 11.0 10e3/uL Final     RBC Count 03/23/2024 3.76 (L)  3.80 - 5.20 10e6/uL Final     Hemoglobin 03/23/2024 12.2  11.7 - 15.7 g/dL Final     Hematocrit 03/23/2024 37.2  35.0 - 47.0 % Final     MCV 03/23/2024 99  78 - 100 fL Final     MCH 03/23/2024 32.4  26.5 - 33.0 pg Final     MCHC 03/23/2024 32.8  31.5 - 36.5 g/dL Final     RDW 03/23/2024 14.8  10.0 - 15.0 % Final     Platelet Count 03/23/2024 284  150 - 450 10e3/uL Final     % Neutrophils 03/23/2024 46  % Final     % Lymphocytes 03/23/2024 45  % Final     % Monocytes 03/23/2024 6  % Final     % Eosinophils 03/23/2024 2  % Final     % Basophils 03/23/2024 1  % Final     % Immature Granulocytes 03/23/2024 0  % Final     NRBCs per 100 WBC 03/23/2024 0  <1 /100 Final     Absolute Neutrophils 03/23/2024 4.0  1.6 - 8.3 10e3/uL Final     Absolute Lymphocytes 03/23/2024 4.0  0.8 - 5.3 10e3/uL Final     Absolute Monocytes 03/23/2024 0.5  0.0 - 1.3 10e3/uL Final     Absolute Eosinophils 03/23/2024 0.2  0.0 - 0.7 10e3/uL Final     Absolute Basophils 03/23/2024 0.1  0.0 - 0.2 10e3/uL Final     Absolute Immature Granulocytes 03/23/2024 0.0  <=0.4 10e3/uL Final     Absolute NRBCs 03/23/2024 0.0  10e3/uL Final       Allergies:    Allergies   Allergen Reactions     Fumaric Acid Itching     Haloperidol Swelling     dystonia possibly     Hydromorphone Unknown     Morphine Unknown     Morphine Sulfate (Concentrate) Itching     No Clinical Screening - See Comments Itching     Oxycodone-Acetaminophen Unknown     Penicillins Other (See Comments) and Unknown     Strawberry Extract Hives and Unknown       Family History:   Family History   Problem Relation Age of Onset     Diabetes Mother      Coronary Artery Disease Father      Coronary Artery Disease Early Onset Father      No  Known Problems Brother      No Known Problems Brother      No Known Problems Brother      No Known Problems Brother      No Known Problems Brother      Cirrhosis Sister      Hyperlipidemia Daughter      No Known Problems Daughter      No Known Problems Daughter      No Known Problems Daughter      Emphysema Paternal Aunt      Glaucoma No family hx of      Macular Degeneration No family hx of        Psychosocial history:  reports that she quit smoking about 22 years ago. Her smoking use included cigarettes. She started smoking about 29 years ago. She has a 3.5 pack-year smoking history. She has never used smokeless tobacco. She reports current alcohol use. She reports that she does not use drugs.    Review of systems: negative for, palpitations, exertional chest pain or pressure, paroxysmal nocturnal dyspnea, dyspnea on exertion, orthopnea, lower extremity edema, and syncope or near-syncope    In addition,   General: No change in weight, sleep or appetite.  Normal energy.  No fever or chills  Eyes: Stargardt blindness  ENT: No problems with ears, nose or throat.  No difficulty swallowing.  Resp: No coughing, wheezing or shortness of breath  GI: No nausea, vomiting,  heartburn, abdominal pain, diarrhea, constipation or change in bowel habits  : No urinary frequency or dysuria, bladder or kidney problems  Musculoskeletal: No significant muscle or joint pains  Neurologic: No headaches, numbness, tingling, weakness, problems with balance or coordination  Psychiatric: bipolar  Heme/immune/allergy: No history of bleeding or clotting problems or anemia.    Endocrine: prediabetes, hypothyroid  Skin: No rashes,worrisome lesions or skin problems  Vascular:  No claudication, lifestyle limiting or otherwise; no ischemic rest pain; no non-healing ulcers. No weakness, No loss of sensation        Physical examination  Vitals: /87 (BP Location: Right arm, Patient Position: Chair, Cuff Size: Adult Regular)   Pulse 92   Wt  83 kg (183 lb)   SpO2 94%   BMI 32.16 kg/m    BMI= Body mass index is 32.16 kg/m .    In general, the patient is a pleasant female in no apparent distress.    HEENT: Normiocephalic and atraumatic.     Neck: No adenopathy.  No thyromegaly. Carotids +2/2 bilaterally without bruits.  No jugular venous distension.   Heart:  The PMI is in the 5th ICS in the midclavicular line. There is no heave. Regular rate and rhythm. Normal S1, S2 splits physiologically. No murmur, rub, click, or gallop.    Lungs: Clear to asculation.  No ronchi, wheezes, rales.  No dullness to percussion.   Abdomen: Soft, nontender, nondistended. No organomegaly. No AAA.  No bruits.   Extremities: No clubbing, cyanosis, or edema. The pulses were intact bilaterally.   Neurological: The neurological examination reveal a patient who was oriented to person, place, and time.  The remainder of the examination was nonfocal.    Cardiac tests include:    2/21/24 - Chol 254, , HDL 6    Assessment and Plan    HL - on statin, fibrate  - will increase fibrate and add omega 3  - Patient declined genetic testing  - consider repatha or bempedoic acid  - lipids in 2 months  2. HTN - on lisinopril  3. Prediabetes - diet controlled  4. Hypothyroid - on thyroid replacement    The patient is to return  follow up in 3 months. The patient understood the treatment plan as outlined above.  There were no barriers to learning.      Dhruv Vick MD     Please do not hesitate to contact me if you have any questions/concerns.     Sincerely,     Dhruv Vick MD

## 2024-07-09 NOTE — PATIENT INSTRUCTIONS
New medication prescriptions sent to pharmacy.    Labs next week : hemoglobin A1c and TSH    In 3 months : lab appt for lipid test (cholesterol check) fast for 12 hours prior, then follow up with Cardiology [facility]

## 2024-07-09 NOTE — PROGRESS NOTES
I am delighted to see Dalton Kumar in consultation.The primary encounter diagnosis was Hypertriglyceridemia. Diagnoses of Mixed hyperlipidemia, Type 2 diabetes mellitus without complication, without long-term current use of insulin (H), Hypertension, essential, Hyperlipidemia with target LDL less than 100, Stargardt's disease, and Stage 3a chronic kidney disease (H) were also pertinent to this visit.   As you know, the patient is a 63 year old  female. She   has a past medical history of Bipolar 1 disorder (H), Chronic kidney disease, Diabetes mellitus, type 2 (H) (07/09/2024), Hyperlipidemia, Hypertension, Hypothyroidism (acquired), Lithium toxicity, Retinitis pigmentosa, and Subdural hematoma (H)..    On this visit, the patient states that she has high cholesterol and triglycerides. The patient denies chest pressure/discomfort, dyspnea, palpitations, near-syncope, syncope, orthopnea, paroxysmal nocturnal dyspnea, and lower extermity edema.    The patient's cardiovascular risk factors include hypertension, high cholesterol, and diabetes mellitus.    The following portions of the patient's history were reviewed and updated as appropriate: allergies, current medications, past family history, past medical history, past social history, past surgical history, and the problem list.    PMH: The patient's past medical history includes:    Past Medical History:   Diagnosis Date    Bipolar 1 disorder (H)     Chronic kidney disease     Diabetes mellitus, type 2 (H) 07/09/2024    Hyperlipidemia     Hypertension     Hypothyroidism (acquired)     Lithium toxicity     Retinitis pigmentosa     Subdural hematoma (H)       History reviewed. No pertinent surgical history.    The patient's medications as of the current encounter are:     Current Outpatient Medications   Medication Sig Dispense Refill    acetaminophen (TYLENOL) 500 MG tablet Take 1,000 mg by mouth      albuterol (PROAIR HFA/PROVENTIL HFA/VENTOLIN HFA) 108  (90 Base) MCG/ACT inhaler INHALE 2 PUFFS BY MOUTH EVERY 6 HOURS AS NEEDED FOR SHORTNESS OF BREATH OR WHEEZING 54 g 11    atorvastatin (LIPITOR) 80 MG tablet TAKE 1 TABLET(80 MG) BY MOUTH DAILY 90 tablet 3    buPROPion (WELLBUTRIN XL) 150 MG 24 hr tablet Take 150 mg by mouth      desonide (DESOWEN) 0.05 % cream Apply topically 2 times daily      DiphenhydrAMINE HCl (BENADRYL PO) Take 25 mg by mouth daily as needed      divalproex sodium delayed-release (DEPAKOTE) 500 MG DR tablet Take 500 mg by mouth 2 times daily      fenofibrate (TRICOR) 145 MG tablet Take 1 tablet (145 mg) by mouth daily 30 tablet 3    fenofibrate (TRICOR) 48 MG tablet TAKE 1 TABLET(48 MG) BY MOUTH DAILY 90 tablet 3    fluticasone (FLOVENT DISKUS) 250 MCG/ACT inhaler Inhale 1 puff into the lungs every 12 hours 60 each 4    fluticasone (FLOVENT HFA) 110 MCG/ACT inhaler Inhale 1 puff into the lungs 2 times daily 12 g 4    fluticasone (FLOVENT HFA) 220 MCG/ACT inhaler Inhale 1 puff into the lungs 2 times daily 12 g 11    levothyroxine (SYNTHROID/LEVOTHROID) 137 MCG tablet Take 1 tablet (137 mcg) by mouth daily 90 tablet 3    levothyroxine (SYNTHROID/LEVOTHROID) 150 MCG tablet Take 1 tablet (150 mcg) by mouth daily 90 tablet 1    lisinopril (ZESTRIL) 2.5 MG tablet Take 1 tablet (2.5 mg) by mouth daily 90 tablet 3    omega-3 acid ethyl esters (LOVAZA) 1 g capsule Take 2 capsules (2 g) by mouth 2 times daily 30 capsule 3    paliperidone ER (INVEGA) 6 MG 24 hr tablet Take 6 mg by mouth      pimecrolimus (ELIDEL) 1 % cream       Skin Protectants, Misc. (EUCERIN) cream       topiramate (TOPAMAX) 100 MG tablet Take 100 mg by mouth      vitamin D2 (ERGOCALCIFEROL) 98212 units (1250 mcg) capsule TAKE 1 CAPSULE BY MOUTH 1 TIME A WEEK 4 capsule 0       Labs:     Admission on 03/23/2024, Discharged on 03/23/2024   Component Date Value Ref Range Status    Sodium 03/23/2024 137  135 - 145 mmol/L Final    Reference intervals for this test were updated on 09/26/2023  to more accurately reflect our healthy population. There may be differences in the flagging of prior results with similar values performed with this method. Interpretation of those prior results can be made in the context of the updated reference intervals.     Potassium 03/23/2024 4.8  3.4 - 5.3 mmol/L Final    Carbon Dioxide (CO2) 03/23/2024 22  22 - 29 mmol/L Final    Anion Gap 03/23/2024 13  7 - 15 mmol/L Final    Urea Nitrogen 03/23/2024 25.0 (H)  8.0 - 23.0 mg/dL Final    Creatinine 03/23/2024 1.50 (H)  0.51 - 0.95 mg/dL Final    GFR Estimate 03/23/2024 39 (L)  >60 mL/min/1.73m2 Final    Calcium 03/23/2024 10.5 (H)  8.8 - 10.2 mg/dL Final    Chloride 03/23/2024 102  98 - 107 mmol/L Final    Glucose 03/23/2024 123 (H)  70 - 99 mg/dL Final    Alkaline Phosphatase 03/23/2024 57  40 - 150 U/L Final    Reference intervals for this test were updated on 11/14/2023 to more accurately reflect our healthy population. There may be differences in the flagging of prior results with similar values performed with this method. Interpretation of those prior results can be made in the context of the updated reference intervals.    AST 03/23/2024 51 (H)  0 - 45 U/L Final    Reference intervals for this test were updated on 6/12/2023 to more accurately reflect our healthy population. There may be differences in the flagging of prior results with similar values performed with this method. Interpretation of those prior results can be made in the context of the updated reference intervals.    ALT 03/23/2024 38  0 - 50 U/L Final    Reference intervals for this test were updated on 6/12/2023 to more accurately reflect our healthy population. There may be differences in the flagging of prior results with similar values performed with this method. Interpretation of those prior results can be made in the context of the updated reference intervals.      Protein Total 03/23/2024 7.9  6.4 - 8.3 g/dL Final    Albumin 03/23/2024 4.9  3.5 -  5.2 g/dL Final    Bilirubin Total 03/23/2024 0.3  <=1.2 mg/dL Final    Lipase 03/23/2024 46  13 - 60 U/L Final    Troponin T, High Sensitivity 03/23/2024 8  <=14 ng/L Final    Either a High Sensitivity Troponin T baseline (0 hours) value = 100 ng/L, or an increase in High Sensitivity Troponin T = 7 ng/L at 2 hours compared to 0 hours (2-0 hours), suggests myocardial injury, and urgent clinical attention is required.    If the 2-0 hours increase is <7 ng/L, a High Sensitivity Troponin T result above gender-specific reference ranges warrants further evaluation.   Recommendations for further evaluation include correlation with clinical decision-making tool (e.g., HEART), a 3rd High Sensitivity Troponin T test 2 hours after the 2nd (a 20% change from baseline would represent concern), admission for observation, close PCC/cardiology follow-up, or urgent outpatient provocative testing.    Ventricular Rate 03/23/2024 85  BPM Final    Atrial Rate 03/23/2024 85  BPM Final    IA Interval 03/23/2024 160  ms Final    QRS Duration 03/23/2024 100  ms Final    QT 03/23/2024 384  ms Final    QTc 03/23/2024 456  ms Final    P Axis 03/23/2024 33  degrees Final    R AXIS 03/23/2024 -56  degrees Final    T Axis 03/23/2024 82  degrees Final    Interpretation ECG 03/23/2024    Final                    Value:Sinus rhythm  Left anterior fascicular block  Minimal voltage criteria for LVH, may be normal variant  Possible Anterior infarct , age undetermined  Abnormal ECG  Unconfirmed report - interpretation of this ECG is computer generated - see medical record for final interpretation  Confirmed by - EMERGENCY ROOM, PHYSICIAN ([id number]),  BOBBY YANCEY ([id number]) on 3/25/2024 3:01:33 PM      WBC Count 03/23/2024 8.7  4.0 - 11.0 10e3/uL Final    RBC Count 03/23/2024 3.76 (L)  3.80 - 5.20 10e6/uL Final    Hemoglobin 03/23/2024 12.2  11.7 - 15.7 g/dL Final    Hematocrit 03/23/2024 37.2  35.0 - 47.0 % Final    MCV 03/23/2024 99  78 - 100 fL  Final    MCH 03/23/2024 32.4  26.5 - 33.0 pg Final    MCHC 03/23/2024 32.8  31.5 - 36.5 g/dL Final    RDW 03/23/2024 14.8  10.0 - 15.0 % Final    Platelet Count 03/23/2024 284  150 - 450 10e3/uL Final    % Neutrophils 03/23/2024 46  % Final    % Lymphocytes 03/23/2024 45  % Final    % Monocytes 03/23/2024 6  % Final    % Eosinophils 03/23/2024 2  % Final    % Basophils 03/23/2024 1  % Final    % Immature Granulocytes 03/23/2024 0  % Final    NRBCs per 100 WBC 03/23/2024 0  <1 /100 Final    Absolute Neutrophils 03/23/2024 4.0  1.6 - 8.3 10e3/uL Final    Absolute Lymphocytes 03/23/2024 4.0  0.8 - 5.3 10e3/uL Final    Absolute Monocytes 03/23/2024 0.5  0.0 - 1.3 10e3/uL Final    Absolute Eosinophils 03/23/2024 0.2  0.0 - 0.7 10e3/uL Final    Absolute Basophils 03/23/2024 0.1  0.0 - 0.2 10e3/uL Final    Absolute Immature Granulocytes 03/23/2024 0.0  <=0.4 10e3/uL Final    Absolute NRBCs 03/23/2024 0.0  10e3/uL Final       Allergies:    Allergies   Allergen Reactions    Fumaric Acid Itching    Haloperidol Swelling     dystonia possibly    Hydromorphone Unknown    Morphine Unknown    Morphine Sulfate (Concentrate) Itching    No Clinical Screening - See Comments Itching    Oxycodone-Acetaminophen Unknown    Penicillins Other (See Comments) and Unknown    Strawberry Extract Hives and Unknown       Family History:   Family History   Problem Relation Age of Onset    Diabetes Mother     Coronary Artery Disease Father     Coronary Artery Disease Early Onset Father     No Known Problems Brother     No Known Problems Brother     No Known Problems Brother     No Known Problems Brother     No Known Problems Brother     Cirrhosis Sister     Hyperlipidemia Daughter     No Known Problems Daughter     No Known Problems Daughter     No Known Problems Daughter     Emphysema Paternal Aunt     Glaucoma No family hx of     Macular Degeneration No family hx of        Psychosocial history:  reports that she quit smoking about 22 years ago.  Her smoking use included cigarettes. She started smoking about 29 years ago. She has a 3.5 pack-year smoking history. She has never used smokeless tobacco. She reports current alcohol use. She reports that she does not use drugs.    Review of systems: negative for, palpitations, exertional chest pain or pressure, paroxysmal nocturnal dyspnea, dyspnea on exertion, orthopnea, lower extremity edema, and syncope or near-syncope    In addition,   General: No change in weight, sleep or appetite.  Normal energy.  No fever or chills  Eyes: Stargardt blindness  ENT: No problems with ears, nose or throat.  No difficulty swallowing.  Resp: No coughing, wheezing or shortness of breath  GI: No nausea, vomiting,  heartburn, abdominal pain, diarrhea, constipation or change in bowel habits  : No urinary frequency or dysuria, bladder or kidney problems  Musculoskeletal: No significant muscle or joint pains  Neurologic: No headaches, numbness, tingling, weakness, problems with balance or coordination  Psychiatric: bipolar  Heme/immune/allergy: No history of bleeding or clotting problems or anemia.    Endocrine: prediabetes, hypothyroid  Skin: No rashes,worrisome lesions or skin problems  Vascular:  No claudication, lifestyle limiting or otherwise; no ischemic rest pain; no non-healing ulcers. No weakness, No loss of sensation        Physical examination  Vitals: /87 (BP Location: Right arm, Patient Position: Chair, Cuff Size: Adult Regular)   Pulse 92   Wt 83 kg (183 lb)   SpO2 94%   BMI 32.16 kg/m    BMI= Body mass index is 32.16 kg/m .    In general, the patient is a pleasant female in no apparent distress.    HEENT: Normiocephalic and atraumatic.     Neck: No adenopathy.  No thyromegaly. Carotids +2/2 bilaterally without bruits.  No jugular venous distension.   Heart:  The PMI is in the 5th ICS in the midclavicular line. There is no heave. Regular rate and rhythm. Normal S1, S2 splits physiologically. No murmur, rub,  click, or gallop.    Lungs: Clear to asculation.  No ronchi, wheezes, rales.  No dullness to percussion.   Abdomen: Soft, nontender, nondistended. No organomegaly. No AAA.  No bruits.   Extremities: No clubbing, cyanosis, or edema. The pulses were intact bilaterally.   Neurological: The neurological examination reveal a patient who was oriented to person, place, and time.  The remainder of the examination was nonfocal.    Cardiac tests include:    2/21/24 - Chol 254, , HDL 6    Assessment and Plan    HL - on statin, fibrate  - will increase fibrate and add omega 3  - Patient declined genetic testing  - consider repatha or bempedoic acid  - lipids in 2 months  2. HTN - on lisinopril  3. Prediabetes - diet controlled  4. Hypothyroid - on thyroid replacement    The patient is to return  follow up in 3 months. The patient understood the treatment plan as outlined above.  There were no barriers to learning.      Dhruv Vick MD

## 2024-07-09 NOTE — NURSING NOTE
Chief Complaint   Patient presents with    New Patient     new - referral from PCP [place]       Vitals were taken, medications reconciled.     Ozzie Campbell, Visit Facilitator    10:38 AM

## 2024-07-10 ENCOUNTER — TELEPHONE (OUTPATIENT)
Dept: FAMILY MEDICINE | Facility: CLINIC | Age: 64
End: 2024-07-10
Payer: MEDICARE

## 2024-07-10 RX ORDER — FLUTICASONE PROPIONATE 220 UG/1
1 AEROSOL, METERED RESPIRATORY (INHALATION) 2 TIMES DAILY
Qty: 12 G | Refills: 11 | Status: SHIPPED | OUTPATIENT
Start: 2024-07-10

## 2024-07-10 RX ORDER — FENOFIBRATE 145 MG/1
145 TABLET, COATED ORAL DAILY
Qty: 90 TABLET | OUTPATIENT
Start: 2024-07-10

## 2024-07-11 ENCOUNTER — TELEPHONE (OUTPATIENT)
Dept: FAMILY MEDICINE | Facility: CLINIC | Age: 64
End: 2024-07-11
Payer: MEDICARE

## 2024-07-11 NOTE — TELEPHONE ENCOUNTER
Pt     fluticasone (FLOVENT HFA) 110 MCG/ACT inhaler  1 puff, 2 TIMES DAILY            fluticasone (FLOVENT HFA) 220 MCG/ACT inhaler            Isn't covered by insurance, please send new script    Preferred alternative is ARNUITYELLIPTA or PULMICORETFLEXHALER

## 2024-07-14 NOTE — TELEPHONE ENCOUNTER
PA Initiation    Medication: FLUTICASONE PROPIONATE  MCG/ACT IN AERO  Insurance Company: [employer] - Phone [phone] Fax [phone]  Pharmacy Filling the Rx: [id number] [place] [address] AT [place] [address]  Filling Pharmacy Phone: [phone]  Filling Pharmacy Fax:    Start Date: 7/14/2024

## 2024-07-15 NOTE — TELEPHONE ENCOUNTER
Prior Authorization Approval    Medication: FLUTICASONE PROPIONATE  MCG/ACT IN AERO  Authorization Effective Date: 6/30/2024  Authorization Expiration Date: 12/31/2099  Insurance Company: [employer] - Phone [phone] Fax [phone]  Which Pharmacy is filling the prescription: [id number] [address] [facility] [place] [address]  Pharmacy Notified: YES  Patient Notified: Instructed pharmacy to notify patient once order is ready.

## 2024-07-16 ENCOUNTER — LAB (OUTPATIENT)
Dept: LAB | Facility: CLINIC | Age: 64
End: 2024-07-16
Payer: MEDICARE

## 2024-07-16 DIAGNOSIS — F31.76 BIPOLAR AFFECTIVE DISORDER, DEPRESSED, IN FULL REMISSION (H): ICD-10-CM

## 2024-07-16 DIAGNOSIS — E78.5 HYPERLIPIDEMIA WITH TARGET LDL LESS THAN 100: ICD-10-CM

## 2024-07-16 DIAGNOSIS — F31.9 BIPOLAR I DISORDER (H): ICD-10-CM

## 2024-07-16 DIAGNOSIS — E11.9 TYPE 2 DIABETES MELLITUS WITHOUT COMPLICATION, WITHOUT LONG-TERM CURRENT USE OF INSULIN (H): ICD-10-CM

## 2024-07-16 DIAGNOSIS — E78.2 MIXED HYPERLIPIDEMIA: ICD-10-CM

## 2024-07-16 DIAGNOSIS — N18.30 STAGE 3 CHRONIC KIDNEY DISEASE, UNSPECIFIED WHETHER STAGE 3A OR 3B CKD (H): ICD-10-CM

## 2024-07-16 DIAGNOSIS — E78.1 HYPERTRIGLYCERIDEMIA: ICD-10-CM

## 2024-07-16 LAB
ALBUMIN SERPL BCG-MCNC: 4.5 G/DL (ref 3.5–5.2)
ALP SERPL-CCNC: 79 U/L (ref 40–150)
ALT SERPL W P-5'-P-CCNC: 25 U/L (ref 0–50)
AST SERPL W P-5'-P-CCNC: 29 U/L (ref 0–45)
BILIRUB DIRECT SERPL-MCNC: <0.2 MG/DL (ref 0–0.3)
BILIRUB SERPL-MCNC: 0.3 MG/DL
CHOLEST SERPL-MCNC: 244 MG/DL
FASTING STATUS PATIENT QL REPORTED: YES
HBA1C MFR BLD: 11.6 % (ref 0–5.6)
HDLC SERPL-MCNC: 9 MG/DL
LDLC SERPL CALC-MCNC: ABNORMAL MG/DL
LDLC SERPL DIRECT ASSAY-MCNC: 76 MG/DL
NONHDLC SERPL-MCNC: 235 MG/DL
PROT SERPL-MCNC: 8 G/DL (ref 6.4–8.3)
T4 FREE SERPL-MCNC: 1.37 NG/DL (ref 0.9–1.7)
TRIGL SERPL-MCNC: 823 MG/DL
TSH SERPL DL<=0.005 MIU/L-ACNC: 16.3 UIU/ML (ref 0.3–4.2)
VALPROATE SERPL-MCNC: 39.4 UG/ML
VIT D+METAB SERPL-MCNC: 18 NG/ML (ref 20–50)

## 2024-07-16 PROCEDURE — 80061 LIPID PANEL: CPT

## 2024-07-16 PROCEDURE — 80164 ASSAY DIPROPYLACETIC ACD TOT: CPT

## 2024-07-16 PROCEDURE — 84439 ASSAY OF FREE THYROXINE: CPT

## 2024-07-16 PROCEDURE — 83036 HEMOGLOBIN GLYCOSYLATED A1C: CPT

## 2024-07-16 PROCEDURE — 83721 ASSAY OF BLOOD LIPOPROTEIN: CPT | Mod: 59

## 2024-07-16 PROCEDURE — 36415 COLL VENOUS BLD VENIPUNCTURE: CPT

## 2024-07-16 PROCEDURE — 80076 HEPATIC FUNCTION PANEL: CPT

## 2024-07-16 PROCEDURE — 84443 ASSAY THYROID STIM HORMONE: CPT

## 2024-07-16 PROCEDURE — 82306 VITAMIN D 25 HYDROXY: CPT | Mod: GZ

## 2024-07-29 ENCOUNTER — VIRTUAL VISIT (OUTPATIENT)
Dept: FAMILY MEDICINE | Facility: CLINIC | Age: 64
End: 2024-07-29
Payer: MEDICARE

## 2024-07-29 DIAGNOSIS — E11.65 TYPE 2 DIABETES MELLITUS WITH HYPERGLYCEMIA, WITHOUT LONG-TERM CURRENT USE OF INSULIN (H): Primary | ICD-10-CM

## 2024-07-29 DIAGNOSIS — E03.9 HYPOTHYROIDISM, UNSPECIFIED TYPE: ICD-10-CM

## 2024-07-29 DIAGNOSIS — N89.8 VAGINAL ITCHING: ICD-10-CM

## 2024-07-29 PROBLEM — R73.03 PREDIABETES: Status: RESOLVED | Noted: 2021-06-13 | Resolved: 2024-07-29

## 2024-07-29 PROCEDURE — 99442 PR PHYSICIAN TELEPHONE EVALUATION 11-20 MIN: CPT | Mod: 93 | Performed by: PHYSICIAN ASSISTANT

## 2024-07-29 RX ORDER — LEVOTHYROXINE SODIUM 150 UG/1
150 TABLET ORAL DAILY
Qty: 90 TABLET | Refills: 1 | Status: CANCELLED | OUTPATIENT
Start: 2024-07-29

## 2024-07-29 RX ORDER — METFORMIN HCL 500 MG
TABLET, EXTENDED RELEASE 24 HR ORAL
Qty: 360 TABLET | Refills: 1 | Status: SHIPPED | OUTPATIENT
Start: 2024-07-29

## 2024-07-29 RX ORDER — LEVOTHYROXINE SODIUM 175 UG/1
175 TABLET ORAL DAILY
Qty: 90 TABLET | Refills: 1 | Status: SHIPPED | OUTPATIENT
Start: 2024-07-29

## 2024-07-29 RX ORDER — LEVOTHYROXINE SODIUM 137 UG/1
137 TABLET ORAL DAILY
Qty: 90 TABLET | Refills: 3 | Status: CANCELLED | OUTPATIENT
Start: 2024-07-29

## 2024-07-29 RX ORDER — FLUCONAZOLE 150 MG/1
150 TABLET ORAL ONCE
Qty: 2 TABLET | Refills: 0 | Status: SHIPPED | OUTPATIENT
Start: 2024-07-29 | End: 2024-07-29

## 2024-07-29 NOTE — PROGRESS NOTES
"Dalton is a 63 year old who is being evaluated via a billable telephone visit.    What phone number would you like to be contacted at? [phone]   How would you like to obtain your AVS? Mail a copy  Originating Location (pt. Location): Home    Distant Location (provider location):  Off-site    Assessment & Plan     Type 2 diabetes mellitus with hyperglycemia, without long-term current use of insulin (H) - reviewed new diabetes diagnosis, will start treatment with metformin, increase taper. Referral to meet with diabetes ed. Follow up in 3months with PCP.  - metFORMIN (GLUCOPHAGE XR) 500 MG 24 hr tablet  Dispense: 360 tablet; Refill: 1  - Adult Diabetes Education  Referral  - **Hemoglobin A1c FUTURE 3mo    Vaginal itching - presumptive based on symptoms & new diabetes diagnosis, will treat with fluconazole.  - fluconazole (DIFLUCAN) 150 MG tablet  Dispense: 2 tablet; Refill: 0    Hypothyroidism, unspecified type - recent TSH still not in appropriate range, increase dose from 150mcg to 175mcg. Follow up with TSH in 6-8 weeks.  - levothyroxine (SYNTHROID/LEVOTHROID) 175 MCG tablet  Dispense: 90 tablet; Refill: 1  - TSH with free T4 reflex    BMI  Estimated body mass index is 32.16 kg/m  as calculated from the following:    Height as of 2/21/24: 1.607 m (5' 3.25\").    Weight as of 7/9/24: 83 kg (183 lb).     The longitudinal plan of care for the diagnosis(es)/condition(s) as documented were addressed during this visit. Due to the added complexity in care, I will continue to support Dalton in the subsequent management and with ongoing continuity of care.        Subjective   Dalton is a 63 year old, presenting for the following health issues:  Vaginal Itching (/Expiercing for 1wk, just itching, using a cream) and Diabetes (/Had labs done 2 wks done, would like to review as Glucose levels is high)    HPI     Vaginal itching externally for past 1 week  Using cream that her daughter had sent  Had labs " with cardiology and A1C was 11.6  Previously only prediabetic        Objective    Vitals - Patient Reported  Systolic (Patient Reported):  (Unknown)  Diastolic (Patient Reported):  (Unknown)  Weight (Patient Reported):  (Unknown)  Height (Patient Reported):  (Unknown)  Pain Score: No Pain (0)    Physical Exam   General: Alert and no distress //Respiratory: No audible wheeze, cough, or shortness of breath // Psychiatric:  Appropriate affect, tone, and pace of words        Phone call duration: 16 minutes  Signed Electronically by: Alyson De La Rosa PA-C

## 2024-08-27 ENCOUNTER — PATIENT OUTREACH (OUTPATIENT)
Dept: CARE COORDINATION | Facility: CLINIC | Age: 64
End: 2024-08-27
Payer: MEDICARE

## 2024-08-30 ENCOUNTER — TELEPHONE (OUTPATIENT)
Dept: FAMILY MEDICINE | Facility: CLINIC | Age: 64
End: 2024-08-30
Payer: MEDICARE

## 2024-08-30 DIAGNOSIS — E11.9 TYPE 2 DIABETES MELLITUS WITHOUT COMPLICATION, WITHOUT LONG-TERM CURRENT USE OF INSULIN (H): Primary | ICD-10-CM

## 2024-08-30 NOTE — TELEPHONE ENCOUNTER
General Call    Contacts       Contact Date/Time Type Contact Phone/Fax    08/30/2024 07:31 AM CDT Phone (Incoming) Dalton Kumar (Self) [phone] (M)          Reason for Call:  Metformin question    What are your questions or concerns:      Patient had a virtual visit with clinician Alyson De La Rosa PA-C on 7/29/2024 and was started on Metformin about 3 days ago. So far, she has had severe diarrhea for 2 days. Patient also thew up yesterday. Patient is wondering if patient should decrease the Metformin medication or continue taking it?    Date of last appointment with provider: 7/29/2024    Could we send this information to you in [place] or would you prefer to receive a phone call?:   Patient would prefer a phone call   Okay to leave a detailed message?: Yes at Cell number on file:    Telephone Information:   Mobile [phone]     Routing message to Alyson De La Rosa PA-C to review and advise.    ALIN GrafN, RN   [facility]

## 2024-08-30 NOTE — TELEPHONE ENCOUNTER
Stop metformin given side effects, will write urgent referral to [facility] to discuss other medication options  Alyosn De La Rosa PA-C

## 2024-08-30 NOTE — TELEPHONE ENCOUNTER
Writer spoke to patient regarding clinician's message below. Alyson's message relayed to patient. Patient will await a phone call from  for [facility]. Patient will call back by Thursday, if she has not received any phone calls.    Patient verbalizes understanding, agrees with plan and has no further questions.    No further action needed at this time.    ALIN GrafN, RN   [facility]

## 2024-09-08 NOTE — PROGRESS NOTES
Medication Therapy Management (MTM) Encounter    ASSESSMENT:                            Medication Adherence/Access: No issues identified    Diabetes:   Patient is not meeting A1c goal of < 7%.  Self monitoring of blood glucose is not possible due to blindness.    Patient would benefit from : Restart of metformin 500mg er tabs --but due to ckd and diarrhea issues --max. Daily dose is 500mg. Bid ;  and lifestyle changes--watch carbs, avoid freezy pops.      PLAN:                              1. Keep watching sugars /carbs in what she eats --limits bread, pasta, rice potatos.   Off freezy pops now.    Lets restart Metformin ER 500mg. Tabs--restart [id number] after dinner /day --x 1 week - if well tolerated --then week-2 go to 1 tablet after bfast and dinner daily.    Donot exceed 2 tabs/day .         Follow-up: Return in about 1 month (around 10/9/2024), or @ 3 PM via telephone, for Medication Therapy Management Visit.        SUBJECTIVE/OBJECTIVE:                          Dalton Kumar is a 63 year old female  called for an initial visit. She was referred to me from Alyson De La Rosa PA-C.      Reason for visit:   DM2 new diganosis, severe diarrhea with metformin - a1c 11.6     Allergies/ADRs: Reviewed in chart  Past Medical History: Per patient ; she is blind --not checking blood sugars .   Tobacco: She reports that she quit smoking about 22 years ago. Her smoking use included cigarettes. She started smoking about 29 years ago. She has a 3.5 pack-year smoking history. She has never used smokeless tobacco.  Alcohol: not currently using  Social: legally blind  Medication Adherence/Access: no issues reported    Diabetes -new diagnosis July 2024.  Metformin 500mg. Er tabs --started 3-4 weeks ago- slowly worked up to 2 tabs am and pm daily -- diarrhea occurred at this daily high dose . Off drug now for several days - diarrhea has resolved using otc imodium ad.    Patient is experiencing the following side effects:  diarrhea  Current diabetes symptoms: none  Blood sugar monitoring: cannot check as she is blind.  Diet/Exercise:  Was eating a lot of freezy pops reason why her  --A1c went. Up. Has stopped them now.      Eye exam in the last 12 months? No (legally blind).  Foot exam: due    Lab Results   Component Value Date    A1C 11.6 07/16/2024    A1C 6.5 02/21/2024    A1C 5.6 10/28/2022    A1C 6.0 05/29/2021       Last Comprehensive Metabolic Panel:  Sodium   Date Value Ref Range Status   03/23/2024 137 135 - 145 mmol/L Final     Comment:     Reference intervals for this test were updated on 09/26/2023 to more accurately reflect our healthy population. There may be differences in the flagging of prior results with similar values performed with this method. Interpretation of those prior results can be made in the context of the updated reference intervals.    05/07/2021 136 133 - 144 mmol/L Final     Potassium   Date Value Ref Range Status   03/23/2024 4.8 3.4 - 5.3 mmol/L Final   08/10/2022 4.4 3.4 - 5.3 mmol/L Final   05/07/2021 4.2 3.4 - 5.3 mmol/L Final     Chloride   Date Value Ref Range Status   03/23/2024 102 98 - 107 mmol/L Final   08/10/2022 106 94 - 109 mmol/L Final   05/07/2021 107 94 - 109 mmol/L Final     Carbon Dioxide   Date Value Ref Range Status   05/07/2021 17 (L) 20 - 32 mmol/L Final     Carbon Dioxide (CO2)   Date Value Ref Range Status   03/23/2024 22 22 - 29 mmol/L Final   08/10/2022 19 (L) 20 - 32 mmol/L Final     Anion Gap   Date Value Ref Range Status   03/23/2024 13 7 - 15 mmol/L Final   08/10/2022 13 3 - 14 mmol/L Final   05/07/2021 11 3 - 14 mmol/L Final     Glucose   Date Value Ref Range Status   03/23/2024 123 (H) 70 - 99 mg/dL Final   08/10/2022 133 (H) 70 - 99 mg/dL Final   05/07/2021 106 (H) 70 - 99 mg/dL Final     Urea Nitrogen   Date Value Ref Range Status   03/23/2024 25.0 (H) 8.0 - 23.0 mg/dL Final   08/10/2022 30 7 - 30 mg/dL Final   05/07/2021 27 7 - 30 mg/dL Final     Creatinine   Date  Value Ref Range Status   03/23/2024 1.50 (H) 0.51 - 0.95 mg/dL Final   05/07/2021 0.98 0.52 - 1.04 mg/dL Final     GFR Estimate   Date Value Ref Range Status   03/23/2024 39 (L) >60 mL/min/1.73m2 Final   05/07/2021 63 >60 mL/min/[1.73_m2] Final     Comment:     Non  GFR Calc  Starting 12/18/2018, serum creatinine based estimated GFR (eGFR) will be   calculated using the Chronic Kidney Disease Epidemiology Collaboration   (CKD-EPI) equation.       Calcium   Date Value Ref Range Status   03/23/2024 10.5 (H) 8.8 - 10.2 mg/dL Final   05/07/2021 9.6 8.5 - 10.1 mg/dL Final     Bilirubin Total   Date Value Ref Range Status   07/16/2024 0.3 <=1.2 mg/dL Final   05/07/2021 0.2 0.2 - 1.3 mg/dL Final     Alkaline Phosphatase   Date Value Ref Range Status   07/16/2024 79 40 - 150 U/L Final   05/07/2021 65 40 - 150 U/L Final     ALT   Date Value Ref Range Status   07/16/2024 25 0 - 50 U/L Final   05/07/2021 38 0 - 50 U/L Final     AST   Date Value Ref Range Status   07/16/2024 29 0 - 45 U/L Final   05/07/2021 23 0 - 45 U/L Final       -----------------------------------------------------------------------------------------------------------------    I spent 20 minutes with this patient today. All changes were made via collaborative practice agreement with Yani Max MD. A copy of the visit note was provided to the patient's provider(s).    A summary of these recommendations was sent via [employer].    Marcia Felipe [facility].  Medication Therapy Management Provider  [phone]      Telemedicine Visit Details  Type of service:  Telephone visit  Start Time: 4:11 PM  End Time: 4:31 PM     Medication Therapy Recommendations  Diabetes mellitus, type 2 (H)    Current Medication: metFORMIN (GLUCOPHAGE XR) 500 MG 24 hr tablet   Rationale: Dose too high - Dosage too high - Safety   Recommendation: Decrease Dose - restart 1 tab after dinner week-1 if tolerated , add 1 more after bfast daily , no more than 2/day.    Status: Accepted per CPA

## 2024-09-09 ENCOUNTER — VIRTUAL VISIT (OUTPATIENT)
Dept: PHARMACY | Facility: CLINIC | Age: 64
End: 2024-09-09
Payer: MEDICARE

## 2024-09-09 DIAGNOSIS — E11.9 TYPE 2 DIABETES MELLITUS WITHOUT COMPLICATION, WITHOUT LONG-TERM CURRENT USE OF INSULIN (H): Primary | ICD-10-CM

## 2024-09-09 PROCEDURE — 99207 PR NO CHARGE LOS: CPT | Mod: 93 | Performed by: PHARMACIST

## 2024-09-09 NOTE — PATIENT INSTRUCTIONS
"Recommendations from today's MTM visit:                                                    MTM (medication therapy management) is a service provided by a clinical pharmacist designed to help you get the most of out of your medicines.   Today we reviewed what your medicines are for, how to know if they are working, that your medicines are safe and how to make your medicine regimen as easy as possible.      1. Keep watching sugars /carbs in what she eats --limits bread, pasta, rice potatos.   Off freezy pops now.    Lets restart Metformin ER 500mg. Tabs--restart [id number] after dinner /day --x 1 week - if well tolerated --then week-2 go to 1 tablet after bfast and dinner daily.    Donot exceed 2 tabs/day .         Follow-up: Return in about 1 month (around 10/9/2024), or @ 3 PM via telephone, for Medication Therapy Management Visit.    It was great speaking with you today.  I value your experience and would be very thankful for your time in providing feedback in our clinic survey. In the next few days, you may receive an email or text message from [employer] with a link to a survey related to your  clinical pharmacist.\"     To schedule another MTM appointment, please call the clinic directly or you may call the MTM scheduling line at [phone] or toll-free at [phone].     My Clinical Pharmacist's contact information:                                                      Please feel free to contact me with any questions or concerns you have.      Marcia Felipe Rp.  Medication Therapy Management Provider  [phone]    "

## 2024-09-09 NOTE — Clinical Note
Alyson Lomeli--thx for mtm referral-- due to tolerability and ckd- ill restart her metformin but since she is off the freezy pops that elevated her Blood sugars - I think all she needs is 500mg 2 x day --ill f/up in 1 month see if she is tolerating it ?  Xuan-Marcia

## 2024-09-25 DIAGNOSIS — E78.5 HYPERLIPIDEMIA WITH TARGET LDL LESS THAN 100: ICD-10-CM

## 2024-09-25 DIAGNOSIS — E78.1 HYPERTRIGLYCERIDEMIA: Primary | ICD-10-CM

## 2024-10-20 ENCOUNTER — HEALTH MAINTENANCE LETTER (OUTPATIENT)
Age: 64
End: 2024-10-20

## 2024-10-28 ENCOUNTER — TELEPHONE (OUTPATIENT)
Dept: PHARMACY | Facility: CLINIC | Age: 64
End: 2024-10-28
Payer: MEDICARE

## 2024-10-28 DIAGNOSIS — E11.9 TYPE 2 DIABETES MELLITUS WITHOUT COMPLICATION, WITHOUT LONG-TERM CURRENT USE OF INSULIN (H): Primary | ICD-10-CM

## 2024-10-28 DIAGNOSIS — E78.1 HYPERTRIGLYCERIDEMIA: Primary | ICD-10-CM

## 2024-10-28 DIAGNOSIS — E78.5 HYPERLIPIDEMIA WITH TARGET LDL LESS THAN 100: ICD-10-CM

## 2024-10-28 NOTE — TELEPHONE ENCOUNTER
----- Message from Liliam CONCHITA sent at 10/28/2024 12:51 PM CDT -----  Regarding: call pt regarding missed appt Oct 9th  Pt called and missed her Oct 9 appt and needs to talk to you today because she is leaving [facility] on Nov 7th.  Can you please call her and let me know if you want me to schedule an appt.    Thanks  Liliam Swift MT   [phone]  ------------------------------------------------------------------------------  Mt called her --she is taking 1 metformin  am and pm     Have A1c drawn 12-2-24. 1;45pm.     Mt will f/up telephone call 12-3-24 at 3;30pm  to discuss A1c result.    Marcia Felipe [facility].  Medication Therapy Management Provider  [phone]  '

## 2024-10-29 DIAGNOSIS — E78.1 HYPERTRIGLYCERIDEMIA: ICD-10-CM

## 2024-10-29 DIAGNOSIS — E78.5 HYPERLIPIDEMIA WITH TARGET LDL LESS THAN 100: ICD-10-CM

## 2024-10-31 RX ORDER — FENOFIBRATE 145 MG/1
145 TABLET, COATED ORAL DAILY
Qty: 30 TABLET | Refills: 0 | Status: SHIPPED | OUTPATIENT
Start: 2024-10-31

## 2024-10-31 RX ORDER — FENOFIBRATE 145 MG/1
145 TABLET, COATED ORAL DAILY
Qty: 90 TABLET | OUTPATIENT
Start: 2024-10-31

## 2024-10-31 NOTE — TELEPHONE ENCOUNTER
Signed Today (10/31/2024):   fenofibrate (TRICOR) 145 MG tablet   Sig: Take 1 tablet (145 mg) by mouth daily.   Disp: 30 tablet    Refills: 0   Signed by: Dhruv Vick MD

## 2024-11-17 ENCOUNTER — OFFICE VISIT (OUTPATIENT)
Dept: URGENT CARE | Facility: URGENT CARE | Age: 64
End: 2024-11-17
Payer: MEDICARE

## 2024-11-17 VITALS
HEART RATE: 89 BPM | SYSTOLIC BLOOD PRESSURE: 128 MMHG | OXYGEN SATURATION: 95 % | TEMPERATURE: 97 F | DIASTOLIC BLOOD PRESSURE: 90 MMHG | RESPIRATION RATE: 16 BRPM

## 2024-11-17 DIAGNOSIS — L30.9 DERMATITIS: Primary | ICD-10-CM

## 2024-11-17 PROCEDURE — 99213 OFFICE O/P EST LOW 20 MIN: CPT | Performed by: FAMILY MEDICINE

## 2024-11-17 RX ORDER — CLOBETASOL PROPIONATE 0.5 MG/G
CREAM TOPICAL
Qty: 15 G | Refills: 0 | Status: SHIPPED | OUTPATIENT
Start: 2024-11-17

## 2024-11-17 ASSESSMENT — ENCOUNTER SYMPTOMS: FEVER: 0

## 2024-11-17 NOTE — PROGRESS NOTES
Assessment & Plan     Dermatitis  Recommend topical clobetasol, use hand creams.  Do not suspect cellulitis.  Advised to wear gloves when washing dishes.  If not better after a week recommend dermatology evaluation.  - clobetasol propionate (TEMOVATE) 0.05 % external cream  Dispense: 15 g; Refill: 0             Return in about 1 week (around 11/24/2024) for If symptoms do not improve or gets worse..    Richard Matias MD  [facility] [place]    Karen Hoffman is a 64 year old female who presents to clinic today for the following health issues:  Chief Complaint   Patient presents with    Urgent Care    Derm Problem    Hand Problem     Patient presents with a rash on both hands that is itchy,cracked and painful.         HPI    Patient pleasant 64-year-old female history of hypertension, hypothyroidism and diabetes who presents to urgent care with 1 day history of dry itching and cracked hands.  No recent chemical exposure.  No change of detergents or soaps.  Denies excessive handwashing.      Review of Systems   Constitutional:  Negative for fever.   Skin:  Positive for rash.           Objective    BP (!) 128/90 (BP Location: Right arm)   Pulse 89   Temp 97  F (36.1  C) (Temporal)   Resp 16   SpO2 95%   Physical Exam  Skin:     Comments: Bilateral hands are very dry, fissures with bleeding.  No fluid-filled papules on the lateral borders of fingers.    No increased warmth or purulence.

## 2024-12-02 ENCOUNTER — LAB (OUTPATIENT)
Dept: LAB | Facility: CLINIC | Age: 64
End: 2024-12-02
Payer: MEDICARE

## 2024-12-02 DIAGNOSIS — E78.5 HYPERLIPIDEMIA WITH TARGET LDL LESS THAN 100: ICD-10-CM

## 2024-12-02 DIAGNOSIS — E78.1 HYPERTRIGLYCERIDEMIA: ICD-10-CM

## 2024-12-02 DIAGNOSIS — N18.30 STAGE 3 CHRONIC KIDNEY DISEASE, UNSPECIFIED WHETHER STAGE 3A OR 3B CKD (H): ICD-10-CM

## 2024-12-02 DIAGNOSIS — E03.9 HYPOTHYROIDISM, UNSPECIFIED TYPE: ICD-10-CM

## 2024-12-02 DIAGNOSIS — E11.9 TYPE 2 DIABETES MELLITUS WITHOUT COMPLICATION, WITHOUT LONG-TERM CURRENT USE OF INSULIN (H): ICD-10-CM

## 2024-12-02 LAB
EST. AVERAGE GLUCOSE BLD GHB EST-MCNC: 134 MG/DL
HBA1C MFR BLD: 6.3 % (ref 0–5.6)

## 2024-12-02 PROCEDURE — 82043 UR ALBUMIN QUANTITATIVE: CPT

## 2024-12-02 PROCEDURE — 83036 HEMOGLOBIN GLYCOSYLATED A1C: CPT

## 2024-12-02 PROCEDURE — 83721 ASSAY OF BLOOD LIPOPROTEIN: CPT | Mod: 59

## 2024-12-02 PROCEDURE — 80061 LIPID PANEL: CPT

## 2024-12-02 PROCEDURE — 84439 ASSAY OF FREE THYROXINE: CPT

## 2024-12-02 PROCEDURE — 84443 ASSAY THYROID STIM HORMONE: CPT

## 2024-12-02 PROCEDURE — 82570 ASSAY OF URINE CREATININE: CPT

## 2024-12-02 NOTE — PROGRESS NOTES
Medication Therapy Management (MTM) Encounter    ASSESSMENT:                            Medication Adherence/Access: missing doses levo-t daily.     Diabetes:   Patient is now meeting A1c goal of < 7%.  Self monitoring of blood glucose is not possible due to blindness.    Patient would benefit from : Stay on 2x Metformin 500mg er tabs/day now .  and lifestyle changes--watch carbs, avoid freezy pops.    Hypothyroidism:   Last TSH is above normal range 41.30 . However, last T4 was 0.89. Patient would benefit from no changes at this time and rechecking thyroid labs in 12 weeks.  Patient will re-commit to taking her levo-t daily and not miss any doses.     Vitamin D3: is not at goal of 50-75ng/mL. Pt would benefit from initiation of OTC vitamin D3 5000IU daily. Recheck lab march 2025.       PLAN:                              1. Congrats A1c now back to goal at 6.3%.  continue 2 tabs /day of Metformin.     2. Please remember to take your thyroid pill--levothyroxine 175mcg. Each morning on empty stomach with glass of water --try not to miss any doses , wait 30 minutes before taking any food or other medications.    Then we should recheck your thyroid lab and vitamin D  in 3 months.  Make a lab only appt. In early march for these 2 labs.     3. I sent to [facility] a new rx for daily vitamin D -5,000 unit pill -take with food at breakfast.           Follow-up: Return in about 3 months (around 3/19/2025), or @ 3 Pm via telephone, for Medication Therapy Management Visit, Lab Work.      SUBJECTIVE/OBJECTIVE:                          Dalton Kumar is a 64 year old female  called for a follow-up (10-9-24) visit. She was referred to me from Alyson De La Rosa PA-C.      Reason for visit:   A1c recheck.    Allergies/ADRs: Reviewed in chart  Past Medical History: Per patient ; she is blind --not checking blood sugars .   Tobacco: She reports that she quit smoking about 23 years ago. Her smoking use included cigarettes. She started  smoking about 30 years ago. She has a 3.5 pack-year smoking history. She has never used smokeless tobacco.  Alcohol: not currently using  Social: legally blind  Medication Adherence/Access: forgets her thyroid pill. Not at same time /day .     Diabetes -new diagnosis July 2024.  Metformin 500mg. Er tabs --2 tabs /day working well.   Patient is experiencing the following side effects: none   Current diabetes symptoms: none  Blood sugar monitoring: cannot check as she is blind.  Diet/Exercise:  Was eating a lot of freezy pops reason why her  --A1c went. Up. Has stopped them now.      Eye exam in the last 12 months? No (legally blind).  Foot exam: due    Lab Results   Component Value Date    A1C 6.3 12/02/2024    A1C 11.6 07/16/2024    A1C 6.5 02/21/2024    A1C 5.6 10/28/2022    A1C 6.0 05/29/2021         Hypothyroidism   Levothyroxine 175 mcg daily  Patient is having the following symptoms: hypothyroidism -  dry skin.       Last Comprehensive Metabolic Panel:  Sodium   Date Value Ref Range Status   03/23/2024 137 135 - 145 mmol/L Final     Comment:     Reference intervals for this test were updated on 09/26/2023 to more accurately reflect our healthy population. There may be differences in the flagging of prior results with similar values performed with this method. Interpretation of those prior results can be made in the context of the updated reference intervals.    05/07/2021 136 133 - 144 mmol/L Final     Potassium   Date Value Ref Range Status   03/23/2024 4.8 3.4 - 5.3 mmol/L Final   08/10/2022 4.4 3.4 - 5.3 mmol/L Final   05/07/2021 4.2 3.4 - 5.3 mmol/L Final     Chloride   Date Value Ref Range Status   03/23/2024 102 98 - 107 mmol/L Final   08/10/2022 106 94 - 109 mmol/L Final   05/07/2021 107 94 - 109 mmol/L Final     Carbon Dioxide   Date Value Ref Range Status   05/07/2021 17 (L) 20 - 32 mmol/L Final     Carbon Dioxide (CO2)   Date Value Ref Range Status   03/23/2024 22 22 - 29 mmol/L Final   08/10/2022 19  (L) 20 - 32 mmol/L Final     Anion Gap   Date Value Ref Range Status   03/23/2024 13 7 - 15 mmol/L Final   08/10/2022 13 3 - 14 mmol/L Final   05/07/2021 11 3 - 14 mmol/L Final     Glucose   Date Value Ref Range Status   03/23/2024 123 (H) 70 - 99 mg/dL Final   08/10/2022 133 (H) 70 - 99 mg/dL Final   05/07/2021 106 (H) 70 - 99 mg/dL Final     Urea Nitrogen   Date Value Ref Range Status   03/23/2024 25.0 (H) 8.0 - 23.0 mg/dL Final   08/10/2022 30 7 - 30 mg/dL Final   05/07/2021 27 7 - 30 mg/dL Final     Creatinine   Date Value Ref Range Status   03/23/2024 1.50 (H) 0.51 - 0.95 mg/dL Final   05/07/2021 0.98 0.52 - 1.04 mg/dL Final     GFR Estimate   Date Value Ref Range Status   03/23/2024 39 (L) >60 mL/min/1.73m2 Final   05/07/2021 63 >60 mL/min/[1.73_m2] Final     Comment:     Non  GFR Calc  Starting 12/18/2018, serum creatinine based estimated GFR (eGFR) will be   calculated using the Chronic Kidney Disease Epidemiology Collaboration   (CKD-EPI) equation.       Calcium   Date Value Ref Range Status   03/23/2024 10.5 (H) 8.8 - 10.2 mg/dL Final   05/07/2021 9.6 8.5 - 10.1 mg/dL Final     Bilirubin Total   Date Value Ref Range Status   07/16/2024 0.3 <=1.2 mg/dL Final   05/07/2021 0.2 0.2 - 1.3 mg/dL Final     Alkaline Phosphatase   Date Value Ref Range Status   07/16/2024 79 40 - 150 U/L Final   05/07/2021 65 40 - 150 U/L Final     ALT   Date Value Ref Range Status   07/16/2024 25 0 - 50 U/L Final   05/07/2021 38 0 - 50 U/L Final     AST   Date Value Ref Range Status   07/16/2024 29 0 - 45 U/L Final   05/07/2021 23 0 - 45 U/L Final     Vitamin D3: level was 18 on 7-16-24 --took 50 K pills x 12 weeks then stopped --no otc pill and no lab recheck yet.   Vitamin D Deficiency Screening Results:  Lab Results   Component Value Date    VITDT 18 (L) 07/16/2024    VITDT 14 (L) 08/10/2022     .       -----------------------------------------------------------------------------------------------------------------    I spent 20 minutes with this patient today. All changes were made via collaborative practice agreement with Yani Max MD. A copy of the visit note was provided to the patient's provider(s).    A summary of these recommendations was sent via [employer].    Marcia Felipe Rph.  Medication Therapy Management Provider  [phone]      Telemedicine Visit Details  Type of service:  Telephone visit  Start Time: 3:40 PM  End Time: 4:00  PM     Medication Therapy Recommendations  Hypothyroidism   1 Current Medication: levothyroxine (SYNTHROID/LEVOTHROID) 175 MCG tablet   Current Medication Sig: Take 1 tablet (175 mcg) by mouth daily   Rationale: Patient forgets to take - Adherence - Adherence   Recommendation: Provide Adherence Intervention   Status: Accepted per CPA   Identified Date: 12/3/2024 Completed Date: 12/3/2024         Vitamin D deficiency   1 Current Medication: Cholecalciferol (VITAMIN D) 125 MCG (5000 UT) capsule   Current Medication Sig: Take 1 capsule (5,000 Units) by mouth daily.   Rationale: Untreated condition - Needs additional medication therapy - Indication   Recommendation: Start Medication   Status: Accepted - no CPA Needed   Identified Date: 12/3/2024 Completed Date: 12/3/2024

## 2024-12-03 ENCOUNTER — TELEPHONE (OUTPATIENT)
Dept: FAMILY MEDICINE | Facility: CLINIC | Age: 64
End: 2024-12-03
Payer: MEDICARE

## 2024-12-03 ENCOUNTER — VIRTUAL VISIT (OUTPATIENT)
Dept: PHARMACY | Facility: CLINIC | Age: 64
End: 2024-12-03

## 2024-12-03 DIAGNOSIS — E03.9 HYPOTHYROIDISM, UNSPECIFIED TYPE: ICD-10-CM

## 2024-12-03 DIAGNOSIS — E11.9 TYPE 2 DIABETES MELLITUS WITHOUT COMPLICATION, WITHOUT LONG-TERM CURRENT USE OF INSULIN (H): Primary | ICD-10-CM

## 2024-12-03 DIAGNOSIS — E55.9 VITAMIN D DEFICIENCY: ICD-10-CM

## 2024-12-03 LAB
CHOLEST SERPL-MCNC: 203 MG/DL
CREAT UR-MCNC: 33.1 MG/DL
FASTING STATUS PATIENT QL REPORTED: YES
HDLC SERPL-MCNC: 10 MG/DL
LDLC SERPL CALC-MCNC: ABNORMAL MG/DL
LDLC SERPL DIRECT ASSAY-MCNC: 71 MG/DL
MICROALBUMIN UR-MCNC: <12 MG/L
MICROALBUMIN/CREAT UR: NORMAL MG/G{CREAT}
NONHDLC SERPL-MCNC: 193 MG/DL
T4 FREE SERPL-MCNC: 0.89 NG/DL (ref 0.9–1.7)
TRIGL SERPL-MCNC: 420 MG/DL
TSH SERPL DL<=0.005 MIU/L-ACNC: 41.8 UIU/ML (ref 0.3–4.2)

## 2024-12-03 PROCEDURE — 99207 PR NO CHARGE LOS: CPT | Mod: 93 | Performed by: PHARMACIST

## 2024-12-03 RX ORDER — CHOLECALCIFEROL (VITAMIN D3) 125 MCG
1 CAPSULE ORAL DAILY
Qty: 90 CAPSULE | Refills: 3 | Status: SHIPPED | OUTPATIENT
Start: 2024-12-03

## 2024-12-03 NOTE — PATIENT INSTRUCTIONS
"Recommendations from today's MTM visit:                                                         1. Congrats A1c now back to goal at 6.3%.  continue 2 tabs /day of Metformin.     2. Please remember to take your thyroid pill--levothyroxine 175mcg. Each morning on empty stomach with glass of water --try not to miss any doses , wait 30 minutes before taking any food or other medications.    Then we should recheck your thyroid lab and vitamin D  in 3 months.  Make a lab only appt. In early march for these 2 labs.     3. [facility] to [facility] new rx for daily vitamin D -5,000 unit pill -take with food at breakfast.           Follow-up: Return in about 3 months (around 3/19/2025), or @ 3 Pm via telephone, for Medication Therapy Management Visit, Lab Work.    It was great speaking with you today.  I value your experience and would be very thankful for your time in providing feedback in our clinic survey. In the next few days, you may receive an email or text message from [employer] with a link to a survey related to your  clinical pharmacist.\"     To schedule another MTM appointment, please call the clinic directly or you may call the MTM scheduling line at [phone] or toll-free at [phone].     My Clinical Pharmacist's contact information:                                                      Please feel free to contact me with any questions or concerns you have.      Marcia Felipe [facility]ph.  Medication Therapy Management Provider  [phone]    "

## 2024-12-03 NOTE — TELEPHONE ENCOUNTER
Call received from patient:  Returning call about lab results    Writer unable to locate telephone encounter nor lab result message from RN team indicating outreach to patient for lab result message.    Writer asked patient if voicemail was from Marcia Felipe [facility], as writer notes patient was scheduled for a telephone visit with him today at 1530.  Patient stated the voicemail probably was from him.    Writer placed patient on hold and contacted Marcia, who advised writer to transfer patient's call to his line: [phone].  Patient informed Marcia will be speaking with her and writer transferred patient's call, with patient's permission.    EZEQUIEL Watson, BSN, RN-BC  [facility]

## 2024-12-03 NOTE — Clinical Note
Deqa--fyi-- A1c great at 6.35 , tsh very high free t=4 just a tad low due to misssing doses of her levo=t drug --she will recommit to daily pill and I added back in daily vit-d pill --she will recheck both those labs in march 2025-then f/up with me on lab results.  Abdirahman

## 2024-12-04 NOTE — PROGRESS NOTES
[facility] - [facility]   Cardiology Clinic Note      HPI:   Ms. Dalton Kumar is a pleasant 64 year old female with medical history pertinent for dyslipidemia and hypertriglyceridemia, DM2, HTN, CKD III, bipolar I disorder, and Stargart disease. She presents to cardiology clinic for follow up.    Dalton was most recently seen in cardiology clinic in July 2024 by Dr. Vick. At that visit, fenofibrate was increased and Lovaza was ordered.     Today Dalton denies chest pain, palpitations, dizziness, syncope, or lower extremity edema. She has been eating more homemade food made by her daughter (lower oil, lower fat); cut out soda and carbs.   She not been taking Lovaza at night consistently because she takes it on an empty stomach and it makes her queasy.       PAST MEDICAL HISTORY:  Past Medical History:   Diagnosis Date    Bipolar 1 disorder (H)     Chronic kidney disease     Diabetes mellitus, type 2 (H) 07/09/2024    Hyperlipidemia     Hypertension     Hypothyroidism (acquired)     Lithium toxicity     Retinitis pigmentosa     Subdural hematoma (H)        FAMILY HISTORY:  Family History   Problem Relation Age of Onset    Diabetes Mother     Coronary Artery Disease Father     Coronary Artery Disease Early Onset Father     No Known Problems Brother     No Known Problems Brother     No Known Problems Brother     No Known Problems Brother     No Known Problems Brother     Cirrhosis Sister     Hyperlipidemia Daughter     No Known Problems Daughter     No Known Problems Daughter     No Known Problems Daughter     Emphysema Paternal Aunt     Glaucoma No family hx of     Macular Degeneration No family hx of        SOCIAL HISTORY:  Social History     Socioeconomic History    Marital status:    Tobacco Use    Smoking status: Former     Current packs/day: 0.00     Average packs/day: 0.5 packs/day for 7.0 years (3.5 ttl pk-yrs)     Types: Cigarettes     Start date: 12/4/1994     Quit date: 12/4/2001      Years since quittin.8    Smokeless tobacco: Never    Tobacco comments:     Quit    Vaping Use    Vaping status: Never Used   Substance and Sexual Activity    Alcohol use: Yes     Comment: 1 beer    Drug use: Never    Sexual activity: Yes     Partners: Male     Social Determinants of Health     Financial Resource Strain: Unknown (1/10/2024)    Financial Resource Strain     Within the past 12 months, have you or your family members you live with been unable to get utilities (heat, electricity) when it was really needed?: Patient declined   Food Insecurity: Low Risk  (1/10/2024)    Food Insecurity     Within the past 12 months, did you worry that your food would run out before you got money to buy more?: No     Within the past 12 months, did the food you bought just not last and you didn t have money to get more?: Patient declined   Transportation Needs: Unknown (1/10/2024)    Transportation Needs     Within the past 12 months, has lack of transportation kept you from medical appointments, getting your medicines, non-medical meetings or appointments, work, or from getting things that you need?: Patient declined   Interpersonal Safety: Low Risk  (2024)    Interpersonal Safety     Do you feel physically and emotionally safe where you currently live?: Yes     Within the past 12 months, have you been hit, slapped, kicked or otherwise physically hurt by someone?: No     Within the past 12 months, have you been humiliated or emotionally abused in other ways by your partner or ex-partner?: No   Housing Stability: Unknown (1/10/2024)    Housing Stability     Do you have housing? : Patient declined     Are you worried about losing your housing?: Patient declined       CURRENT MEDICATIONS:  Current Outpatient Medications   Medication Sig Dispense Refill    acetaminophen (TYLENOL) 500 MG tablet Take 1,000 mg by mouth      albuterol (PROAIR HFA/PROVENTIL HFA/VENTOLIN HFA) 108 (90 Base) MCG/ACT inhaler INHALE 2 PUFFS  BY MOUTH EVERY 6 HOURS AS NEEDED FOR SHORTNESS OF BREATH OR WHEEZING 54 g 11    atorvastatin (LIPITOR) 80 MG tablet TAKE 1 TABLET(80 MG) BY MOUTH DAILY 90 tablet 3    buPROPion (WELLBUTRIN XL) 150 MG 24 hr tablet Take 150 mg by mouth      Cholecalciferol (VITAMIN D) 125 MCG (5000 UT) capsule Take 1 capsule (5,000 Units) by mouth daily. 90 capsule 3    clobetasol propionate (TEMOVATE) 0.05 % external cream Apply a pea size amount for both hands twice a day for a week, then once daily for a week, and then stop. 15 g 0    desonide (DESOWEN) 0.05 % cream Apply topically 2 times daily      DiphenhydrAMINE HCl (BENADRYL PO) Take 25 mg by mouth daily as needed      divalproex sodium delayed-release (DEPAKOTE) 500 MG DR tablet Take 500 mg by mouth 2 times daily      fenofibrate (TRICOR) 145 MG tablet Take 1 tablet (145 mg) by mouth daily. 30 tablet 0    fluticasone (FLOVENT DISKUS) 250 MCG/ACT inhaler Inhale 1 puff into the lungs every 12 hours 60 each 4    fluticasone (FLOVENT HFA) 220 MCG/ACT inhaler Inhale 1 puff into the lungs 2 times daily 12 g 11    levothyroxine (SYNTHROID/LEVOTHROID) 175 MCG tablet Take 1 tablet (175 mcg) by mouth daily 90 tablet 1    lisinopril (ZESTRIL) 2.5 MG tablet Take 1 tablet (2.5 mg) by mouth daily 90 tablet 3    metFORMIN (GLUCOPHAGE XR) 500 MG 24 hr tablet Week 1 take 1 tablet by mouth with breakfast Week 2 take 1 tablet by mouth with breakfast and 1 tablet by mouth with dinner Week 3 Take 2 tablets with breakfast and 1 tablet with dinner Week 4 Take 2 tablets with breakfast and 2 tablets with dinner 360 tablet 1    omega-3 acid ethyl esters (LOVAZA) 1 g capsule Take 2 capsules (2 g) by mouth 2 times daily 30 capsule 3    paliperidone ER (INVEGA) 6 MG 24 hr tablet Take 6 mg by mouth      pimecrolimus (ELIDEL) 1 % cream       Skin Protectants, Misc. (EUCERIN) cream       topiramate (TOPAMAX) 100 MG tablet Take 100 mg by mouth       No current facility-administered medications for this  visit.       ROS:   Refer to HPI    EXAM:  /78 (BP Location: Right arm, Patient Position: Chair, Cuff Size: Adult Large)   Pulse 86   Wt 81.9 kg (180 lb 8 oz)   SpO2 96%   BMI 31.72 kg/m    GENERAL: Appears comfortable, in no acute distress.   CV: RRR, +S1S2, no murmur, rub, or gallop  RESPIRATORY: Respirations regular, even, and unlabored. Lungs CTA throughout.   GI: Soft and non distended with normoactive bowel sounds present in all quadrants. No tenderness, rebound, guarding.   EXTREMITIES: no peripheral edema. 2+ bilateral pedal pulses.   NEUROLOGIC: Alert and oriented x 3. No focal deficits.   MUSCULOSKELETAL: No joint swelling or tenderness.   SKIN: No jaundice. No rashes or lesions.     Labs, reviewed with patient in clinic today:  CBC RESULTS:  Lab Results   Component Value Date    WBC 8.7 03/23/2024    WBC 9.7 05/07/2021    RBC 3.76 (L) 03/23/2024    RBC 4.26 05/07/2021    HGB 12.2 03/23/2024    HGB 13.3 05/07/2021    HCT 37.2 03/23/2024    HCT 40.6 05/07/2021    MCV 99 03/23/2024    MCV 95 05/07/2021    MCH 32.4 03/23/2024    MCH 31.2 05/07/2021    MCHC 32.8 03/23/2024    MCHC 32.8 05/07/2021    RDW 14.8 03/23/2024    RDW 13.3 05/07/2021     03/23/2024     05/07/2021       CMP RESULTS:  Lab Results   Component Value Date     03/23/2024     05/07/2021    POTASSIUM 4.8 03/23/2024    POTASSIUM 4.4 08/10/2022    POTASSIUM 4.2 05/07/2021    CHLORIDE 102 03/23/2024    CHLORIDE 106 08/10/2022    CHLORIDE 107 05/07/2021    CO2 22 03/23/2024    CO2 19 (L) 08/10/2022    CO2 17 (L) 05/07/2021    ANIONGAP 13 03/23/2024    ANIONGAP 13 08/10/2022    ANIONGAP 11 05/07/2021     (H) 03/23/2024     (H) 08/10/2022     (H) 05/07/2021    BUN 25.0 (H) 03/23/2024    BUN 30 08/10/2022    BUN 27 05/07/2021    CR 1.50 (H) 03/23/2024    CR 0.98 05/07/2021    GFRESTIMATED 39 (L) 03/23/2024    GFRESTIMATED 63 05/07/2021    GFRESTBLACK 73 05/07/2021    RICK 10.5 (H) 03/23/2024     "RICK 9.6 05/07/2021    BILITOTAL 0.3 07/16/2024    BILITOTAL 0.2 05/07/2021    ALBUMIN 4.5 07/16/2024    ALBUMIN 4.3 08/10/2022    ALBUMIN 4.3 05/07/2021    ALKPHOS 79 07/16/2024    ALKPHOS 65 05/07/2021    ALT 25 07/16/2024    ALT 38 05/07/2021    AST 29 07/16/2024    AST 23 05/07/2021        INR RESULTS:  No results found for: \"INR\"    Lab Results   Component Value Date    MAG 2.3 01/06/2009     No results found for: \"NTBNPI\"  No results found for: \"NTBNP\"    LIPIDS:  Recent Labs   Lab Test 12/02/24  1359 07/16/24  1003   CHOL 203* 244*   HDL 10* 9*   LDL 71 76   TRIG 420* 823*       Assessment and Plan:   Ms. Kumar is a 64 year old female with a PMH of dyslipidemia and hypertriglyceridemia, DM2, HTN, CKD III, bipolar I disorder, and Stargart disease.    # Dyslipidemia  # Hypertriglyceridemia  Decrease in triglycerides from 823->420 after initiation of Lovaza (not taking BID consistently).   - Recommend eating small snack when taking evening Lovaza, or taking at dinnertime, to prevent queasiness  - repeat fasting lipid panel in 3 months     # CKD stage III  GFR 39-52    Follow up:  1 year  Chart review time today: 10 minutes  Visit time today: 13 minutes  Total time spent today: 23 minutes      Nella Santiago CNP  General Cardiology   12/09/24      "

## 2024-12-09 ENCOUNTER — OFFICE VISIT (OUTPATIENT)
Dept: CARDIOLOGY | Facility: CLINIC | Age: 64
End: 2024-12-09
Attending: CASE MANAGER/CARE COORDINATOR
Payer: MEDICARE

## 2024-12-09 VITALS
SYSTOLIC BLOOD PRESSURE: 122 MMHG | DIASTOLIC BLOOD PRESSURE: 78 MMHG | WEIGHT: 180.5 LBS | BODY MASS INDEX: 31.72 KG/M2 | OXYGEN SATURATION: 96 % | HEART RATE: 86 BPM

## 2024-12-09 DIAGNOSIS — H35.53 STARGARDT'S DISEASE: ICD-10-CM

## 2024-12-09 DIAGNOSIS — I10 HYPERTENSION, ESSENTIAL: ICD-10-CM

## 2024-12-09 DIAGNOSIS — N18.31 STAGE 3A CHRONIC KIDNEY DISEASE (H): ICD-10-CM

## 2024-12-09 DIAGNOSIS — E78.5 HYPERLIPIDEMIA WITH TARGET LDL LESS THAN 100: ICD-10-CM

## 2024-12-09 DIAGNOSIS — E78.2 MIXED HYPERLIPIDEMIA: ICD-10-CM

## 2024-12-09 DIAGNOSIS — L30.9 DERMATITIS: ICD-10-CM

## 2024-12-09 DIAGNOSIS — E78.1 HYPERTRIGLYCERIDEMIA: Primary | ICD-10-CM

## 2024-12-09 DIAGNOSIS — E11.9 TYPE 2 DIABETES MELLITUS WITHOUT COMPLICATION, WITHOUT LONG-TERM CURRENT USE OF INSULIN (H): ICD-10-CM

## 2024-12-09 PROCEDURE — G0463 HOSPITAL OUTPT CLINIC VISIT: HCPCS | Performed by: CASE MANAGER/CARE COORDINATOR

## 2024-12-09 RX ORDER — CLOBETASOL PROPIONATE 0.5 MG/G
CREAM TOPICAL
Qty: 15 G | Refills: 0 | Status: SHIPPED | OUTPATIENT
Start: 2024-12-09

## 2024-12-09 ASSESSMENT — PAIN SCALES - GENERAL: PAINLEVEL_OUTOF10: NO PAIN (0)

## 2024-12-11 ENCOUNTER — PATIENT OUTREACH (OUTPATIENT)
Dept: CARE COORDINATION | Facility: CLINIC | Age: 64
End: 2024-12-11
Payer: MEDICARE

## 2024-12-25 ENCOUNTER — PATIENT OUTREACH (OUTPATIENT)
Dept: CARE COORDINATION | Facility: CLINIC | Age: 64
End: 2024-12-25
Payer: MEDICARE

## 2024-12-31 ENCOUNTER — PATIENT OUTREACH (OUTPATIENT)
Dept: CARE COORDINATION | Facility: CLINIC | Age: 64
End: 2024-12-31
Payer: MEDICARE

## 2025-01-03 DIAGNOSIS — E78.5 HYPERLIPIDEMIA WITH TARGET LDL LESS THAN 100: ICD-10-CM

## 2025-01-03 DIAGNOSIS — E78.1 HYPERTRIGLYCERIDEMIA: ICD-10-CM

## 2025-01-08 RX ORDER — FENOFIBRATE 145 MG/1
145 TABLET, COATED ORAL DAILY
Qty: 90 TABLET | Refills: 3 | Status: SHIPPED | OUTPATIENT
Start: 2025-01-08

## 2025-01-08 NOTE — TELEPHONE ENCOUNTER
fenofibrate (TRICOR) 145 MG tablet 30 tablet 0 10/31/2024     Last Office Visit: 12/9/24  Future Office visit:  none       Antihyperlipidemic agents Passed          Nuha Agustin RN  [facility]/Red Flag Triage & Med Refill Team

## 2025-01-16 ENCOUNTER — PATIENT OUTREACH (OUTPATIENT)
Dept: GASTROENTEROLOGY | Facility: CLINIC | Age: 65
End: 2025-01-16
Payer: MEDICARE

## 2025-01-16 DIAGNOSIS — Z12.11 SPECIAL SCREENING FOR MALIGNANT NEOPLASMS, COLON: Primary | ICD-10-CM

## 2025-01-16 NOTE — PROGRESS NOTES
"CRC Screening Colonoscopy Referral Review    Patient meets the inclusion criteria for screening colonoscopy standing order.    Ordering/Referring Provider:  Yani Max      BMI: Estimated body mass index is 31.72 kg/m  as calculated from the following:    Height as of 2/21/24: 1.607 m (5' 3.25\").    Weight as of 12/9/24: 81.9 kg (180 lb 8 oz).     Sedation:  Does patient have any of the following conditions affecting sedation?  No medical conditions affecting sedation.    Previous Scopes:  Any previous recommendations or follow up needs based on previous scope?  na / No recommendations.    Medical Concerns to Postpone Order:  Does patient have any of the following medical concerns that should postpone/delay colonoscopy referral?  No medical conditions affecting colonoscopy referral.    Final Referral Details:  Based on patient's medical history patient is appropriate for referral order with moderate sedation. If patient's BMI > 50 do not schedule in ASC.  "

## 2025-01-28 ENCOUNTER — PATIENT OUTREACH (OUTPATIENT)
Dept: CARE COORDINATION | Facility: CLINIC | Age: 65
End: 2025-01-28
Payer: MEDICARE

## 2025-02-17 ENCOUNTER — OFFICE VISIT (OUTPATIENT)
Dept: FAMILY MEDICINE | Facility: CLINIC | Age: 65
End: 2025-02-17
Payer: MEDICARE

## 2025-02-17 VITALS
OXYGEN SATURATION: 97 % | HEART RATE: 86 BPM | DIASTOLIC BLOOD PRESSURE: 82 MMHG | SYSTOLIC BLOOD PRESSURE: 120 MMHG | TEMPERATURE: 97 F | RESPIRATION RATE: 16 BRPM

## 2025-02-17 DIAGNOSIS — L84 CALLUS OF FOOT: Primary | ICD-10-CM

## 2025-02-17 DIAGNOSIS — E11.65 TYPE 2 DIABETES MELLITUS WITH HYPERGLYCEMIA, WITHOUT LONG-TERM CURRENT USE OF INSULIN (H): ICD-10-CM

## 2025-02-17 PROCEDURE — 99213 OFFICE O/P EST LOW 20 MIN: CPT | Performed by: PHYSICIAN ASSISTANT

## 2025-02-17 PROCEDURE — G2211 COMPLEX E/M VISIT ADD ON: HCPCS | Performed by: PHYSICIAN ASSISTANT

## 2025-02-17 RX ORDER — TRIAMCINOLONE ACETONIDE 1 MG/G
CREAM TOPICAL 2 TIMES DAILY
Qty: 30 G | Refills: 1 | Status: SHIPPED | OUTPATIENT
Start: 2025-02-17

## 2025-02-17 RX ORDER — MUPIROCIN 20 MG/G
OINTMENT TOPICAL 3 TIMES DAILY
Qty: 15 G | Refills: 1 | Status: SHIPPED | OUTPATIENT
Start: 2025-02-17

## 2025-02-17 ASSESSMENT — ASTHMA QUESTIONNAIRES
QUESTION_3 LAST FOUR WEEKS HOW OFTEN DID YOUR ASTHMA SYMPTOMS (WHEEZING, COUGHING, SHORTNESS OF BREATH, CHEST TIGHTNESS OR PAIN) WAKE YOU UP AT NIGHT OR EARLIER THAN USUAL IN THE MORNING: NOT AT ALL
QUESTION_5 LAST FOUR WEEKS HOW WOULD YOU RATE YOUR ASTHMA CONTROL: COMPLETELY CONTROLLED
ACT_TOTALSCORE: 25
QUESTION_4 LAST FOUR WEEKS HOW OFTEN HAVE YOU USED YOUR RESCUE INHALER OR NEBULIZER MEDICATION (SUCH AS ALBUTEROL): NOT AT ALL
QUESTION_1 LAST FOUR WEEKS HOW MUCH OF THE TIME DID YOUR ASTHMA KEEP YOU FROM GETTING AS MUCH DONE AT WORK, SCHOOL OR AT HOME: NONE OF THE TIME
QUESTION_2 LAST FOUR WEEKS HOW OFTEN HAVE YOU HAD SHORTNESS OF BREATH: NOT AT ALL
ACT_TOTALSCORE: 25

## 2025-02-17 ASSESSMENT — PAIN SCALES - GENERAL: PAINLEVEL_OUTOF10: MODERATE PAIN (6)

## 2025-02-17 NOTE — PROGRESS NOTES
Assessment & Plan     (L84) Callus of foot  (primary encounter diagnosis)  Comment:   Plan: Adult Dermatology  Referral, mupirocin        (BACTROBAN) 2 % external ointment,         triamcinolone (KENALOG) 0.1 % external cream          Stressed the importance of footcare with type 2 diabetes, patient advised to continue to soak, use thick emollients and wear socks to bed.  To help with thickening and irritation in feet patient given topical steroid to use as well as referred to dermatology.  Based on mobility limitations patient also given a topical antibiotic to use if having any worsening weeping or drainage from the feet.  Follow-up with dermatology but sooner in clinic if any worsening symptoms.    (E11.65) Type 2 diabetes mellitus with hyperglycemia, without long-term current use of insulin (H)  Comment:   Plan: This comorbid condition increases medical complexity and medical decision making.      Karen Hoffman is a 64 year old, presenting for the following health issues:  Derm Problem (Dry hand and feet. More concerned on feet dryness.)      2/17/2025     2:27 PM   Additional Questions   Roomed by Jackie VALLEJO   Accompanied by ILS worker         2/17/2025   Declines Weight   Did patient decline having their weight taken? Yes     History of Present Illness       Reason for visit:  Injury  Symptom onset:  More than a month  Symptoms include:  Cracked and peeling feet  Symptom intensity:  Severe  Symptom progression:  Staying the same  Had these symptoms before:  No  What makes it worse:  No  What makes it better:  No   She is taking medications regularly.     Patient with a history of type 2 diabetes, presents today for evaluation of dried cracking skin on her hands and feet.  Patient has been using topical emollients on hands and feet without any significant improvement of symptoms, no drainage noted.  She has tried a pumice stone but noting that it made more open wounds on feet and made them  burn more.    No neuropathy reported by the patient, denies any fevers or chills                Objective    /82   Pulse 86   Temp 97  F (36.1  C) (Temporal)   Resp 16   SpO2 97%   There is no height or weight on file to calculate BMI.  Physical Exam   GENERAL: healthy, alert and no distress  EYES: Eyes grossly normal to inspection, EOM intact and conjunctivae normal  RESP: breathing comfortably on room air  PSYCH: mentation appears normal, affect normal/bright  SKIN: Bilateral hands with erythematous scaling cracked skin on palmar side.  Bilateral feet with thickened callus with flaking scale overlying; monofilament intact BL            Signed Electronically by: Regino Costello PA-C

## 2025-02-23 ENCOUNTER — HEALTH MAINTENANCE LETTER (OUTPATIENT)
Age: 65
End: 2025-02-23

## 2025-03-03 ENCOUNTER — TRANSFERRED RECORDS (OUTPATIENT)
Dept: HEALTH INFORMATION MANAGEMENT | Facility: CLINIC | Age: 65
End: 2025-03-03
Payer: MEDICARE

## 2025-03-18 DIAGNOSIS — I10 PRIMARY HYPERTENSION: ICD-10-CM

## 2025-03-18 DIAGNOSIS — E78.1 HYPERTRIGLYCERIDEMIA: ICD-10-CM

## 2025-03-18 RX ORDER — LISINOPRIL 2.5 MG/1
2.5 TABLET ORAL DAILY
Qty: 90 TABLET | Refills: 0 | Status: SHIPPED | OUTPATIENT
Start: 2025-03-18

## 2025-03-18 RX ORDER — ATORVASTATIN CALCIUM 80 MG/1
TABLET, FILM COATED ORAL
Qty: 90 TABLET | Refills: 1 | Status: SHIPPED | OUTPATIENT
Start: 2025-03-18

## 2025-03-18 NOTE — TELEPHONE ENCOUNTER
Pending Prescriptions:                       Disp   Refills    lisinopril (ZESTRIL) 2.5 MG tablet        90 tab*3            Sig: Take 1 tablet (2.5 mg) by mouth daily.    atorvastatin (LIPITOR) 80 MG tablet       90 tab*3            Sig: TAKE 1 TABLET(80 MG) BY MOUTH DAILY

## 2025-03-23 ENCOUNTER — HEALTH MAINTENANCE LETTER (OUTPATIENT)
Age: 65
End: 2025-03-23

## 2025-04-07 ENCOUNTER — TRANSFERRED RECORDS (OUTPATIENT)
Dept: HEALTH INFORMATION MANAGEMENT | Facility: CLINIC | Age: 65
End: 2025-04-07
Payer: MEDICARE

## 2025-04-13 ENCOUNTER — HEALTH MAINTENANCE LETTER (OUTPATIENT)
Age: 65
End: 2025-04-13

## 2025-04-29 DIAGNOSIS — E03.9 HYPOTHYROIDISM, UNSPECIFIED TYPE: ICD-10-CM

## 2025-04-30 RX ORDER — LEVOTHYROXINE SODIUM 175 UG/1
175 TABLET ORAL DAILY
Qty: 90 TABLET | Refills: 0 | Status: SHIPPED | OUTPATIENT
Start: 2025-04-30

## 2025-06-28 ENCOUNTER — MEDICAL CORRESPONDENCE (OUTPATIENT)
Dept: HEALTH INFORMATION MANAGEMENT | Facility: CLINIC | Age: 65
End: 2025-06-28

## 2025-07-01 ENCOUNTER — TELEPHONE (OUTPATIENT)
Dept: FAMILY MEDICINE | Facility: CLINIC | Age: 65
End: 2025-07-01
Payer: MEDICARE

## 2025-07-01 DIAGNOSIS — I10 PRIMARY HYPERTENSION: ICD-10-CM

## 2025-07-01 RX ORDER — LISINOPRIL 2.5 MG/1
2.5 TABLET ORAL DAILY
Qty: 90 TABLET | Refills: 0 | Status: SHIPPED | OUTPATIENT
Start: 2025-07-01

## 2025-07-01 NOTE — TELEPHONE ENCOUNTER
Pending Prescriptions:                       Disp   Refills    lisinopril (ZESTRIL) 2.5 MG tablet        90 tab*0            Sig: Take 1 tablet (2.5 mg) by mouth daily.

## 2025-07-01 NOTE — TELEPHONE ENCOUNTER
Home Care is calling regarding an established patient with [facility].  Requesting orders from: Regino Costello PA-C  RN APPROVED: RN able to provide verbal orders. Home Care will send orders for signature.  RN will close encounter.  Is this a request for a temporary pause in the home care episode?  No    Orders Requested    Skilled Nursing  Request for initial certification (first set of orders)   Frequency: 1x/wk for 3 wks  RN gave verbal order: Yes      Physical Therapy  Request for initial certification (first set of orders)   Frequency: 1x/wk for 5 wks   RN gave verbal order: Yes      Occupational Therapy  Patient declined SAMUEL Martinez from [facility]  Phone number Home Care can be reached at: [phone]  Okay to leave a detailed message?: Yes      Alexander LAGUERRE RN  [facility]

## 2025-07-03 ENCOUNTER — TELEPHONE (OUTPATIENT)
Dept: FAMILY MEDICINE | Facility: CLINIC | Age: 65
End: 2025-07-03
Payer: MEDICAID

## 2025-07-03 DIAGNOSIS — N94.89 VAGINAL BURNING: ICD-10-CM

## 2025-07-03 DIAGNOSIS — R30.0 BURNING WITH URINATION: ICD-10-CM

## 2025-07-03 DIAGNOSIS — N89.8 VAGINAL ITCHING: Primary | ICD-10-CM

## 2025-07-03 NOTE — TELEPHONE ENCOUNTER
Writer called and left message on patient's voicemail to call back and speak with a triage nurse.    Writer called and left message on [facility] RN voice mail regarding orders placed for UA/UC.     ALIN MunozN RN  [facility]

## 2025-07-03 NOTE — TELEPHONE ENCOUNTER
Writer called [facility] with [facility]. Left message that UA/UC have been ordered for patient.   If you have any questions give us a call at [phone].    DANIEL Munoz RN  [facility]

## 2025-07-03 NOTE — TELEPHONE ENCOUNTER
Covering clinician - home care calling to report pt reported vaginal itching and burning and burning with urination. Patient tried monistat home tx with some improvement, but still reporting sx. UA/UC and wet prep pended if appropriate. (RN cannot advise e-visit as patient has not been active on MyChart since 2017.)    RN team - Home care RN requests we call him back with update, as they will likely need to facilitate any testing at home given patient's visual impairment.    EZEQUIEL West, ALINN, PHN, AMB-BC (she/her)  [facility] RN

## 2025-07-04 ENCOUNTER — LAB REQUISITION (OUTPATIENT)
Dept: LAB | Facility: HOSPITAL | Age: 65
End: 2025-07-04
Payer: MEDICARE

## 2025-07-04 DIAGNOSIS — R30.0 DYSURIA: ICD-10-CM

## 2025-07-04 LAB
ALBUMIN UR-MCNC: NEGATIVE MG/DL
APPEARANCE UR: CLEAR
BACTERIA #/AREA URNS HPF: ABNORMAL /HPF
BILIRUB UR QL STRIP: NEGATIVE
COLOR UR AUTO: COLORLESS
GLUCOSE UR STRIP-MCNC: NEGATIVE MG/DL
HGB UR QL STRIP: NEGATIVE
KETONES UR STRIP-MCNC: NEGATIVE MG/DL
LEUKOCYTE ESTERASE UR QL STRIP: NEGATIVE
NITRATE UR QL: NEGATIVE
PH UR STRIP: 7 [PH] (ref 5–7)
RBC URINE: <1 /HPF
SP GR UR STRIP: 1 (ref 1–1.03)
SQUAMOUS EPITHELIAL: <1 /HPF
UROBILINOGEN UR STRIP-MCNC: NORMAL MG/DL
WBC URINE: 1 /HPF

## 2025-07-04 PROCEDURE — 81001 URINALYSIS AUTO W/SCOPE: CPT | Mod: ORL | Performed by: PHYSICIAN ASSISTANT

## 2025-07-06 ENCOUNTER — HEALTH MAINTENANCE LETTER (OUTPATIENT)
Age: 65
End: 2025-07-06

## 2025-07-06 ENCOUNTER — RESULTS FOLLOW-UP (OUTPATIENT)
Dept: FAMILY MEDICINE | Facility: CLINIC | Age: 65
End: 2025-07-06
Payer: MEDICAID

## 2025-07-07 ENCOUNTER — TELEPHONE (OUTPATIENT)
Dept: FAMILY MEDICINE | Facility: CLINIC | Age: 65
End: 2025-07-07

## 2025-07-07 ENCOUNTER — VIRTUAL VISIT (OUTPATIENT)
Dept: FAMILY MEDICINE | Facility: CLINIC | Age: 65
End: 2025-07-07
Payer: MEDICARE

## 2025-07-07 DIAGNOSIS — E11.65 TYPE 2 DIABETES MELLITUS WITH HYPERGLYCEMIA, WITHOUT LONG-TERM CURRENT USE OF INSULIN (H): ICD-10-CM

## 2025-07-07 DIAGNOSIS — B37.31 CANDIDIASIS OF VAGINA: ICD-10-CM

## 2025-07-07 DIAGNOSIS — I10 PRIMARY HYPERTENSION: ICD-10-CM

## 2025-07-07 DIAGNOSIS — Z09 HOSPITAL DISCHARGE FOLLOW-UP: Primary | ICD-10-CM

## 2025-07-07 DIAGNOSIS — K35.32 ACUTE APPENDICITIS WITH PERFORATION, LOCALIZED PERITONITIS, AND GANGRENE, WITHOUT ABSCESS: ICD-10-CM

## 2025-07-07 DIAGNOSIS — E78.1 HYPERTRIGLYCERIDEMIA: ICD-10-CM

## 2025-07-07 PROCEDURE — 1111F DSCHRG MED/CURRENT MED MERGE: CPT | Performed by: INTERNAL MEDICINE

## 2025-07-07 PROCEDURE — 98006 SYNCH AUDIO-VIDEO EST MOD 30: CPT | Performed by: INTERNAL MEDICINE

## 2025-07-07 RX ORDER — MICONAZOLE NITRATE 2 G/100G
CREAM TOPICAL 2 TIMES DAILY
Qty: 198 G | Refills: 2 | Status: SHIPPED | OUTPATIENT
Start: 2025-07-07

## 2025-07-07 RX ORDER — FLUCONAZOLE 150 MG/1
150 TABLET ORAL ONCE
Qty: 1 TABLET | Refills: 1 | Status: SHIPPED | OUTPATIENT
Start: 2025-07-07 | End: 2025-07-07

## 2025-07-07 NOTE — TELEPHONE ENCOUNTER
"  General Call    Contacts       Contact Date/Time Type Contact Phone/Fax    07/07/2025 12:03 PM CDT Phone (Incoming) Dalton Kumar (Self) [phone] (M)          Reason for Call:  Pt calling back - said triage nurse called her but there are no notes in pt chart    What are your questions or concerns:  Pt states \"its probably about my chest\" but overall is very confused on who called. Notes from today indicate that Home Nurse was given a message from [facility] but pt has not heard from Home Nurse since this morning.     Pt also has a follow up video visit scheduled for today but does not know the phone number to send the link to. The phone number in her chart is a flip phone. She does have an iPhone but doesn't know the number & she is unable to log onto [employer] for the video link. States she will have her daughter call with the phone number to send for today's visit.     Date of last appointment with provider: -    Could we send this information to you in [employer] or would you prefer to receive a phone call?:   Patient would prefer a phone call   Okay to leave a detailed message?: Yes at Cell number on file:    Telephone Information:   Mobile [phone]       "

## 2025-07-07 NOTE — TELEPHONE ENCOUNTER
Home care nurse Natalie called inquiring about lab results. Results was relayed to home care nurse. He will call patient and notify patient of provider message/results.         Naveed Trevino MSN, RN   [facility]

## 2025-07-07 NOTE — PROGRESS NOTES
Dalton is a 64 year old who is being evaluated via a billable video visit.    How would you like to obtain your AVS? MyChart  If the video visit is dropped, the invitation should be resent by: Text to cell phone: [phone]  Will anyone else be joining your video visit? No      Assessment & Plan     Candidiasis of vagina  - symptoms not well controlled  - fluconazole (DIFLUCAN) 150 MG tablet  Dispense: 1 tablet; Refill: 1  - miconazole (MICATIN) 2 % external cream  Dispense: 198 g; Refill: 2    Hospital discharge follow-up  Acute appendicitis with perforation, localized peritonitis, and gangrene, without abscess  - stable  - follow up with outpatient general surgery clinic     Type 2 diabetes mellitus with hyperglycemia, without long-term current use of insulin (H)  - stable, continue current therapy  - Adult Eye  Referral    Hypertriglyceridemia  - stable, continue current therapy    Primary hypertension  - stable, continue current therapy          MED REC REQUIRED  Post Medication Reconciliation Status:  Discharge medications reconciled and changed, see notes/orders    Follow-up in 3 months      Subjective   Dalton is a 64 year old, presenting for the following health issues:  Hospital F/U        [facility]        Hospital Follow-up Visit:    Hospital/Nursing Home/IP Rehab Facility: [facility]    Date of Admission: 06/15/2025  Date of Discharge: 06/24/2025  Reason(s) for Admission:           Acute appendicitis, unspecified acute appendicitis type (Primary Dx);   Appendicitis with perforation;   Bipolar affective disorder, depressed, in full remission (HRC);   Stage 3 chronic kidney disease, unspecified whether stage 3a or 3b CKD ([facility]C);   Elevated LFTs     Do you have any other stressors you would like to discuss with your provider? No    Problems taking medications regularly:  None  Medication changes since discharge: None  Problems adhering to non-medication therapy:  None    Summary of  hospitalization:  CareEverywhere information obtained and reviewed  Diagnostic Tests/Treatments reviewed.  Follow up needed: general surgery  Other Healthcare Providers Involved in Patient s Care:         Care Coordination  Update since discharge: improved.     Plan of care communicated with patient and family       Current Outpatient Medications   Medication Sig Dispense Refill    miconazole (MICATIN) 2 % external cream Apply topically 2 times daily. 198 g 2    acetaminophen (TYLENOL) 500 MG tablet Take 1,000 mg by mouth      albuterol (PROAIR HFA/PROVENTIL HFA/VENTOLIN HFA) 108 (90 Base) MCG/ACT inhaler INHALE 2 PUFFS BY MOUTH EVERY 6 HOURS AS NEEDED FOR SHORTNESS OF BREATH OR WHEEZING 54 g 11    atorvastatin (LIPITOR) 80 MG tablet TAKE 1 TABLET(80 MG) BY MOUTH DAILY 90 tablet 1    buPROPion (WELLBUTRIN XL) 150 MG 24 hr tablet Take 150 mg by mouth      Cholecalciferol (VITAMIN D) 125 MCG (5000 UT) capsule Take 1 capsule (5,000 Units) by mouth daily. 90 capsule 3    clobetasol propionate (TEMOVATE) 0.05 % external cream Apply a pea size amount for both hands twice a day for a week, then once daily for a week, and then stop. 15 g 0    desonide (DESOWEN) 0.05 % cream Apply topically 2 times daily      DiphenhydrAMINE HCl (BENADRYL PO) Take 25 mg by mouth daily as needed      divalproex sodium delayed-release (DEPAKOTE) 500 MG DR tablet Take 500 mg by mouth 2 times daily      fenofibrate (TRICOR) 145 MG tablet Take 1 tablet (145 mg) by mouth daily. 90 tablet 3    fluticasone (FLOVENT DISKUS) 250 MCG/ACT inhaler Inhale 1 puff into the lungs every 12 hours 60 each 4    fluticasone (FLOVENT HFA) 220 MCG/ACT inhaler Inhale 1 puff into the lungs 2 times daily 12 g 11    levothyroxine (SYNTHROID/LEVOTHROID) 175 MCG tablet Take 1 tablet (175 mcg) by mouth daily. 90 tablet 0    lisinopril (ZESTRIL) 2.5 MG tablet Take 1 tablet (2.5 mg) by mouth daily. 90 tablet 0    metFORMIN (GLUCOPHAGE XR) 500 MG 24 hr tablet Week 1 take 1  tablet by mouth with breakfast Week 2 take 1 tablet by mouth with breakfast and 1 tablet by mouth with dinner Week 3 Take 2 tablets with breakfast and 1 tablet with dinner Week 4 Take 2 tablets with breakfast and 2 tablets with dinner 360 tablet 1    mupirocin (BACTROBAN) 2 % external ointment Apply topically 3 times daily. As needed for open foot wounds 15 g 1    omega-3 acid ethyl esters (LOVAZA) 1 g capsule Take 2 capsules (2 g) by mouth 2 times daily 30 capsule 3    paliperidone ER (INVEGA) 6 MG 24 hr tablet Take 6 mg by mouth      pimecrolimus (ELIDEL) 1 % cream       Skin Protectants, Misc. (EUCERIN) cream       topiramate (TOPAMAX) 100 MG tablet Take 100 mg by mouth      triamcinolone (KENALOG) 0.1 % external cream Apply topically 2 times daily. To feet and hands bilaterally 30 g 1     No current facility-administered medications for this visit.     Past Medical History:   Diagnosis Date    Bipolar 1 disorder (H)     Chronic kidney disease     Diabetes mellitus, type 2 (H) 2024    Hyperlipidemia     Hypertension     Hypothyroidism (acquired)     Lithium toxicity     Retinitis pigmentosa     Subdural hematoma (H)      Social History     Socioeconomic History    Marital status:      Spouse name: Not on file    Number of children: Not on file    Years of education: Not on file    Highest education level: Not on file   Occupational History    Not on file   Tobacco Use    Smoking status: Former     Current packs/day: 0.00     Average packs/day: 0.5 packs/day for 7.0 years (3.5 ttl pk-yrs)     Types: Cigarettes     Start date: 1994     Quit date: 2001     Years since quittin.6    Smokeless tobacco: Never    Tobacco comments:     Quit    Vaping Use    Vaping status: Never Used   Substance and Sexual Activity    Alcohol use: Yes     Comment: 1 beer    Drug use: Never    Sexual activity: Yes     Partners: Male   Other Topics Concern    Parent/sibling w/ CABG, MI or angioplasty before  65F 55M? Not Asked   Social History Narrative    Not on file     Social Drivers of Health     Financial Resource Strain: Unknown (1/10/2024)    Financial Resource Strain     Within the past 12 months, have you or your family members you live with been unable to get utilities (heat, electricity) when it was really needed?: Patient declined   Food Insecurity: Patient Unable To Answer (6/15/2025)    Received from [facility]    Hunger Vital Sign     Worried About Running Out of Food in the Last Year: Patient unable to answer     Ran Out of Food in the Last Year: Patient unable to answer   Transportation Needs: Patient Unable To Answer (6/15/2025)    Received from [facility]    PRAPARE - Transportation     Lack of Transportation (Medical): Patient unable to answer     Lack of Transportation (Non-Medical): Patient unable to answer   Physical Activity: Not on file   Stress: Not on file   Social Connections: Not on file   Interpersonal Safety: Patient Unable To Answer (6/15/2025)    Received from [facility]    Humiliation, Afraid, Rape, and Kick questionnaire     Fear of Current or Ex-Partner: Patient unable to answer     Emotionally Abused: Patient unable to answer     Physically Abused: Patient unable to answer     Sexually Abused: Patient unable to answer   Housing Stability: Patient Unable To Answer (6/15/2025)    Received from [facility]    Housing Stability Vital Sign     Unable to Pay for Housing in the Last Year: Patient unable to answer     Number of Times Moved in the Last Year: Not on file     Homeless in the Last Year: Patient unable to answer     Family History   Problem Relation Age of Onset    Diabetes Mother     Coronary Artery Disease Father     Coronary Artery Disease Early Onset Father     No Known Problems Brother     No Known Problems Brother     No Known Problems Brother     No Known Problems Brother     No Known Problems Brother     Cirrhosis Sister     Hyperlipidemia Daughter     No  Known Problems Daughter     No Known Problems Daughter     No Known Problems Daughter     Emphysema Paternal Aunt     Glaucoma No family hx of     Macular Degeneration No family hx of          Review of Systems  Constitutional, HEENT, cardiovascular, pulmonary, GI, , musculoskeletal, neuro, skin, endocrine and psych systems are negative, except as otherwise noted.      Objective           Vitals:  No vitals were obtained today due to virtual visit.    Physical Exam   GENERAL: alert and no distress  EYES: Eyes grossly normal to inspection.  No discharge or erythema, or obvious scleral/conjunctival abnormalities.  RESP: No audible wheeze, cough, or visible cyanosis.    SKIN: Visible skin clear. No significant rash, abnormal pigmentation or lesions.  NEURO: Cranial nerves grossly intact.  Mentation and speech appropriate for age.  PSYCH: Appropriate affect, tone, and pace of words    Labs reviewed in Epic        Video-Visit Details    Type of service:  Video Visit   Video Visit Start Time: 12:50 PM  Video Visit End Time: 1:24 PM  Originating Location (pt. Location): Home    Distant Location (provider location):  On-site  Platform used for Video Visit: Doxneida  Signed Electronically by: Becky Oseguera MD

## 2025-07-08 ENCOUNTER — TELEPHONE (OUTPATIENT)
Dept: FAMILY MEDICINE | Facility: CLINIC | Age: 65
End: 2025-07-08
Payer: MEDICAID

## 2025-07-08 NOTE — TELEPHONE ENCOUNTER
[facility] calling again to inform pcp clinic that they did not have supplies to complete wet prep. Per [facility], pt was advised to come into clinic.

## 2025-07-08 NOTE — TELEPHONE ENCOUNTER
Forms/Letter Request    Type of form/letter: Home Health Certification      Do we have the form/letter: Yes: CT 2    Who is the form from? Home care    Where did/will the form come from? form was faxed in    When is form/letter needed by: asap    How would you like the form/letter returned: Fax : [phone]

## 2025-07-09 ENCOUNTER — PATIENT OUTREACH (OUTPATIENT)
Dept: CARE COORDINATION | Facility: CLINIC | Age: 65
End: 2025-07-09
Payer: MEDICAID

## 2025-07-29 ENCOUNTER — PATIENT OUTREACH (OUTPATIENT)
Dept: CARE COORDINATION | Facility: CLINIC | Age: 65
End: 2025-07-29
Payer: MEDICARE